# Patient Record
Sex: FEMALE | Race: WHITE | NOT HISPANIC OR LATINO | URBAN - METROPOLITAN AREA
[De-identification: names, ages, dates, MRNs, and addresses within clinical notes are randomized per-mention and may not be internally consistent; named-entity substitution may affect disease eponyms.]

---

## 2024-09-29 ENCOUNTER — HOSPITAL ENCOUNTER (INPATIENT)
Facility: HOSPITAL | Age: 74
End: 2024-09-29
Attending: STUDENT IN AN ORGANIZED HEALTH CARE EDUCATION/TRAINING PROGRAM | Admitting: INTERNAL MEDICINE
Payer: MEDICARE

## 2024-09-29 ENCOUNTER — DOCUMENTATION (OUTPATIENT)
Dept: INTERNAL MEDICINE | Facility: HOSPITAL | Age: 74
End: 2024-09-29
Payer: MEDICARE

## 2024-09-29 DIAGNOSIS — M79.89 SWELLING OF CALF: ICD-10-CM

## 2024-09-29 DIAGNOSIS — R01.1 SYSTOLIC MURMUR: Primary | ICD-10-CM

## 2024-09-29 DIAGNOSIS — K50.919 CROHN'S DISEASE WITH COMPLICATION, UNSPECIFIED GASTROINTESTINAL TRACT LOCATION: ICD-10-CM

## 2024-09-29 DIAGNOSIS — S72.002A LEFT DISPLACED FEMORAL NECK FRACTURE: ICD-10-CM

## 2024-09-29 DIAGNOSIS — Z90.710 HISTORY OF HYSTERECTOMY: ICD-10-CM

## 2024-09-29 DIAGNOSIS — C25.9 PANCREATIC CANCER (MULTI): ICD-10-CM

## 2024-09-29 PROCEDURE — 1170000001 HC PRIVATE ONCOLOGY ROOM DAILY

## 2024-09-29 PROCEDURE — 99223 1ST HOSP IP/OBS HIGH 75: CPT

## 2024-09-29 RX ORDER — POLYETHYLENE GLYCOL 3350 17 G/17G
17 POWDER, FOR SOLUTION ORAL DAILY
Status: DISPENSED | OUTPATIENT
Start: 2024-09-30

## 2024-09-29 SDOH — ECONOMIC STABILITY: FOOD INSECURITY: WITHIN THE PAST 12 MONTHS, THE FOOD YOU BOUGHT JUST DIDN'T LAST AND YOU DIDN'T HAVE MONEY TO GET MORE.: NEVER TRUE

## 2024-09-29 SDOH — HEALTH STABILITY: PHYSICAL HEALTH: ON AVERAGE, HOW MANY MINUTES DO YOU ENGAGE IN EXERCISE AT THIS LEVEL?: PATIENT DECLINED

## 2024-09-29 SDOH — SOCIAL STABILITY: SOCIAL INSECURITY: WITHIN THE LAST YEAR, HAVE YOU BEEN HUMILIATED OR EMOTIONALLY ABUSED IN OTHER WAYS BY YOUR PARTNER OR EX-PARTNER?: NO

## 2024-09-29 SDOH — SOCIAL STABILITY: SOCIAL INSECURITY: WITHIN THE LAST YEAR, HAVE YOU BEEN AFRAID OF YOUR PARTNER OR EX-PARTNER?: NO

## 2024-09-29 SDOH — HEALTH STABILITY: MENTAL HEALTH: HOW OFTEN DO YOU HAVE A DRINK CONTAINING ALCOHOL?: NEVER

## 2024-09-29 SDOH — SOCIAL STABILITY: SOCIAL INSECURITY: WERE YOU ABLE TO COMPLETE ALL THE BEHAVIORAL HEALTH SCREENINGS?: YES

## 2024-09-29 SDOH — SOCIAL STABILITY: SOCIAL INSECURITY
WITHIN THE LAST YEAR, HAVE YOU BEEN RAPED OR FORCED TO HAVE ANY KIND OF SEXUAL ACTIVITY BY YOUR PARTNER OR EX-PARTNER?: NO

## 2024-09-29 SDOH — SOCIAL STABILITY: SOCIAL INSECURITY
WITHIN THE LAST YEAR, HAVE YOU BEEN KICKED, HIT, SLAPPED, OR OTHERWISE PHYSICALLY HURT BY YOUR PARTNER OR EX-PARTNER?: NO

## 2024-09-29 SDOH — HEALTH STABILITY: PHYSICAL HEALTH
ON AVERAGE, HOW MANY DAYS PER WEEK DO YOU ENGAGE IN MODERATE TO STRENUOUS EXERCISE (LIKE A BRISK WALK)?: PATIENT DECLINED

## 2024-09-29 SDOH — HEALTH STABILITY: MENTAL HEALTH: HOW MANY STANDARD DRINKS CONTAINING ALCOHOL DO YOU HAVE ON A TYPICAL DAY?: PATIENT DOES NOT DRINK

## 2024-09-29 SDOH — SOCIAL STABILITY: SOCIAL INSECURITY
WITHIN THE LAST YEAR, HAVE TO BEEN RAPED OR FORCED TO HAVE ANY KIND OF SEXUAL ACTIVITY BY YOUR PARTNER OR EX-PARTNER?: NO

## 2024-09-29 SDOH — HEALTH STABILITY: MENTAL HEALTH: HOW OFTEN DO YOU HAVE 6 OR MORE DRINKS ON ONE OCCASION?: NEVER

## 2024-09-29 SDOH — ECONOMIC STABILITY: FOOD INSECURITY: WITHIN THE PAST 12 MONTHS, YOU WORRIED THAT YOUR FOOD WOULD RUN OUT BEFORE YOU GOT MONEY TO BUY MORE.: NEVER TRUE

## 2024-09-29 SDOH — ECONOMIC STABILITY: INCOME INSECURITY: IN THE PAST 12 MONTHS, HAS THE ELECTRIC, GAS, OIL, OR WATER COMPANY THREATENED TO SHUT OFF SERVICE IN YOUR HOME?: NO

## 2024-09-29 SDOH — HEALTH STABILITY: MENTAL HEALTH
HOW OFTEN DO YOU NEED TO HAVE SOMEONE HELP YOU WHEN YOU READ INSTRUCTIONS, PAMPHLETS, OR OTHER WRITTEN MATERIAL FROM YOUR DOCTOR OR PHARMACY?: NEVER

## 2024-09-29 SDOH — HEALTH STABILITY: MENTAL HEALTH: HOW OFTEN DO YOU HAVE SIX OR MORE DRINKS ON ONE OCCASION?: NEVER

## 2024-09-29 SDOH — ECONOMIC STABILITY: FOOD INSECURITY: WITHIN THE PAST 12 MONTHS, YOU WORRIED THAT YOUR FOOD WOULD RUN OUT BEFORE YOU GOT THE MONEY TO BUY MORE.: NEVER TRUE

## 2024-09-29 SDOH — SOCIAL STABILITY: SOCIAL INSECURITY: HAVE YOU HAD THOUGHTS OF HARMING ANYONE ELSE?: NO

## 2024-09-29 SDOH — ECONOMIC STABILITY: INCOME INSECURITY: IN THE PAST 12 MONTHS HAS THE ELECTRIC, GAS, OIL, OR WATER COMPANY THREATENED TO SHUT OFF SERVICES IN YOUR HOME?: NO

## 2024-09-29 SDOH — HEALTH STABILITY: MENTAL HEALTH: HOW MANY DRINKS CONTAINING ALCOHOL DO YOU HAVE ON A TYPICAL DAY WHEN YOU ARE DRINKING?: PATIENT DOES NOT DRINK

## 2024-09-29 ASSESSMENT — ACTIVITIES OF DAILY LIVING (ADL)
ADEQUATE_TO_COMPLETE_ADL: YES
HEARING - RIGHT EAR: FUNCTIONAL
WALKS IN HOME: NEEDS ASSISTANCE
JUDGMENT_ADEQUATE_SAFELY_COMPLETE_DAILY_ACTIVITIES: YES
FEEDING YOURSELF: NEEDS ASSISTANCE
GROOMING: NEEDS ASSISTANCE
PATIENT'S MEMORY ADEQUATE TO SAFELY COMPLETE DAILY ACTIVITIES?: YES
ASSISTIVE_DEVICE: WALKER;WHEELCHAIR
BATHING: NEEDS ASSISTANCE
LACK_OF_TRANSPORTATION: NO
DRESSING YOURSELF: NEEDS ASSISTANCE
TOILETING: NEEDS ASSISTANCE
HEARING - LEFT EAR: FUNCTIONAL

## 2024-09-29 ASSESSMENT — COGNITIVE AND FUNCTIONAL STATUS - GENERAL
TURNING FROM BACK TO SIDE WHILE IN FLAT BAD: A LOT
DRESSING REGULAR UPPER BODY CLOTHING: A LOT
DRESSING REGULAR LOWER BODY CLOTHING: A LOT
STANDING UP FROM CHAIR USING ARMS: A LOT
CLIMB 3 TO 5 STEPS WITH RAILING: TOTAL
TOILETING: A LOT
MOVING FROM LYING ON BACK TO SITTING ON SIDE OF FLAT BED WITH BEDRAILS: A LOT
PATIENT BASELINE BEDBOUND: NO
MOVING TO AND FROM BED TO CHAIR: A LOT
HELP NEEDED FOR BATHING: A LOT
DAILY ACTIVITIY SCORE: 16
WALKING IN HOSPITAL ROOM: TOTAL
MOBILITY SCORE: 10

## 2024-09-29 ASSESSMENT — COLUMBIA-SUICIDE SEVERITY RATING SCALE - C-SSRS
6. HAVE YOU EVER DONE ANYTHING, STARTED TO DO ANYTHING, OR PREPARED TO DO ANYTHING TO END YOUR LIFE?: NO
2. HAVE YOU ACTUALLY HAD ANY THOUGHTS OF KILLING YOURSELF?: NO
1. IN THE PAST MONTH, HAVE YOU WISHED YOU WERE DEAD OR WISHED YOU COULD GO TO SLEEP AND NOT WAKE UP?: NO

## 2024-09-29 ASSESSMENT — PATIENT HEALTH QUESTIONNAIRE - PHQ9
2. FEELING DOWN, DEPRESSED OR HOPELESS: NOT AT ALL
SUM OF ALL RESPONSES TO PHQ9 QUESTIONS 1 & 2: 0
1. LITTLE INTEREST OR PLEASURE IN DOING THINGS: NOT AT ALL

## 2024-09-29 ASSESSMENT — LIFESTYLE VARIABLES
HOW OFTEN DO YOU HAVE 6 OR MORE DRINKS ON ONE OCCASION: NEVER
HOW OFTEN DO YOU HAVE A DRINK CONTAINING ALCOHOL: NEVER
AUDIT-C TOTAL SCORE: 0
HOW MANY STANDARD DRINKS CONTAINING ALCOHOL DO YOU HAVE ON A TYPICAL DAY: PATIENT DOES NOT DRINK
SUBSTANCE_ABUSE_PAST_12_MONTHS: NO
AUDIT-C TOTAL SCORE: 0
SKIP TO QUESTIONS 9-10: 1
PRESCIPTION_ABUSE_PAST_12_MONTHS: NO
AUDIT-C TOTAL SCORE: 0
SKIP TO QUESTIONS 9-10: 1

## 2024-09-30 ENCOUNTER — APPOINTMENT (OUTPATIENT)
Dept: RADIOLOGY | Facility: HOSPITAL | Age: 74
DRG: 521 | End: 2024-09-30
Payer: MEDICARE

## 2024-09-30 ENCOUNTER — APPOINTMENT (OUTPATIENT)
Dept: CARDIOLOGY | Facility: HOSPITAL | Age: 74
DRG: 521 | End: 2024-09-30
Payer: MEDICARE

## 2024-09-30 ENCOUNTER — OFFICE VISIT (OUTPATIENT)
Dept: SURGICAL ONCOLOGY | Facility: HOSPITAL | Age: 74
DRG: 521 | End: 2024-09-30
Payer: MEDICARE

## 2024-09-30 ENCOUNTER — APPOINTMENT (OUTPATIENT)
Dept: CARDIOLOGY | Facility: HOSPITAL | Age: 74
End: 2024-09-30
Payer: MEDICARE

## 2024-09-30 ENCOUNTER — ANESTHESIA EVENT (OUTPATIENT)
Dept: OPERATING ROOM | Facility: HOSPITAL | Age: 74
End: 2024-09-30
Payer: MEDICARE

## 2024-09-30 PROBLEM — Z90.710 HISTORY OF HYSTERECTOMY: Status: ACTIVE | Noted: 2024-09-30

## 2024-09-30 PROBLEM — K50.90 CROHN'S DISEASE (MULTI): Status: ACTIVE | Noted: 2024-09-30

## 2024-09-30 PROBLEM — S72.002A LEFT DISPLACED FEMORAL NECK FRACTURE: Status: ACTIVE | Noted: 2024-09-29

## 2024-09-30 LAB
ABO GROUP (TYPE) IN BLOOD: NORMAL
ABO GROUP (TYPE) IN BLOOD: NORMAL
ALBUMIN SERPL BCP-MCNC: 2.8 G/DL (ref 3.4–5)
ALBUMIN SERPL BCP-MCNC: 2.8 G/DL (ref 3.4–5)
ALP SERPL-CCNC: 175 U/L (ref 33–136)
ALP SERPL-CCNC: 175 U/L (ref 33–136)
ALT SERPL W P-5'-P-CCNC: 18 U/L (ref 7–45)
ALT SERPL W P-5'-P-CCNC: 18 U/L (ref 7–45)
ANION GAP SERPL CALC-SCNC: 12 MMOL/L (ref 10–20)
ANION GAP SERPL CALC-SCNC: 12 MMOL/L (ref 10–20)
ANTIBODY SCREEN: NORMAL
ANTIBODY SCREEN: NORMAL
AORTIC VALVE MEAN GRADIENT: 9 MMHG
AORTIC VALVE MEAN GRADIENT: 9 MMHG
AORTIC VALVE PEAK VELOCITY: 2.11 M/S
AORTIC VALVE PEAK VELOCITY: 2.11 M/S
APPEARANCE UR: ABNORMAL
APPEARANCE UR: ABNORMAL
APTT PPP: 30 SECONDS (ref 27–38)
APTT PPP: 30 SECONDS (ref 27–38)
AST SERPL W P-5'-P-CCNC: 15 U/L (ref 9–39)
AST SERPL W P-5'-P-CCNC: 15 U/L (ref 9–39)
AV PEAK GRADIENT: 17.8 MMHG
AV PEAK GRADIENT: 17.8 MMHG
AVA (PEAK VEL): 2.77 CM2
AVA (PEAK VEL): 2.77 CM2
AVA (VTI): 2.36 CM2
AVA (VTI): 2.36 CM2
BASOPHILS # BLD AUTO: 0.01 X10*3/UL (ref 0–0.1)
BASOPHILS # BLD AUTO: 0.01 X10*3/UL (ref 0–0.1)
BASOPHILS NFR BLD AUTO: 0.1 %
BASOPHILS NFR BLD AUTO: 0.1 %
BILIRUB DIRECT SERPL-MCNC: 0.1 MG/DL (ref 0–0.3)
BILIRUB DIRECT SERPL-MCNC: 0.1 MG/DL (ref 0–0.3)
BILIRUB SERPL-MCNC: 0.4 MG/DL (ref 0–1.2)
BILIRUB SERPL-MCNC: 0.4 MG/DL (ref 0–1.2)
BILIRUB UR STRIP.AUTO-MCNC: NEGATIVE MG/DL
BILIRUB UR STRIP.AUTO-MCNC: NEGATIVE MG/DL
BUN SERPL-MCNC: 43 MG/DL (ref 6–23)
BUN SERPL-MCNC: 43 MG/DL (ref 6–23)
CALCIUM SERPL-MCNC: 8.9 MG/DL (ref 8.6–10.6)
CALCIUM SERPL-MCNC: 8.9 MG/DL (ref 8.6–10.6)
CHLORIDE SERPL-SCNC: 98 MMOL/L (ref 98–107)
CHLORIDE SERPL-SCNC: 98 MMOL/L (ref 98–107)
CO2 SERPL-SCNC: 28 MMOL/L (ref 21–32)
CO2 SERPL-SCNC: 28 MMOL/L (ref 21–32)
COLOR UR: YELLOW
COLOR UR: YELLOW
CREAT SERPL-MCNC: 1.21 MG/DL (ref 0.5–1.05)
CREAT SERPL-MCNC: 1.21 MG/DL (ref 0.5–1.05)
EGFRCR SERPLBLD CKD-EPI 2021: 47 ML/MIN/1.73M*2
EGFRCR SERPLBLD CKD-EPI 2021: 47 ML/MIN/1.73M*2
EJECTION FRACTION APICAL 4 CHAMBER: 71.4
EJECTION FRACTION APICAL 4 CHAMBER: 71.4
EJECTION FRACTION: 68 %
EJECTION FRACTION: 68 %
EOSINOPHIL # BLD AUTO: 0.03 X10*3/UL (ref 0–0.4)
EOSINOPHIL # BLD AUTO: 0.03 X10*3/UL (ref 0–0.4)
EOSINOPHIL NFR BLD AUTO: 0.2 %
EOSINOPHIL NFR BLD AUTO: 0.2 %
ERYTHROCYTE [DISTWIDTH] IN BLOOD BY AUTOMATED COUNT: 14.6 % (ref 11.5–14.5)
ERYTHROCYTE [DISTWIDTH] IN BLOOD BY AUTOMATED COUNT: 14.6 % (ref 11.5–14.5)
GLUCOSE SERPL-MCNC: 112 MG/DL (ref 74–99)
GLUCOSE SERPL-MCNC: 112 MG/DL (ref 74–99)
GLUCOSE UR STRIP.AUTO-MCNC: NORMAL MG/DL
GLUCOSE UR STRIP.AUTO-MCNC: NORMAL MG/DL
HCT VFR BLD AUTO: 30.5 % (ref 36–46)
HCT VFR BLD AUTO: 30.5 % (ref 36–46)
HGB BLD-MCNC: 9.5 G/DL (ref 12–16)
HGB BLD-MCNC: 9.5 G/DL (ref 12–16)
IMM GRANULOCYTES # BLD AUTO: 0.15 X10*3/UL (ref 0–0.5)
IMM GRANULOCYTES # BLD AUTO: 0.15 X10*3/UL (ref 0–0.5)
IMM GRANULOCYTES NFR BLD AUTO: 1 % (ref 0–0.9)
IMM GRANULOCYTES NFR BLD AUTO: 1 % (ref 0–0.9)
INR PPP: 1.1 (ref 0.9–1.1)
INR PPP: 1.1 (ref 0.9–1.1)
KETONES UR STRIP.AUTO-MCNC: NEGATIVE MG/DL
KETONES UR STRIP.AUTO-MCNC: NEGATIVE MG/DL
LEFT ATRIUM VOLUME AREA LENGTH INDEX BSA: 13.4 ML/M2
LEFT ATRIUM VOLUME AREA LENGTH INDEX BSA: 13.4 ML/M2
LEFT VENTRICLE INTERNAL DIMENSION DIASTOLE: 2.5 CM (ref 3.5–6)
LEFT VENTRICLE INTERNAL DIMENSION DIASTOLE: 2.5 CM (ref 3.5–6)
LEFT VENTRICULAR OUTFLOW TRACT DIAMETER: 1.9 CM
LEFT VENTRICULAR OUTFLOW TRACT DIAMETER: 1.9 CM
LEUKOCYTE ESTERASE UR QL STRIP.AUTO: ABNORMAL
LEUKOCYTE ESTERASE UR QL STRIP.AUTO: ABNORMAL
LYMPHOCYTES # BLD AUTO: 1.53 X10*3/UL (ref 0.8–3)
LYMPHOCYTES # BLD AUTO: 1.53 X10*3/UL (ref 0.8–3)
LYMPHOCYTES NFR BLD AUTO: 10.2 %
LYMPHOCYTES NFR BLD AUTO: 10.2 %
MAGNESIUM SERPL-MCNC: 2.07 MG/DL (ref 1.6–2.4)
MAGNESIUM SERPL-MCNC: 2.07 MG/DL (ref 1.6–2.4)
MCH RBC QN AUTO: 31.1 PG (ref 26–34)
MCH RBC QN AUTO: 31.1 PG (ref 26–34)
MCHC RBC AUTO-ENTMCNC: 31.1 G/DL (ref 32–36)
MCHC RBC AUTO-ENTMCNC: 31.1 G/DL (ref 32–36)
MCV RBC AUTO: 100 FL (ref 80–100)
MCV RBC AUTO: 100 FL (ref 80–100)
MITRAL VALVE E/A RATIO: 0.41
MITRAL VALVE E/A RATIO: 0.41
MONOCYTES # BLD AUTO: 1.63 X10*3/UL (ref 0.05–0.8)
MONOCYTES # BLD AUTO: 1.63 X10*3/UL (ref 0.05–0.8)
MONOCYTES NFR BLD AUTO: 10.9 %
MONOCYTES NFR BLD AUTO: 10.9 %
MUCOUS THREADS #/AREA URNS AUTO: ABNORMAL /LPF
MUCOUS THREADS #/AREA URNS AUTO: ABNORMAL /LPF
NEUTROPHILS # BLD AUTO: 11.58 X10*3/UL (ref 1.6–5.5)
NEUTROPHILS # BLD AUTO: 11.58 X10*3/UL (ref 1.6–5.5)
NEUTROPHILS NFR BLD AUTO: 77.6 %
NEUTROPHILS NFR BLD AUTO: 77.6 %
NITRITE UR QL STRIP.AUTO: NEGATIVE
NITRITE UR QL STRIP.AUTO: NEGATIVE
NRBC BLD-RTO: 0 /100 WBCS (ref 0–0)
NRBC BLD-RTO: 0 /100 WBCS (ref 0–0)
PH UR STRIP.AUTO: 5.5 [PH]
PH UR STRIP.AUTO: 5.5 [PH]
PHOSPHATE SERPL-MCNC: 4.8 MG/DL (ref 2.5–4.9)
PHOSPHATE SERPL-MCNC: 4.8 MG/DL (ref 2.5–4.9)
PLATELET # BLD AUTO: 340 X10*3/UL (ref 150–450)
PLATELET # BLD AUTO: 340 X10*3/UL (ref 150–450)
POTASSIUM SERPL-SCNC: 4.7 MMOL/L (ref 3.5–5.3)
POTASSIUM SERPL-SCNC: 4.7 MMOL/L (ref 3.5–5.3)
PROT SERPL-MCNC: 6.1 G/DL (ref 6.4–8.2)
PROT SERPL-MCNC: 6.1 G/DL (ref 6.4–8.2)
PROT UR STRIP.AUTO-MCNC: ABNORMAL MG/DL
PROT UR STRIP.AUTO-MCNC: ABNORMAL MG/DL
PROTHROMBIN TIME: 12.1 SECONDS (ref 9.8–12.8)
PROTHROMBIN TIME: 12.1 SECONDS (ref 9.8–12.8)
RBC # BLD AUTO: 3.05 X10*6/UL (ref 4–5.2)
RBC # BLD AUTO: 3.05 X10*6/UL (ref 4–5.2)
RBC # UR STRIP.AUTO: NEGATIVE /UL
RBC # UR STRIP.AUTO: NEGATIVE /UL
RBC #/AREA URNS AUTO: ABNORMAL /HPF
RBC #/AREA URNS AUTO: ABNORMAL /HPF
RH FACTOR (ANTIGEN D): NORMAL
RH FACTOR (ANTIGEN D): NORMAL
SODIUM SERPL-SCNC: 133 MMOL/L (ref 136–145)
SODIUM SERPL-SCNC: 133 MMOL/L (ref 136–145)
SP GR UR STRIP.AUTO: 1.02
SP GR UR STRIP.AUTO: 1.02
SQUAMOUS #/AREA URNS AUTO: ABNORMAL /HPF
SQUAMOUS #/AREA URNS AUTO: ABNORMAL /HPF
TRICUSPID ANNULAR PLANE SYSTOLIC EXCURSION: 2 CM
TRICUSPID ANNULAR PLANE SYSTOLIC EXCURSION: 2 CM
UROBILINOGEN UR STRIP.AUTO-MCNC: NORMAL MG/DL
UROBILINOGEN UR STRIP.AUTO-MCNC: NORMAL MG/DL
WBC # BLD AUTO: 14.9 X10*3/UL (ref 4.4–11.3)
WBC # BLD AUTO: 14.9 X10*3/UL (ref 4.4–11.3)
WBC #/AREA URNS AUTO: ABNORMAL /HPF
WBC #/AREA URNS AUTO: ABNORMAL /HPF

## 2024-09-30 PROCEDURE — 85610 PROTHROMBIN TIME: CPT

## 2024-09-30 PROCEDURE — 85025 COMPLETE CBC W/AUTO DIFF WBC: CPT

## 2024-09-30 PROCEDURE — 2500000001 HC RX 250 WO HCPCS SELF ADMINISTERED DRUGS (ALT 637 FOR MEDICARE OP)

## 2024-09-30 PROCEDURE — 99221 1ST HOSP IP/OBS SF/LOW 40: CPT

## 2024-09-30 PROCEDURE — 83735 ASSAY OF MAGNESIUM: CPT

## 2024-09-30 PROCEDURE — 99233 SBSQ HOSP IP/OBS HIGH 50: CPT

## 2024-09-30 PROCEDURE — 93010 ELECTROCARDIOGRAM REPORT: CPT | Performed by: INTERNAL MEDICINE

## 2024-09-30 PROCEDURE — 1170000001 HC PRIVATE ONCOLOGY ROOM DAILY

## 2024-09-30 PROCEDURE — 2500000004 HC RX 250 GENERAL PHARMACY W/ HCPCS (ALT 636 FOR OP/ED)

## 2024-09-30 PROCEDURE — 71045 X-RAY EXAM CHEST 1 VIEW: CPT

## 2024-09-30 PROCEDURE — 93306 TTE W/DOPPLER COMPLETE: CPT | Performed by: STUDENT IN AN ORGANIZED HEALTH CARE EDUCATION/TRAINING PROGRAM

## 2024-09-30 PROCEDURE — 93005 ELECTROCARDIOGRAM TRACING: CPT

## 2024-09-30 PROCEDURE — 93306 TTE W/DOPPLER COMPLETE: CPT

## 2024-09-30 PROCEDURE — 73502 X-RAY EXAM HIP UNI 2-3 VIEWS: CPT | Mod: LT

## 2024-09-30 PROCEDURE — 82248 BILIRUBIN DIRECT: CPT

## 2024-09-30 PROCEDURE — 80053 COMPREHEN METABOLIC PANEL: CPT

## 2024-09-30 PROCEDURE — 73552 X-RAY EXAM OF FEMUR 2/>: CPT | Mod: LT

## 2024-09-30 PROCEDURE — 81001 URINALYSIS AUTO W/SCOPE: CPT

## 2024-09-30 PROCEDURE — 80048 BASIC METABOLIC PNL TOTAL CA: CPT

## 2024-09-30 PROCEDURE — 86901 BLOOD TYPING SEROLOGIC RH(D): CPT

## 2024-09-30 PROCEDURE — 86923 COMPATIBILITY TEST ELECTRIC: CPT

## 2024-09-30 PROCEDURE — 87086 URINE CULTURE/COLONY COUNT: CPT

## 2024-09-30 PROCEDURE — 86850 RBC ANTIBODY SCREEN: CPT

## 2024-09-30 PROCEDURE — 84100 ASSAY OF PHOSPHORUS: CPT

## 2024-09-30 RX ORDER — PANCRELIPASE 60000; 12000; 38000 [USP'U]/1; [USP'U]/1; [USP'U]/1
2 CAPSULE, DELAYED RELEASE PELLETS ORAL
Status: ON HOLD | COMMUNITY

## 2024-09-30 RX ORDER — LANOLIN ALCOHOL/MO/W.PET/CERES
1000 CREAM (GRAM) TOPICAL DAILY
Status: ON HOLD | COMMUNITY

## 2024-09-30 RX ORDER — LANOLIN ALCOHOL/MO/W.PET/CERES
1000 CREAM (GRAM) TOPICAL DAILY
Status: DISPENSED | OUTPATIENT
Start: 2024-09-30

## 2024-09-30 RX ORDER — OXYCODONE HYDROCHLORIDE 5 MG/1
5 TABLET ORAL EVERY 6 HOURS PRN
Status: DISCONTINUED | OUTPATIENT
Start: 2024-09-30 | End: 2024-09-30

## 2024-09-30 RX ORDER — ENOXAPARIN SODIUM 100 MG/ML
40 INJECTION SUBCUTANEOUS DAILY
Status: DISCONTINUED | OUTPATIENT
Start: 2024-09-30 | End: 2024-10-03

## 2024-09-30 RX ORDER — DEXAMETHASONE 4 MG/1
4 TABLET ORAL DAILY
Status: ON HOLD | COMMUNITY
Start: 2024-09-20 | End: 2024-10-04

## 2024-09-30 RX ORDER — PANTOPRAZOLE SODIUM 40 MG/1
40 TABLET, DELAYED RELEASE ORAL
Status: DISPENSED | OUTPATIENT
Start: 2024-09-30

## 2024-09-30 RX ORDER — ERGOCALCIFEROL 1.25 MG/1
1.25 CAPSULE ORAL WEEKLY
Status: ON HOLD | COMMUNITY

## 2024-09-30 RX ORDER — OMEPRAZOLE 40 MG/1
40 CAPSULE, DELAYED RELEASE ORAL
Status: ON HOLD | COMMUNITY

## 2024-09-30 RX ORDER — OXYCODONE HYDROCHLORIDE 5 MG/1
10 TABLET ORAL EVERY 4 HOURS PRN
Status: DISPENSED | OUTPATIENT
Start: 2024-09-30

## 2024-09-30 RX ORDER — DULOXETIN HYDROCHLORIDE 30 MG/1
30 CAPSULE, DELAYED RELEASE ORAL DAILY
Status: DISPENSED | OUTPATIENT
Start: 2024-09-30

## 2024-09-30 RX ORDER — GABAPENTIN 300 MG/1
300 CAPSULE ORAL 3 TIMES DAILY
Status: ON HOLD | COMMUNITY

## 2024-09-30 RX ORDER — SODIUM CHLORIDE, SODIUM LACTATE, POTASSIUM CHLORIDE, CALCIUM CHLORIDE 600; 310; 30; 20 MG/100ML; MG/100ML; MG/100ML; MG/100ML
75 INJECTION, SOLUTION INTRAVENOUS CONTINUOUS
Status: DISCONTINUED | OUTPATIENT
Start: 2024-09-30 | End: 2024-10-01

## 2024-09-30 RX ORDER — OXYCODONE HYDROCHLORIDE 10 MG/1
10 TABLET ORAL EVERY 4 HOURS PRN
Status: ON HOLD | COMMUNITY

## 2024-09-30 RX ORDER — ERGOCALCIFEROL 1.25 MG/1
1250 CAPSULE ORAL WEEKLY
Status: DISPENSED | OUTPATIENT
Start: 2024-09-30

## 2024-09-30 RX ORDER — GABAPENTIN 300 MG/1
300 CAPSULE ORAL DAILY
Status: DISCONTINUED | OUTPATIENT
Start: 2024-09-30 | End: 2024-09-30

## 2024-09-30 RX ORDER — HYDROMORPHONE HYDROCHLORIDE 0.2 MG/ML
0.2 INJECTION INTRAMUSCULAR; INTRAVENOUS; SUBCUTANEOUS
Status: DISCONTINUED | OUTPATIENT
Start: 2024-09-30 | End: 2024-09-30

## 2024-09-30 RX ORDER — DULOXETIN HYDROCHLORIDE 30 MG/1
30 CAPSULE, DELAYED RELEASE ORAL DAILY
Status: ON HOLD | COMMUNITY

## 2024-09-30 RX ORDER — GABAPENTIN 300 MG/1
300 CAPSULE ORAL 3 TIMES DAILY
Status: DISPENSED | OUTPATIENT
Start: 2024-09-30

## 2024-09-30 RX ORDER — ACETAMINOPHEN 325 MG/1
975 TABLET ORAL EVERY 6 HOURS PRN
Status: DISPENSED | OUTPATIENT
Start: 2024-09-30

## 2024-09-30 RX ADMIN — PANCRELIPASE 2 CAPSULE: 120000; 24000; 76000 CAPSULE, DELAYED RELEASE PELLETS ORAL at 10:39

## 2024-09-30 RX ADMIN — GABAPENTIN 300 MG: 300 CAPSULE ORAL at 15:31

## 2024-09-30 RX ADMIN — GABAPENTIN 300 MG: 300 CAPSULE ORAL at 21:24

## 2024-09-30 RX ADMIN — OXYCODONE HYDROCHLORIDE 10 MG: 5 TABLET ORAL at 15:31

## 2024-09-30 RX ADMIN — PANTOPRAZOLE SODIUM 40 MG: 40 TABLET, DELAYED RELEASE ORAL at 10:40

## 2024-09-30 RX ADMIN — PANCRELIPASE 2 CAPSULE: 120000; 24000; 76000 CAPSULE, DELAYED RELEASE PELLETS ORAL at 16:39

## 2024-09-30 RX ADMIN — CYANOCOBALAMIN TAB 1000 MCG 1000 MCG: 1000 TAB at 10:39

## 2024-09-30 RX ADMIN — OXYCODONE HYDROCHLORIDE 10 MG: 5 TABLET ORAL at 10:40

## 2024-09-30 RX ADMIN — PERFLUTREN 1 ML OF DILUTION: 6.52 INJECTION, SUSPENSION INTRAVENOUS at 14:37

## 2024-09-30 RX ADMIN — SODIUM CHLORIDE, POTASSIUM CHLORIDE, SODIUM LACTATE AND CALCIUM CHLORIDE 1000 ML: 600; 310; 30; 20 INJECTION, SOLUTION INTRAVENOUS at 18:23

## 2024-09-30 RX ADMIN — POLYETHYLENE GLYCOL 3350 17 G: 17 POWDER, FOR SOLUTION ORAL at 10:38

## 2024-09-30 RX ADMIN — ERGOCALCIFEROL 1250 MCG: 1.25 CAPSULE ORAL at 10:40

## 2024-09-30 RX ADMIN — DULOXETINE HYDROCHLORIDE 30 MG: 30 CAPSULE, DELAYED RELEASE ORAL at 10:39

## 2024-09-30 RX ADMIN — GABAPENTIN 300 MG: 300 CAPSULE ORAL at 10:39

## 2024-09-30 RX ADMIN — SODIUM CHLORIDE, POTASSIUM CHLORIDE, SODIUM LACTATE AND CALCIUM CHLORIDE 75 ML/HR: 600; 310; 30; 20 INJECTION, SOLUTION INTRAVENOUS at 18:23

## 2024-09-30 RX ADMIN — ENOXAPARIN SODIUM 40 MG: 100 INJECTION SUBCUTANEOUS at 16:39

## 2024-09-30 SDOH — SOCIAL STABILITY: SOCIAL INSECURITY: DO YOU FEEL ANYONE HAS EXPLOITED OR TAKEN ADVANTAGE OF YOU FINANCIALLY OR OF YOUR PERSONAL PROPERTY?: NO

## 2024-09-30 SDOH — SOCIAL STABILITY: SOCIAL INSECURITY: DO YOU FEEL UNSAFE GOING BACK TO THE PLACE WHERE YOU ARE LIVING?: NO

## 2024-09-30 SDOH — SOCIAL STABILITY: SOCIAL INSECURITY: HAS ANYONE EVER THREATENED TO HURT YOUR FAMILY OR YOUR PETS?: NO

## 2024-09-30 SDOH — SOCIAL STABILITY: SOCIAL INSECURITY: DOES ANYONE TRY TO KEEP YOU FROM HAVING/CONTACTING OTHER FRIENDS OR DOING THINGS OUTSIDE YOUR HOME?: NO

## 2024-09-30 SDOH — SOCIAL STABILITY: SOCIAL INSECURITY: WERE YOU ABLE TO COMPLETE ALL THE BEHAVIORAL HEALTH SCREENINGS?: YES

## 2024-09-30 SDOH — SOCIAL STABILITY: SOCIAL INSECURITY: HAVE YOU HAD THOUGHTS OF HARMING ANYONE ELSE?: NO

## 2024-09-30 SDOH — SOCIAL STABILITY: SOCIAL INSECURITY: ARE YOU OR HAVE YOU BEEN THREATENED OR ABUSED PHYSICALLY, EMOTIONALLY, OR SEXUALLY BY ANYONE?: NO

## 2024-09-30 SDOH — SOCIAL STABILITY: SOCIAL INSECURITY: ABUSE: ADULT

## 2024-09-30 SDOH — SOCIAL STABILITY: SOCIAL INSECURITY: ARE THERE ANY APPARENT SIGNS OF INJURIES/BEHAVIORS THAT COULD BE RELATED TO ABUSE/NEGLECT?: NO

## 2024-09-30 SDOH — SOCIAL STABILITY: SOCIAL INSECURITY: HAVE YOU HAD ANY THOUGHTS OF HARMING ANYONE ELSE?: NO

## 2024-09-30 ASSESSMENT — LIFESTYLE VARIABLES
SKIP TO QUESTIONS 9-10: 1
AUDIT-C TOTAL SCORE: 0
HOW MANY STANDARD DRINKS CONTAINING ALCOHOL DO YOU HAVE ON A TYPICAL DAY: PATIENT DOES NOT DRINK
HOW OFTEN DO YOU HAVE A DRINK CONTAINING ALCOHOL: NEVER
HOW OFTEN DO YOU HAVE 6 OR MORE DRINKS ON ONE OCCASION: NEVER
AUDIT-C TOTAL SCORE: 0

## 2024-09-30 ASSESSMENT — COGNITIVE AND FUNCTIONAL STATUS - GENERAL
DAILY ACTIVITIY SCORE: 16
WALKING IN HOSPITAL ROOM: A LOT
DRESSING REGULAR LOWER BODY CLOTHING: A LOT
DRESSING REGULAR LOWER BODY CLOTHING: A LOT
TURNING FROM BACK TO SIDE WHILE IN FLAT BAD: A LITTLE
MOBILITY SCORE: 13
CLIMB 3 TO 5 STEPS WITH RAILING: TOTAL
MOVING FROM LYING ON BACK TO SITTING ON SIDE OF FLAT BED WITH BEDRAILS: A LITTLE
HELP NEEDED FOR BATHING: A LOT
STANDING UP FROM CHAIR USING ARMS: A LOT
MOVING TO AND FROM BED TO CHAIR: A LOT
MOBILITY SCORE: 13
STANDING UP FROM CHAIR USING ARMS: A LOT
DAILY ACTIVITIY SCORE: 16
DRESSING REGULAR UPPER BODY CLOTHING: A LOT
DRESSING REGULAR UPPER BODY CLOTHING: A LOT
CLIMB 3 TO 5 STEPS WITH RAILING: TOTAL
TOILETING: A LOT
TURNING FROM BACK TO SIDE WHILE IN FLAT BAD: A LITTLE
TOILETING: A LOT
WALKING IN HOSPITAL ROOM: A LOT
MOVING FROM LYING ON BACK TO SITTING ON SIDE OF FLAT BED WITH BEDRAILS: A LITTLE
HELP NEEDED FOR BATHING: A LOT
MOVING TO AND FROM BED TO CHAIR: A LOT

## 2024-09-30 ASSESSMENT — PAIN SCALES - GENERAL
PAINLEVEL_OUTOF10: 2
PAINLEVEL_OUTOF10: 0 - NO PAIN
PAINLEVEL_OUTOF10: 4
PAINLEVEL_OUTOF10: 0 - NO PAIN
PAINLEVEL_OUTOF10: 7
PAINLEVEL_OUTOF10: 7

## 2024-09-30 ASSESSMENT — ACTIVITIES OF DAILY LIVING (ADL): LACK_OF_TRANSPORTATION: NO

## 2024-09-30 ASSESSMENT — PAIN DESCRIPTION - ORIENTATION: ORIENTATION: LEFT

## 2024-09-30 ASSESSMENT — PAIN - FUNCTIONAL ASSESSMENT: PAIN_FUNCTIONAL_ASSESSMENT: 0-10

## 2024-09-30 ASSESSMENT — PAIN DESCRIPTION - LOCATION
LOCATION: LEG
LOCATION: LEG

## 2024-09-30 NOTE — CARE PLAN
Problem: Fall/Injury  Goal: Not fall by end of shift  Outcome: Progressing  Goal: Be free from injury by end of the shift  Outcome: Progressing  Goal: Verbalize understanding of personal risk factors for fall in the hospital  Outcome: Progressing  Goal: Verbalize understanding of risk factor reduction measures to prevent injury from fall in the home  Outcome: Progressing  Goal: Use assistive devices by end of the shift  Outcome: Progressing  Goal: Pace activities to prevent fatigue by end of the shift  Outcome: Progressing     Problem: Pain - Adult  Goal: Verbalizes/displays adequate comfort level or baseline comfort level  Outcome: Progressing     Problem: Safety - Adult  Goal: Free from fall injury  Outcome: Progressing     Problem: Discharge Planning  Goal: Discharge to home or other facility with appropriate resources  Outcome: Progressing     Problem: Chronic Conditions and Co-morbidities  Goal: Patient's chronic conditions and co-morbidity symptoms are monitored and maintained or improved  Outcome: Progressing      The clinical goals for the shift include Patient will remain HDS and free from falls.

## 2024-09-30 NOTE — ANESTHESIA PREPROCEDURE EVALUATION
Patient: Allyson Armendariz    Procedure Information       Date/Time: 10/01/24 1110    Procedures:       Hip Hemiarthroplasty (Left: Hip)      Arthroplasty Total Hip (Left: Hip)    Location: [facility] OR 09 / Virtual [facility] OR    Surgeons: Beau Salazar MD             Relevant Problems   GI   (+) Crohn's disease (Multi)      Liver   (+) Pancreatic cancer (Multi)      GYN   (+) History of hysterectomy     ALLERGIES:  No Known Allergies       SURGICAL HISTORY:  History reviewed. No pertinent surgical history.     MEDICATIONS:  Current Outpatient Medications   Medication Instructions    cyanocobalamin (VITAMIN B-12) 1,000 mcg, oral, Daily    dexAMETHasone (DECADRON) 4 mg, oral, Daily, For 14 days    DULoxetine (CYMBALTA) 30 mg, oral, Daily    ergocalciferol (VITAMIN D-2) 1.25 mg, oral, Weekly    gabapentin (NEURONTIN) 300 mg, oral, 3 times daily    omeprazole (PRILOSEC) 40 mg, oral, Daily before breakfast, Do not crush or chew.    oxyCODONE (ROXICODONE) 10 mg, oral, Every 4 hours PRN    pancrelipase, Lip-Prot-Amyl, (Creon) 12,000-38,000 -60,000 unit capsule 2 capsules, oral, 3 times daily before meals        VITALS:      9/30/2024    12:16 PM 9/30/2024     9:21 AM 9/30/2024     3:00 AM   Vitals   Systolic 145 116 122   Diastolic 84 77 72   Heart Rate 100 95 88   Temp 35.5 °C (95.9 °F) 36.1 °C (97 °F) 36.4 °C (97.5 °F)   Resp 18 18 18   Visit Report Report Report Report       LABS:   BMP   Lab Results   Component Value Date    GLUCOSE 112 (H) 09/30/2024    CALCIUM 8.9 09/30/2024     (L) 09/30/2024    K 4.7 09/30/2024    CO2 28 09/30/2024    CL 98 09/30/2024    BUN 43 (H) 09/30/2024    CREATININE 1.21 (H) 09/30/2024   , LFT   Lab Results   Component Value Date    ALT 18 09/30/2024    AST 15 09/30/2024    ALKPHOS 175 (H) 09/30/2024    BILITOT 0.4 09/30/2024     SOCIAL:  Social History     Tobacco Use   Smoking Status Former    Types: Cigarettes   Smokeless Tobacco Former      Social History     Substance and  Sexual Activity   Alcohol Use Never      Social History     Substance and Sexual Activity   Drug Use Never        NPO STATUS:  NPO/Void Status  Carbohydrate Drink Given Prior to Surgery? : N  Date of Last Liquid: 09/29/24  Time of Last Liquid: 2200        Clinical Areas Reviewed:   Tobacco  Allergies  Meds   Med Hx  Surg Hx   Fam Hx          Anesthesia Assessment:    PHYSICAL EXAM    Anesthesia Plan

## 2024-09-30 NOTE — PROGRESS NOTES
Occupational Therapy                 Therapy Communication Note    Patient Name: Allyson Armendariz  MRN: [id number]  Department: [facility]  Room: 6011/6011-A  Today's Date: 9/30/2024     Discipline: Occupational Therapy    Missed Visit Reason: Missed Visit Reason:  (Consented and posted to OR schedule for L hip bekah vs total arthroplasty with biopsy w/ Dr. Salazar on 9/30)    Missed Time: Attempt    Comment:

## 2024-09-30 NOTE — PROGRESS NOTES
Pharmacy Medication History Review    Allyson Armendariz is a 73 y.o. female admitted for Pancreatic cancer (Multi). Pharmacy reviewed the patient's [id number] medications and allergies for accuracy.    Medications ADDED:  All   Medications CHANGED:  None   Medications REMOVED:   None      The list below reflects the updated PTA list.   Prior to Admission Medications   Prescriptions Last Dose Informant   DULoxetine (Cymbalta) 30 mg DR capsule 9/29/2024 at 0831 Self   Sig: Take 1 capsule (30 mg) by mouth once daily.   cyanocobalamin (Vitamin B-12) 1,000 mcg tablet Past Week Self   Sig: Take 1 tablet (1,000 mcg) by mouth once daily.   dexAMETHasone (Decadron) 4 mg tablet 9/29/2024 at 0831 Self   Sig: Take 1 tablet (4 mg) by mouth once daily. For 14 days  9/20/2024 - 10/04/2024   ergocalciferol (Vitamin D-2) 1.25 MG (73669 UT) capsule Past Month Self   Sig: Take 1 capsule (1,250 mcg) by mouth 1 (one) time per week.  Patient taking differently: every 1 month   gabapentin (Neurontin) 300 mg capsule 9/29/2024 at 1324 Self   Sig: Take 1 capsule (300 mg) by mouth 3 times a day.   omeprazole (PriLOSEC) 40 mg DR capsule 9/29/2024 at 1831 Self   Sig: Take 1 capsule (40 mg) by mouth once daily in the morning.    oxyCODONE (Roxicodone) 10 mg immediate release tablet 9/29/2024 at 0552 Self   Sig: Take 1 tablet (10 mg) by mouth every 4 hours if needed for severe pain (7 - 10).   pancrelipase, Lip-Prot-Amyl, (Creon) 12,000-38,000 -60,000 unit capsule 9/29/2024 at 1706 Self   Sig: Take 2 capsules by mouth 3 times a day before meals.      Facility-Administered Medications: None        The list below reflects the updated allergy list. Please review each documented allergy for additional clarification and justification.  Allergies  Reviewed by Franklin Kuhn RN on 9/29/2024   No Known Allergies         Patient accepts M2B at discharge.   Local pharmacy: [facility]     Sources:   Pt interview - good historian. Pt  "reviewed [facility] medication list on her phone to assist with interview. Able to answer specific questions regarding medications.   Paper chart from [facility] transfer (discharge medication list, inpatient MAR)   OARRS (Allyson Armendariz [birth year] [address])  Dispense history - unavailable. Pt reports using only [facility] pharmacy.     Additional Comments:  Potassium chloride 20 mEq ER daily --> has not taken for a few months. Per patient - not needed, potassium levels okay. (No Care everywhere connected at time of documentation to review [facility] outpatient labs)   Omeprazole 40 mg daily -->PPI for reported GERD.   Allergy: NKDA, avoids NSAID due to renal effects.   Albuterol HFA - prescribed for SOB in February 2024, has not needed in more than 4 months.   Pain --> oxycodone 10 mg IR Q4H PRN and gabapentin 300 mg TID at home. At [facility] methadone 2.5 mg BID was started in addition to oxycodone 10 mg Q4H PRN. Last administered 09/29/24 0831 prior to transfer to [facility]. Trial of lidocaine 5% patch to knees for >1 month, reports no significant relief with lidocaine patch.         Spenser Arenas, PharmD  09/30/24 12:38 AM     Secure Chat preferred   If no response call [phone] or [employer] \"Med Rec\"   "

## 2024-09-30 NOTE — PROGRESS NOTES
Physical Therapy                 Therapy Communication Note    Patient Name: Allyson Armendariz  MRN: [id number]  Department: [facility]  Room: 6011/6011-A  Today's Date: 9/30/2024     Discipline: Physical Therapy    Missed Visit Reason: Missed Visit Reason:  (Consented and posted to OR schedule for L hip bekah vs total arthroplasty with biopsy w/ Dr. Salazar on 9/30.  PT will hold until pt is medically appropriate post-op and schedule allows)    Missed Time: Attempt 0946

## 2024-09-30 NOTE — PROGRESS NOTES
09/30/24 1400   Discharge Planning   Living Arrangements Spouse/significant other   Support Systems Spouse/significant other   Type of Residence Private residence   Number of Stairs to Enter Residence 1   Number of Stairs Within Residence 13   Do you have animals or pets at home? No   Home or Post Acute Services None   Expected Discharge Disposition Home   Financial Resource Strain   How hard is it for you to pay for the very basics like food, housing, medical care, and heating? Not hard   Housing Stability   In the last 12 months, was there a time when you were not able to pay the mortgage or rent on time? N   In the past 12 months, how many times have you moved where you were living? 0   At any time in the past 12 months, were you homeless or living in a shelter (including now)? N   Transportation Needs   In the past 12 months, has lack of transportation kept you from medical appointments or from getting medications? no   In the past 12 months, has lack of transportation kept you from meetings, work, or from getting things needed for daily living? No     The patient was off the floor for an echo and SW completed the discharge assessment with the patient's  and daughter-in-law. Patient lives with her  in a single family home and they have three children that live close by and are very supportive. Patient has been very active and independent with her care until recently when she started having pain and it was discovered the patient had a hip fracture. Patient has pancreatic cancer and receives her medial care at [facility]. However, her medical team at [facility] recommended the patient come to  for her surgery.  Patient does have a rollator and wheelchair that her family bought since she was having difficulty ambulating but this has been new. We discussed that patient will be evaluated by PT/OT and they will make recommendations for her care at discharge Pt.'s family shared they know she prefers going home  ----- Message from Angélica Coburn sent at 9/13/2022  1:54 PM EDT -----  Subject: Refill Request    QUESTIONS  Name of Medication? albuterol sulfate HFA (VENTOLIN HFA) 108 (90 Base)   MCG/ACT inhaler  Patient-reported dosage and instructions? Every day  How many days do you have left? 0  Preferred Pharmacy? [address] phone number (if available)? [phone]  ---------------------------------------------------------------------------  --------------  CALL BACK INFO  What is the best way for the office to contact you? OK to leave message on   voicemail  Preferred Call Back Phone Number? [phone]  ---------------------------------------------------------------------------  --------------  SCRIPT ANSWERS  Relationship to Patient?  Self with home care but if she needs SNF she will be in agreement. Will continue to follow during patient's hospitalization and make appropriate referrals.  MICHAELA Willoughby   9/30/24@15:00

## 2024-09-30 NOTE — CONSULTS
[facility] Department of Orthopaedic Surgery   Initial Consult Note    HPI:     73F (Metastatic pancreatic cancer, chron's disease) with atraumatic left hip pain since May. Has been unable to walk for the past 2 weeks. Does not require any assistive devices at baseline. Was seen at [facility] where she was found to have a pathological left femoral neck fracture. Transferred to  for ortho onc services.     Had been on chemo since may and stopped in August due to progress of cancer. No radiation.     Orthopaedic Problems/Injuries: L pathologic FNF  Other Injuries: None    PMH: per above/EMR  PSH: per above/EMR  SocHx:      -  Denies tobacco use      -  Denies EtOH use      -  Denies other drug use  FamHx:  Non-contributory to this patient's acute orthopaedic problem.   Allergies: Reviewed in EMR  Meds: Reviewed in EMR    ROS  12-point review of systems is negative other than what is mentioned above.    Physical Exam:  Gen: AOx3, NAD  HEENT: normocephalic, atraumatic  Psych: appropriate mood and affect  Resp: nonlabored breathing  Cardiac: Extremities WWP, regular rate on peripheral palpation  Neuro: moves all extremities spontaneously   Skin: no rashes  MSK:   Left lower extremity:  - closed injury  - Compartments soft and compressible  - Fires TA/GS/EHL  - SILT in [place]/Sa/SP/DP/T distribution  - Palpable DP pulse      A full secondary exam was performed and all relevant findings discussed and noted above.    Imaging:  XR demonstrates minimally displaced left transcervical femoral neck fracture    CT scans from [facility] show above with lytic lesions throughout femoral head and neck.     Assessment:  Orthopaedic Problems/Injuries: L pathological FNF      Plan:  - Admitted to heme onc  - Consented and posted to OR schedule for L hip bekah vs total arthroplasty with biopsy w/ Dr. Salazar on 9/30  - NPO for upcoming surgery   - Clearance pending for OR; appreciate documentation of clearance by primary team  - Please  obtain pre-operative labs/studies: T&S, PT/INR, CBC, BMP, CXR, EKG, bHCG  (for <55yoF)  - WB: NWB LLE  - Abx: Will require post op ancef  - DVT: SCDs,    This patient was seen and evaluated within 30 minutes of initial consult.     Staffed and discussed with attending. Please don't hesitate to reach out with any questions.    Gallito Negron MD  Orthopaedic Surgery PGY-2   Epic Chat preferred    Please page [phone] (on-call pager) on weekends and between 6p-7a on weekdays.  _________________________________________________________    This patient will be followed by the ortho tumor service service while in-house. Please contact the following team members for any additional questions/concerns.

## 2024-09-30 NOTE — PROGRESS NOTES
Allyson Armendariz is a 73 y.o. female on day 1 of admission presenting with Pancreatic cancer (Multi).    Subjective   No acute events overnight. This AM, patient reports that pain feels well-controlled. Denies shortness of breath, chest pain, lower extremity swelling. She is waiting for her procedure with orthopedic surgery.    Objective   Last Recorded Vitals  /77   Pulse 95   Temp 36.1 °C (97 °F) (Temporal)   Resp 18   Wt 57.3 kg (126 lb 6.4 oz)   SpO2 99%   Intake/Output last 3 Shifts:    Intake/Output Summary (Last 24 hours) at 9/30/2024 0947  Last data filed at 9/30/2024 0600  Gross per 24 hour   Intake 0 ml   Output 500 ml   Net -500 ml   Admission Weight  Weight: 57.3 kg (126 lb 6.4 oz) (09/29/24 2156)  Daily Weight  09/29/24 : 57.3 kg (126 lb 6.4 oz)    Image Results  XR chest 1 view  Narrative: Interpreted By:  Sesar Simons,  and Nola Hawkins   STUDY:  XR CHEST 1 VIEW;  9/30/2024 1:41 am      INDICATION:  Signs/Symptoms:pre op. Per EMR: History metastatic pancreatic cancer,  Crohn's disease with pathological left femoral neck fracture.          COMPARISON:  None.      ACCESSION NUMBER(S):  [id number]      ORDERING CLINICIAN:  JESSICA DICK      FINDINGS:  AP radiograph of the chest was provided.      Right chest wall MediPort with distal tip overlying the superior  cavoatrial junction.      CARDIOMEDIASTINAL SILHOUETTE:  Cardiomediastinal silhouette is normal in size and configuration.      LUNGS:  No evidence of focal consolidation, pleural effusion, or pneumothorax.      ABDOMEN:  No remarkable upper abdominal findings.      BONES:  No acute osseous changes.      Impression: 1.  No evidence of acute cardiopulmonary process.      I personally reviewed the images/study and I agree with the findings  as stated by Shruthi Vaca DO, PGY-3. This study was interpreted  at [facility], [place], [place].      MACRO:  None      Signed by: Sesar Simons  9/30/2024 8:55 AM  Dictation workstation:   [id number]  XR hip left with pelvis when performed 2 or 3 views, XR femur left 2+ views  Narrative: STUDY:  XR HIP LEFT WITH PELVIS WHEN PERFORMED 2 OR 3 VIEWS; XR FEMUR LEFT 2+  VIEWS; ;  9/30/2024 1:41 am      INDICATION:  Signs/Symptoms:fem neck fracture; Signs/Symptoms:AP + lateral. Per  EMR: History of metastatic pancreatic carcinoma with pathologic left  femoral neck fracture.      COMPARISON:  None.      ACCESSION NUMBER(S):  [id number]; [id number]      ORDERING CLINICIAN:  JESSICA DICK      TECHNIQUE:  Single AP view of the pelvis  Three views of the left hip.      FINDINGS:  Surgical clips project over pelvic inlet.      Left femoral neck fracture with foreshortening. There is an area of  lucency between the greater and lesser tuberosities likely  representing metastatic disease. Mild osteoarthrosis of both hip  joints.      Impression: Left femoral neck fracture as described above.      I personally reviewed the images/study and I agree with the findings  as stated by Shruthi Vaca DO, PGY-2. This study was interpreted  at [place], [place].      MACRO:  None              Dictation workstation:   [id number]    Physical Exam:  General: well-appearing, no acute distress, resting comfortably in bed  HEENT: normocephalic, atraumatic  Cardio: regular rate and rhythm, normal S1 and S2, holosystolic murmur  Pulm: clear to auscultation bilaterally, no wheezes, crackles, or rhonchi, breathing comfortably on room air  Abd: normal, active bowel sounds; soft, non-tender, non-distended  Extremities: no peripheral edema, scars, or lesions  Neuro: alert and oriented to person, place, and time, CN II-XII grossly intact  Psych: mood and affect are appropriate    Labs:  Results for orders placed or performed during the hospital encounter of 09/29/24 (from the past 24 hour(s))   Type and screen   Result Value Ref Range     ABO TYPE B     Rh TYPE NEG     ANTIBODY SCREEN NEG    Basic Metabolic Panel   Result Value Ref Range    Glucose 112 (H) 74 - 99 mg/dL    Sodium 133 (L) 136 - 145 mmol/L    Potassium 4.7 3.5 - 5.3 mmol/L    Chloride 98 98 - 107 mmol/L    Bicarbonate 28 21 - 32 mmol/L    Anion Gap 12 10 - 20 mmol/L    Urea Nitrogen 43 (H) 6 - 23 mg/dL    Creatinine 1.21 (H) 0.50 - 1.05 mg/dL    eGFR 47 (L) >60 mL/min/1.73m*2    Calcium 8.9 8.6 - 10.6 mg/dL   CBC and Auto Differential   Result Value Ref Range    WBC 14.9 (H) 4.4 - 11.3 x10*3/uL    nRBC 0.0 0.0 - 0.0 /100 WBCs    RBC 3.05 (L) 4.00 - 5.20 x10*6/uL    Hemoglobin 9.5 (L) 12.0 - 16.0 g/dL    Hematocrit 30.5 (L) 36.0 - 46.0 %     80 - 100 fL    MCH 31.1 26.0 - 34.0 pg    MCHC 31.1 (L) 32.0 - 36.0 g/dL    RDW 14.6 (H) 11.5 - 14.5 %    Platelets 340 150 - 450 x10*3/uL    Neutrophils % 77.6 40.0 - 80.0 %    Immature Granulocytes %, Automated 1.0 (H) 0.0 - 0.9 %    Lymphocytes % 10.2 13.0 - 44.0 %    Monocytes % 10.9 2.0 - 10.0 %    Eosinophils % 0.2 0.0 - 6.0 %    Basophils % 0.1 0.0 - 2.0 %    Neutrophils Absolute 11.58 (H) 1.60 - 5.50 x10*3/uL    Immature Granulocytes Absolute, Automated 0.15 0.00 - 0.50 x10*3/uL    Lymphocytes Absolute 1.53 0.80 - 3.00 x10*3/uL    Monocytes Absolute 1.63 (H) 0.05 - 0.80 x10*3/uL    Eosinophils Absolute 0.03 0.00 - 0.40 x10*3/uL    Basophils Absolute 0.01 0.00 - 0.10 x10*3/uL   Coagulation Screen   Result Value Ref Range    Protime 12.1 9.8 - 12.8 seconds    INR 1.1 0.9 - 1.1    aPTT 30 27 - 38 seconds   Hepatic function panel   Result Value Ref Range    Albumin 2.8 (L) 3.4 - 5.0 g/dL    Bilirubin, Total 0.4 0.0 - 1.2 mg/dL    Bilirubin, Direct 0.1 0.0 - 0.3 mg/dL    Alkaline Phosphatase 175 (H) 33 - 136 U/L    ALT 18 7 - 45 U/L    AST 15 9 - 39 U/L    Total Protein 6.1 (L) 6.4 - 8.2 g/dL   Magnesium   Result Value Ref Range    Magnesium 2.07 1.60 - 2.40 mg/dL   Phosphorus   Result Value Ref Range    Phosphorus 4.8 2.5 - 4.9 mg/dL      Assessment/Plan:  Allyson Armendariz is a 74 yo F w PMH of metastatic pancreatic adenocarcinoma (with mets to liver) and Crohn's disease presenting as a transfer from [facility] for further evaluation with orthopedic surgery after being found to have L complete pathologic femur fracture. Patient is planned for surgery with ortho tomorrow. Patient found to have systolic murmur on cardiac exam which is being further evaluated prior to surgery.    Updates 9/30  - stat complete echo ordered for further evaluation of holosystolic murmur  - depending on echo findings, will consult cardiology for cardiac risk assessment and clearance for surgery  - pending orthopedic surgery for pathologic femur fracture    #Complete pathologic fracture of L femur  Patient had significant pain in L leg for past several months with negative imaging. Found to have pathologic fracture of L femur on CT scans at [facility] (scans can now be found in our system).   PLAN:  - ortho planning surgery  - clearance still pending because of murmur found on exam, pending echo findings  - depending on echo findings, will consult cardiology for cardiac risk assessment and clearance for surgery  - patient has active T&S, CXR and EKG from 9/30, will repeat PT/INR and AM labs tomorrow AM    #Holosystolic Murmur  Patient found to have holosystolic murmur on exam. Patient does not have known history of valvular dysfunction.  PLAN:  - stat complete echo pending    #Metastatic Pancreatic Adenocarcinoma  Patient has metastatic pancreatic adenocarcinoma w mets to the liver. Follows with Dr. Hightower at [facility]. S/p gem/abraxane, which was stopped August 2024. Plan was to biopsy liver lesions with EUS to guide further treatment, but patient's labs are not concerning for obstruction.  PLAN:  - will reach out to Dr. Hightower to inform about admission  - continue vitamin B and D and creon 07666 TID  - pain regimen: duloxetine 30 mg daily, gabapentin 300 mg TID, acetaminophen 975 mg q6h PRN  mild pain, oxycodone 10 mg q4h PRN severe pain  - will consider supportive oncology consult    This patient has a central line   Reason for the central line remaining today? Parenteral medication    F: PRN  E: K > 4, Mg > 2  N: regular diet, NPO at MN  A: mediport  GI: pantoprazole, miralax  DVT: lovenox (holding at MN)  Abx: not indicated  Dispo: pending PT/OT  Pain: duloxetine 30 mg daily, gabapentin 300 mg TID, acetaminophen 975 mg q6h PRN mild pain, oxycodone 10 mg q4h PRN severe pain    Code: DNR/DNI (confirmed on admission)  NOK: Oriana (, [phone]), Shameka (daughter, [phone])    Patient seen and discussed with attending, Dr. Rahel Maldonado MD  Internal Medicine PGY1  [facility] Team [id number]

## 2024-09-30 NOTE — H&P
Internal Medicine History and Physical   [facility]  September 29, 2024    Patient: Allyson De Souza    Medical Record: [id number]    Ms. Armendariz is a 72yo F with h/o Chron's disease, pancreatic adenocarcinoma with c/f mets to the liver presenting as a transfer from [facility] for onc ortho eval of L complete pathologic femur fracture.     Subjective     HPI:  Ms. Armendariz is a 72yo F with h/o Chron's disease, pancreatic adenocarcinoma with c/f mets to the liver presenting as a transfer from [facility] for onc ortho eval of L complete pathologic femur fracture. Pt reports she has been having L leg pain since May. States she first noticed it after getting up from crouching while gardening and it has persisted since. The pain goes from her femur down to her shin. Reports she has baseline paresthesias b/l that have no worsened. She started to use a rollator and then wheelchair as she wasn't able to bear weight on it anymore. Denies falls or trauma. Has used oxy and gabapentin which did not help Not currently on chemo, states she stopped in August as there was progression of her disease while on the treatment. Reports 10lb weight loss in a few weeks, endorses night sweats. Has had a few episodes a few months ago where she couldn't hold her urine or and make it to the bathroom and Denies f/c,n/v, chest pain, dyspnea, abd pain, back pain, saddle anesthesia, constipation, diarrhea, melena, hematochezia, dysuria. At OSH pt with CT hip showing complete pathologic transcervical femoral neck fracture. Ortho was consutled and recommended transfer for onc ortho evaluation. During admission at OSH pt with leukocytosis, thought to be reactive I/s/o fracture/pain. She was also supposed to have an EUS to evaluate for progression of her pancreatic cancer.        Imaging    EXAMINATION: CT HIP W/O LEFTPRO/LT   09/27/2024 02:30 PM   CLINICAL HISTORY: Lower extremity pain, left   ASSOCIATED DIAGNOSIS: Lower extremity  pain, left   ORDERING PROVIDER: VINH DUEÑAS   TECHNOLOGISTS NOTE:   COMPARISON: MR FEMUR LEFT W/+W/O 8/27/2024, 2:30 PM XR HIP LEFT AP+LAT 2 VIEWS 8/5/2024, 2:21 PM     FINDINGS:   Soft tissue:   Mild left hip soft tissue swelling. No focal fluid collection identified. Mild to moderate global muscle atrophy. Peripheral arterial calcification. Surgical clips in the pelvis.     Bone:   Aggressive lytic changes of the left femoral head and neck, compatible with metastatic disease given the marrow signal changes seen on the comparison MRI, with a complete, mildly displaced pathologic transcervical femoral neck fracture, with apex lateral angulation and impaction along the inferior aspect of the fracture. No CT evidence of other acute osseous abnormality or suspicious osseous lesion.     IMPRESSION:   Complete, mildly displaced pathologic transcervical left femoral neck fracture.       EXAMINATION: CT L-SPINE W/O CONTRAST 09/27/2024 02:30 PM   CLINICAL HISTORY: Lower extremity pain, left   ASSOCIATED DIAGNOSIS: Lower extremity pain, left   ORDERING PROVIDER: VINH DUEÑAS   TECHNOLOGISTS NOTE:   COMPARISON: MRI of the pancreas from 8/27/2024, CT of the left hip from 9/27/2024, MRI of the left femur from 8/27/2024   TECHNIQUE: Thin axial images were obtained through the lumbar region without intravenous contrast. 2D sagittal and coronal reconstructions were obtained from the axial data.     FINDINGS:   Counting reference: Lumbosacral junction. L4-5 is at the level of the iliac crests, and there are 5 nonrib-bearing lumbar-type vertebral bodies.     Alignment: Grade 1 degenerative anterolisthesis at L4-5, similar to prior.     Vertebrae: No evidence of prior fracture or destructive osseous lesion. Mild multilevel degenerative disc space narrowing and endplate spurring, greatest at L4-5.     Canal and foramina:   L1-2: No significant spinal canal or neural foraminal stenosis.     L2-3: No significant spinal canal or  neural foraminal stenosis.     L3-4: No significant spinal canal or neural foraminal stenosis.     L4-5: No significant spinal canal stenosis. Bilateral subarticular recess narrowing and mild neural foraminal stenoses with contributions from grade 1 degenerative anterolisthesis, endplate spurring, and facet hypertrophy.     L5-S1: No significant spinal canal or neural foraminal stenosis. Left subarticular recess narrowing due to asymmetric bulge eccentric to the left with slight posterior displacement of the descending left S1 nerve.       Visible sacrum and pelvis: Partially imaged lytic lesions and fragmentation at the femoral head compatible with metastases and pathologic fracture in view of the patient's history.     No dorsal paraspinous, paravertebral, or prevertebral fluid collection. Moderate calcific aortoiliac atherosclerotic irregularity without aneurysm. Partial visualization of main pancreatic ductal dilation distal atrophy related to pancreatic head malignancy, suboptimally assessed.     IMPRESSION:   1. Pathologic fracture of the proximal left femur. Please see dedicated CT of the left hip for further detail.   2.  Multilevel lumbar degenerative changes and grade 1 anterolisthesis at L4-5 without significant spinal canal stenosis. A left S1 radiculopathy would correspond with the left subarticular recess narrowing at L5-S1.   3.  Known pancreatic neoplasm is poorly assessed. No evidence of vertebral osseous metastatic disease.     PMH: As per HPI    PSH:  -hysterectomy  -bowel resection x2    Social Hx:  Tobacco: ~30 year of <1 ppd history. Quit ~30 years ago  Alcohol: denies  Drugs: denies      Family Hx:  Mother: esophageal cancer  Grandmother: lung cancer    Allergies:  NKDA    Medications:  Gabapentin 300 tid  Creon 04787 tid with meals  Duloxetine 30mg daily  Omeprazole 40mg qd  PEG  B12 1000mcg every day  Vitamind D 98985M qweek  Oxycodone 10mg q4h PRN    Objective   Vitals  Vitals:    09/30/24  0000   BP: 127/70   Pulse: 96   Resp: 18   Temp: 36.2 °C (97.2 °F)   SpO2: 97%        Intake/Output  No intake/output data recorded.    Physical Exam  General: Awake, alert, conversant, appears stated age, NAD  HEENT: Normocephalic, atraumatic. PEERL. EOMI. No scleral icterus.   Cardiac: Normal RR. S1&S2. No r/m/g  Respiratory: Lungs CTAB. No wheezes, rhonchi, rales, normal work of breathing on RA  Abdomen: +BS. Soft, non tender, non distended  Extremities: No peripheral edema b/l, no asymmetry noted. L hip and femur nontender to palpation. DP and PT pulses palpable b/l. Extremities warm.  Skin: No suspect lesions or rashes noted on visible skin  Neuro: Aox4. CN II-XII intact. Strength in RLE 5/5, LLE strength not tested due to fracture but sensation intact b/l. No focal defecits. Moving all limbs spontaneously, follows commands  Psych: Appropriate mood and behavior. Coherent thought process       Medications:   Scheduled medications    Continuous medications    PRN medications         Labs  Results for orders placed or performed during the hospital encounter of 09/29/24 (from the past 24 hour(s))   CBC and Auto Differential   Result Value Ref Range    WBC 14.9 (H) 4.4 - 11.3 x10*3/uL    nRBC 0.0 0.0 - 0.0 /100 WBCs    RBC 3.05 (L) 4.00 - 5.20 x10*6/uL    Hemoglobin 9.5 (L) 12.0 - 16.0 g/dL    Hematocrit 30.5 (L) 36.0 - 46.0 %     80 - 100 fL    MCH 31.1 26.0 - 34.0 pg    MCHC 31.1 (L) 32.0 - 36.0 g/dL    RDW 14.6 (H) 11.5 - 14.5 %    Platelets 340 150 - 450 x10*3/uL    Neutrophils % 77.6 40.0 - 80.0 %    Immature Granulocytes %, Automated 1.0 (H) 0.0 - 0.9 %    Lymphocytes % 10.2 13.0 - 44.0 %    Monocytes % 10.9 2.0 - 10.0 %    Eosinophils % 0.2 0.0 - 6.0 %    Basophils % 0.1 0.0 - 2.0 %    Neutrophils Absolute 11.58 (H) 1.60 - 5.50 x10*3/uL    Immature Granulocytes Absolute, Automated 0.15 0.00 - 0.50 x10*3/uL    Lymphocytes Absolute 1.53 0.80 - 3.00 x10*3/uL    Monocytes Absolute 1.63 (H) 0.05 - 0.80  x10*3/uL    Eosinophils Absolute 0.03 0.00 - 0.40 x10*3/uL    Basophils Absolute 0.01 0.00 - 0.10 x10*3/uL   Coagulation Screen   Result Value Ref Range    Protime 12.1 9.8 - 12.8 seconds    INR 1.1 0.9 - 1.1    aPTT 30 27 - 38 seconds                   Assessment/Plan   Ms. Armendariz is a 72yo F wit hh/o breast ca s/p lumpectomy, Chron's disease, pancreatic adenocarcinoma with mets to the liver presenting as a transfer from [facility] for onc ortho eval of L complete pathologic femur fracture.       #L pathologic femur fracture  ::CT hip showed complete, mildly displaced pathologic transcervical left femoral neck fracture.   - Ortho sx consulted, will take patient to OR today vs tomorrow  - Per palliative note at OSH: oxycodone 10mg q4h PRN, gabapentin 300mg tid, duloxetine 30mg daily for pain. Patient also started on methadone, will need to discuss in AM  - Consider supportive onc c/s   - PT/OT      #Leukocytosis  ::No other signs of infection currently, may be reactive I/s/o fracture  - fu cxr, UA      # Pancreatic cancer  #C/f mets to liver  ::Previously on gem/Abraxane, stopped in Aug 2024  ::Liver lesions were too small to biopsy and were just being monitored with imaging  ::Per records there were plans for EUS to evaluate progression of cancer  - Per palliative note at OSH: oxycodone 10mg q4h PRN, gabapentin 300mg tid, duloxetine 30mg daily for pain. Patient also started on methadone, will need to discuss in AM  - c/w creon 61412 tid  - nutrition c/s  - vitamin B12 1000mcg every day, vitamin d 33121 qweek  - GI consult for EUS for eval of disease progression in AM      #Crohn's disease  ::S/p bowel resections  - low concern for flare at this time, states bms are at baseline, not endorsing abd pain  - reports she's not on any medications      #Anemia  ::Baseline ~8-10  - Per chart 2/2 chemotherapy  - No active signs of bleeding  - Trend CBC      #CKD  - Cr at baseline  - Trend RFP    F: Replete PRN  E: Replete PRN  N:  NPO pending sx  A: PIV  DVT ppx: holding for sx    Code Status: DNR/DNI (confirmed on admission)  Contact: Oriana () [phone], Shameka (daughter) [phone]    Patient to be staffed with attending in AM.    Tatiana Stover MD  Internal Medicine, PGY-2

## 2024-09-30 NOTE — H&P
[facility] Department of Orthopaedic Surgery   Surgical History & Physical <30 Days  09/30/24    Reason for Surgery: L pathological FNF  Planned Procedure: bekah vs total arthroplasty left hip    History & Physical Reviewed:  I have reviewed the History and Physical for obtained within the last 30 days. Relevant findings and updates are noted below:  No significant changes.    Home medications were reviewed with significant updates noted below:  No significant changes.    ERAS patient?: No    COVID-19 Risk Consent:   Surgeon has reviewed the key risks related to delvin COVID-19 and subsequent sequelae.     09/30/24 at 3:00 AM - Gallito Negron MD

## 2024-09-30 NOTE — PROGRESS NOTES
Pharmacy Admission Order Reconciliation Review    Allyson Armendariz is a 73 y.o. female admitted for Pancreatic cancer (Multi). Pharmacy reviewed the patient's unreconciled admission medications.    Prior to admission medications that were reviewed and acted on by the pharmacist include:  Duloxetine   Vit D-2  Vit B-12  Oxycodone IR  Creon  These medications have been reconciled.     Any other unreconcilied medications have been addressed and will be ordered or held by the patient's medical team. Medications addressed by the pharmacist may be added or changed by the patient's medical team at any time.    Misti Flannery, Gale  Transitions of Care Pharmacist  [facility]  Please reach out via Secure Chat for questions

## 2024-10-01 ENCOUNTER — APPOINTMENT (OUTPATIENT)
Dept: RADIOLOGY | Facility: HOSPITAL | Age: 74
DRG: 521 | End: 2024-10-01
Payer: MEDICARE

## 2024-10-01 ENCOUNTER — ANESTHESIA EVENT (OUTPATIENT)
Dept: OPERATING ROOM | Facility: HOSPITAL | Age: 74
End: 2024-10-01
Payer: MEDICARE

## 2024-10-01 ENCOUNTER — APPOINTMENT (OUTPATIENT)
Dept: CARDIOLOGY | Facility: HOSPITAL | Age: 74
DRG: 521 | End: 2024-10-01
Payer: MEDICARE

## 2024-10-01 ENCOUNTER — ANESTHESIA (OUTPATIENT)
Dept: OPERATING ROOM | Facility: HOSPITAL | Age: 74
End: 2024-10-01
Payer: MEDICARE

## 2024-10-01 LAB
ABO GROUP (TYPE) IN BLOOD: NORMAL
ABO GROUP (TYPE) IN BLOOD: NORMAL
ALBUMIN SERPL BCP-MCNC: 2.7 G/DL (ref 3.4–5)
ALBUMIN SERPL BCP-MCNC: 2.7 G/DL (ref 3.4–5)
ANION GAP SERPL CALC-SCNC: 15 MMOL/L (ref 10–20)
ANION GAP SERPL CALC-SCNC: 15 MMOL/L (ref 10–20)
BACTERIA UR CULT: NORMAL
BACTERIA UR CULT: NORMAL
BASOPHILS # BLD AUTO: 0.02 X10*3/UL (ref 0–0.1)
BASOPHILS # BLD AUTO: 0.02 X10*3/UL (ref 0–0.1)
BASOPHILS NFR BLD AUTO: 0.2 %
BASOPHILS NFR BLD AUTO: 0.2 %
BUN SERPL-MCNC: 41 MG/DL (ref 6–23)
BUN SERPL-MCNC: 41 MG/DL (ref 6–23)
CALCIUM SERPL-MCNC: 8.7 MG/DL (ref 8.6–10.6)
CALCIUM SERPL-MCNC: 8.7 MG/DL (ref 8.6–10.6)
CHLORIDE SERPL-SCNC: 99 MMOL/L (ref 98–107)
CHLORIDE SERPL-SCNC: 99 MMOL/L (ref 98–107)
CO2 SERPL-SCNC: 26 MMOL/L (ref 21–32)
CO2 SERPL-SCNC: 26 MMOL/L (ref 21–32)
CREAT SERPL-MCNC: 1.41 MG/DL (ref 0.5–1.05)
CREAT SERPL-MCNC: 1.41 MG/DL (ref 0.5–1.05)
EGFRCR SERPLBLD CKD-EPI 2021: 39 ML/MIN/1.73M*2
EGFRCR SERPLBLD CKD-EPI 2021: 39 ML/MIN/1.73M*2
EOSINOPHIL # BLD AUTO: 0.08 X10*3/UL (ref 0–0.4)
EOSINOPHIL # BLD AUTO: 0.08 X10*3/UL (ref 0–0.4)
EOSINOPHIL NFR BLD AUTO: 0.6 %
EOSINOPHIL NFR BLD AUTO: 0.6 %
ERYTHROCYTE [DISTWIDTH] IN BLOOD BY AUTOMATED COUNT: 14.6 % (ref 11.5–14.5)
ERYTHROCYTE [DISTWIDTH] IN BLOOD BY AUTOMATED COUNT: 14.6 % (ref 11.5–14.5)
GLUCOSE SERPL-MCNC: 112 MG/DL (ref 74–99)
GLUCOSE SERPL-MCNC: 112 MG/DL (ref 74–99)
HCT VFR BLD AUTO: 28.1 % (ref 36–46)
HCT VFR BLD AUTO: 28.1 % (ref 36–46)
HGB BLD-MCNC: 8.9 G/DL (ref 12–16)
HGB BLD-MCNC: 8.9 G/DL (ref 12–16)
HOLD SPECIMEN: NORMAL
HOLD SPECIMEN: NORMAL
IMM GRANULOCYTES # BLD AUTO: 0.13 X10*3/UL (ref 0–0.5)
IMM GRANULOCYTES # BLD AUTO: 0.13 X10*3/UL (ref 0–0.5)
IMM GRANULOCYTES NFR BLD AUTO: 1 % (ref 0–0.9)
IMM GRANULOCYTES NFR BLD AUTO: 1 % (ref 0–0.9)
LYMPHOCYTES # BLD AUTO: 1.29 X10*3/UL (ref 0.8–3)
LYMPHOCYTES # BLD AUTO: 1.29 X10*3/UL (ref 0.8–3)
LYMPHOCYTES NFR BLD AUTO: 9.8 %
LYMPHOCYTES NFR BLD AUTO: 9.8 %
MAGNESIUM SERPL-MCNC: 1.9 MG/DL (ref 1.6–2.4)
MAGNESIUM SERPL-MCNC: 1.9 MG/DL (ref 1.6–2.4)
MCH RBC QN AUTO: 31.3 PG (ref 26–34)
MCH RBC QN AUTO: 31.3 PG (ref 26–34)
MCHC RBC AUTO-ENTMCNC: 31.7 G/DL (ref 32–36)
MCHC RBC AUTO-ENTMCNC: 31.7 G/DL (ref 32–36)
MCV RBC AUTO: 99 FL (ref 80–100)
MCV RBC AUTO: 99 FL (ref 80–100)
MONOCYTES # BLD AUTO: 1.46 X10*3/UL (ref 0.05–0.8)
MONOCYTES # BLD AUTO: 1.46 X10*3/UL (ref 0.05–0.8)
MONOCYTES NFR BLD AUTO: 11.1 %
MONOCYTES NFR BLD AUTO: 11.1 %
NEUTROPHILS # BLD AUTO: 10.18 X10*3/UL (ref 1.6–5.5)
NEUTROPHILS # BLD AUTO: 10.18 X10*3/UL (ref 1.6–5.5)
NEUTROPHILS NFR BLD AUTO: 77.3 %
NEUTROPHILS NFR BLD AUTO: 77.3 %
NRBC BLD-RTO: 0 /100 WBCS (ref 0–0)
NRBC BLD-RTO: 0 /100 WBCS (ref 0–0)
PHOSPHATE SERPL-MCNC: 4.5 MG/DL (ref 2.5–4.9)
PHOSPHATE SERPL-MCNC: 4.5 MG/DL (ref 2.5–4.9)
PLATELET # BLD AUTO: 294 X10*3/UL (ref 150–450)
PLATELET # BLD AUTO: 294 X10*3/UL (ref 150–450)
POTASSIUM SERPL-SCNC: 4.5 MMOL/L (ref 3.5–5.3)
POTASSIUM SERPL-SCNC: 4.5 MMOL/L (ref 3.5–5.3)
RBC # BLD AUTO: 2.84 X10*6/UL (ref 4–5.2)
RBC # BLD AUTO: 2.84 X10*6/UL (ref 4–5.2)
RH FACTOR (ANTIGEN D): NORMAL
RH FACTOR (ANTIGEN D): NORMAL
SODIUM SERPL-SCNC: 135 MMOL/L (ref 136–145)
SODIUM SERPL-SCNC: 135 MMOL/L (ref 136–145)
WBC # BLD AUTO: 13.2 X10*3/UL (ref 4.4–11.3)
WBC # BLD AUTO: 13.2 X10*3/UL (ref 4.4–11.3)

## 2024-10-01 PROCEDURE — 2500000001 HC RX 250 WO HCPCS SELF ADMINISTERED DRUGS (ALT 637 FOR MEDICARE OP)

## 2024-10-01 PROCEDURE — 80069 RENAL FUNCTION PANEL: CPT

## 2024-10-01 PROCEDURE — 1170000001 HC PRIVATE ONCOLOGY ROOM DAILY

## 2024-10-01 PROCEDURE — P9040 RBC LEUKOREDUCED IRRADIATED: HCPCS

## 2024-10-01 PROCEDURE — 85025 COMPLETE CBC W/AUTO DIFF WBC: CPT

## 2024-10-01 PROCEDURE — 99223 1ST HOSP IP/OBS HIGH 75: CPT | Performed by: STUDENT IN AN ORGANIZED HEALTH CARE EDUCATION/TRAINING PROGRAM

## 2024-10-01 PROCEDURE — 2500000004 HC RX 250 GENERAL PHARMACY W/ HCPCS (ALT 636 FOR OP/ED)

## 2024-10-01 PROCEDURE — 99233 SBSQ HOSP IP/OBS HIGH 50: CPT

## 2024-10-01 PROCEDURE — 36430 TRANSFUSION BLD/BLD COMPNT: CPT

## 2024-10-01 PROCEDURE — 83735 ASSAY OF MAGNESIUM: CPT

## 2024-10-01 PROCEDURE — 93970 EXTREMITY STUDY: CPT

## 2024-10-01 PROCEDURE — 93970 EXTREMITY STUDY: CPT | Performed by: RADIOLOGY

## 2024-10-01 RX ORDER — METOPROLOL TARTRATE 50 MG/1
50 TABLET ORAL 3 TIMES DAILY
Status: DISPENSED | OUTPATIENT
Start: 2024-10-01

## 2024-10-01 RX ADMIN — OXYCODONE HYDROCHLORIDE 10 MG: 5 TABLET ORAL at 10:49

## 2024-10-01 RX ADMIN — PANTOPRAZOLE SODIUM 40 MG: 40 TABLET, DELAYED RELEASE ORAL at 08:19

## 2024-10-01 RX ADMIN — CYANOCOBALAMIN TAB 1000 MCG 1000 MCG: 1000 TAB at 08:20

## 2024-10-01 RX ADMIN — GABAPENTIN 300 MG: 300 CAPSULE ORAL at 15:05

## 2024-10-01 RX ADMIN — OXYCODONE HYDROCHLORIDE 10 MG: 5 TABLET ORAL at 21:10

## 2024-10-01 RX ADMIN — METOPROLOL TARTRATE 50 MG: 50 TABLET, FILM COATED ORAL at 13:24

## 2024-10-01 RX ADMIN — METOPROLOL TARTRATE 50 MG: 50 TABLET, FILM COATED ORAL at 21:07

## 2024-10-01 RX ADMIN — GABAPENTIN 300 MG: 300 CAPSULE ORAL at 08:19

## 2024-10-01 RX ADMIN — PANCRELIPASE 2 CAPSULE: 120000; 24000; 76000 CAPSULE, DELAYED RELEASE PELLETS ORAL at 17:44

## 2024-10-01 RX ADMIN — DULOXETINE HYDROCHLORIDE 30 MG: 30 CAPSULE, DELAYED RELEASE ORAL at 08:20

## 2024-10-01 RX ADMIN — GABAPENTIN 300 MG: 300 CAPSULE ORAL at 21:07

## 2024-10-01 ASSESSMENT — COGNITIVE AND FUNCTIONAL STATUS - GENERAL
MOBILITY SCORE: 13
DRESSING REGULAR LOWER BODY CLOTHING: A LOT
CLIMB 3 TO 5 STEPS WITH RAILING: TOTAL
DAILY ACTIVITIY SCORE: 16
CLIMB 3 TO 5 STEPS WITH RAILING: TOTAL
DAILY ACTIVITIY SCORE: 16
DRESSING REGULAR LOWER BODY CLOTHING: A LOT
STANDING UP FROM CHAIR USING ARMS: A LOT
TURNING FROM BACK TO SIDE WHILE IN FLAT BAD: A LITTLE
TOILETING: A LOT
MOVING FROM LYING ON BACK TO SITTING ON SIDE OF FLAT BED WITH BEDRAILS: A LITTLE
HELP NEEDED FOR BATHING: A LOT
HELP NEEDED FOR BATHING: A LOT
DRESSING REGULAR UPPER BODY CLOTHING: A LOT
MOBILITY SCORE: 13
STANDING UP FROM CHAIR USING ARMS: A LOT
TURNING FROM BACK TO SIDE WHILE IN FLAT BAD: A LITTLE
WALKING IN HOSPITAL ROOM: A LOT
MOVING TO AND FROM BED TO CHAIR: A LOT
DRESSING REGULAR UPPER BODY CLOTHING: A LOT
WALKING IN HOSPITAL ROOM: A LOT
MOVING TO AND FROM BED TO CHAIR: A LOT
MOVING FROM LYING ON BACK TO SITTING ON SIDE OF FLAT BED WITH BEDRAILS: A LITTLE
TOILETING: A LOT

## 2024-10-01 ASSESSMENT — PAIN - FUNCTIONAL ASSESSMENT
PAIN_FUNCTIONAL_ASSESSMENT: 0-10

## 2024-10-01 ASSESSMENT — PAIN SCALES - PAIN ASSESSMENT IN ADVANCED DEMENTIA (PAINAD): TOTALSCORE: MEDICATION (SEE MAR)

## 2024-10-01 ASSESSMENT — PAIN SCALES - GENERAL
PAINLEVEL_OUTOF10: 4
PAINLEVEL_OUTOF10: 0 - NO PAIN
PAINLEVEL_OUTOF10: 0 - NO PAIN
PAINLEVEL_OUTOF10: 4
PAINLEVEL_OUTOF10: 0 - NO PAIN
PAINLEVEL_OUTOF10: 7
PAINLEVEL_OUTOF10: 7

## 2024-10-01 ASSESSMENT — PAIN DESCRIPTION - LOCATION: LOCATION: ABDOMEN

## 2024-10-01 NOTE — NURSING NOTE
Four eyes skin check completed by bedside RN and Tashia Hylton.     New findings, refer to LDA. Team MD notified and wound care order placed.     Birdie Mijares RN

## 2024-10-01 NOTE — CONSULTS
Wound Care Consult     Visit Date: 10/1/2024      Patient Name: Allyson Armendariz         MRN: [id number]           YOB: 1950     Reason for Consult: assess sacral wound              Pertinent Labs:   Albumin   Date Value Ref Range Status   10/01/2024 2.7 (L) 3.4 - 5.0 g/dL Final       Wound Assessment:  Wound 10/01/24 Moisture Associated Skin Damage Sacrum (Active)   Wound Image   10/01/24 0841   Site Assessment Blanchable erythema;Denuded 10/01/24 0841   Non-staged Wound Description Partial thickness 10/01/24 0841   Wound Length (cm) 0.5 cm 10/01/24 0841   Wound Width (cm) 0.5 cm 10/01/24 0841   Wound Surface Area (cm^2) 0.25 cm^2 10/01/24 0841   Wound Depth (cm) 0 cm 10/01/24 0841   Wound Volume (cm^3) 0 cm^3 10/01/24 0841   Drainage Description Colorless 10/01/24 0841   Drainage Amount Small 10/01/24 0841   Dressing Silicone border dressing 10/01/24 1110   Dressing Changed Reinforced 10/01/24 1110   Dressing Status Clean;Dry 10/01/24 1110       Wound Team Summary Assessment:   Recommendation for sacral wound:    Cleanse wound with normal saline   Cover with Mepilex Sacral dressing   Turn every 2 hours   Apply waffle mattress to bed surface     Wound Team Plan: please review recommendations      Maude Patricia RN  10/1/2024  3:53 PM

## 2024-10-01 NOTE — PROGRESS NOTES
Occupational Therapy                 Therapy Communication Note    Patient Name: Allyson Armendariz  MRN: [id number]  Department: [facility]  Room: 60/6011-A  Today's Date: 10/1/2024     Discipline: Occupational Therapy    Missed Visit Reason: Missed Visit Reason: Patient in a medical procedure (pending OR today, 10/1)    Missed Time: Attempt

## 2024-10-01 NOTE — PROGRESS NOTES
Physical Therapy                 Therapy Communication Note    Patient Name: Allyson Armendariz  MRN: [id number]  Department: [facility]  Room: 6011/6011-A  Today's Date: 10/1/2024     Discipline: Physical Therapy    Missed Visit Reason: Missed Visit Reason:  (Pt pending OR for L hip bekah vs total arthroplasty.  PT will hold until pt is medically appropriate post-op and schedule allows)    Missed Time: Attempt 0150

## 2024-10-01 NOTE — PROGRESS NOTES
Allyson Lujan is a 73 y.o. female on day 2 of admission presenting with Pancreatic cancer (Multi).    Subjective   No acute events overnight. This AM, patient reports feeling well. Endorses some mild pain. She denies shortness of breath, chest pain, constipation, diarrhea, and leg swelling.    Objective   Last Recorded Vitals  /64 (BP Location: Left arm, Patient Position: Lying)   Pulse 94   Temp 36.3 °C (97.3 °F) (Temporal)   Resp 18   Wt 57.3 kg (126 lb 6.4 oz)   SpO2 98%   Intake/Output last 3 Shifts:    Intake/Output Summary (Last 24 hours) at 10/1/2024 0842  Last data filed at 10/1/2024 0337  Gross per 24 hour   Intake 692.5 ml   Output 600 ml   Net 92.5 ml     Admission Weight  Weight: 57.3 kg (126 lb 6.4 oz) (09/29/24 2156)  Daily Weight  09/29/24 : 57.3 kg (126 lb 6.4 oz)    Image Results  Transthoracic Echo (TTE) [facility], [address] [place] [address]                 Tel [phone] and Fax [phone]    TRANSTHORACIC ECHOCARDIOGRAM REPORT       Patient Name:      ALLYSON LUJAN       Reading Physician:    [id number] Ander Samuel MD  Study Date:        9/30/2024            Ordering Provider:    [id number] JESSICA DICK  MRN/PID:           [id number]             Fellow:  Accession#:        [id number]         Nurse:                Aviva Zeng RN  Date of Birth/Age: 1950 / 73      Sonographer:          Gabriel ghosh                                      RDCS, RVT  Gender:            F                    Additional Staff:  Height:            160.02 cm            Admit Date:           9/29/2024  Weight:            57.15 kg             Admission Status:     Inpatient - STAT  BSA / BMI:         1.59 m2 / 22.32      Encounter#:           [id number]                     kg/m2  Blood Pressure:    145/84 mmHg           Department Location:  [facility]                                                                Invasive    Study Type:    TRANSTHORACIC ECHO (TTE) COMPLETE  Diagnosis/ICD: Cardiac murmur, unspecified-R01.1  Indication:    systolic murmur heard on exam, pre-op clearence  CPT Code:      Echo Complete w Full Doppler-[id number]    Patient History:  Pertinent History: Prostate cancer, Crohn's disease.    Study Detail: The following Echo studies were performed: 2D, M-Mode, Doppler and                color flow. Definity used as a contrast agent for endocardial                border definition. Total contrast used for this procedure was 2 mL                via IV push.       PHYSICIAN INTERPRETATION:  Left Ventricle: The left ventricular systolic function is normal, with a visually estimated ejection fraction of 65-70%. There are no regional left ventricular wall motion abnormalities. The left ventricular cavity size is normal. Spectral Doppler shows an impaired relaxation pattern of left ventricular diastolic filling.  Left Atrium: The left atrium is normal in size.  Right Ventricle: The right ventricle is normal in size. There is normal right ventricular global systolic function.  Right Atrium: The right atrium is normal in size.  Aortic Valve: The aortic valve was not well visualized. The aortic valve dimensionless index is 0.83. There is no evidence of aortic valve regurgitation. The peak instantaneous gradient of the aortic valve is 17.8 mmHg. The mean gradient of the aortic valve is 9.0 mmHg.  Mitral Valve: The mitral valve is normal in structure. There is no evidence of mitral valve regurgitation.  Tricuspid Valve: The tricuspid valve was not well visualized. No evidence of tricuspid regurgitation.  Pulmonic Valve: The pulmonic valve is not well visualized. The pulmonic valve regurgitation was not well visualized.  Pericardium: There is no pericardial effusion noted.  Aorta: The aortic root was not well  visualized.  Systemic Veins: The inferior vena cava appears normal in size, with IVC inspiratory collapse greater than 50%.  In comparison to the previous echocardiogram(s): There are no prior studies on this patient for comparison purposes.       CONCLUSIONS:   1. Poorly visualized anatomical structures due to suboptimal image quality.   2. The left ventricular systolic function is normal, with a visually estimated ejection fraction of 65-70%.   3. Spectral Doppler shows an impaired relaxation pattern of left ventricular diastolic filling.   4. There is normal right ventricular global systolic function.   5. Images are fairly suboptimal but in certain views (57, 71) there appears to be evidence of left ventricular outflow tract obstruction with a peak gradient of 84 mmHg with Valsalva.    QUANTITATIVE DATA SUMMARY:     2D MEASUREMENTS:          Normal Ranges:  Ao Root d:       2.70 cm  (2.0-3.7cm)  IVSd:            1.00 cm  (0.6-1.1cm)  LVPWd:           0.90 cm  (0.6-1.1cm)  LVIDd:           2.50 cm  (3.9-5.9cm)  LVIDs:           1.60 cm  LV Mass Index:   37 g/m2  LVEDV Index:     18 ml/m2  LV % FS          36.0 %       LA VOLUME:                    Normal Ranges:  LA Vol A4C:        22.5 ml    (22+/-6mL/m2)  LA Vol A2C:        19.8 ml  LA Vol BP:         21.3 ml  LA Vol Index A4C:  14.2ml/m2  LA Vol Index A2C:  12.4 ml/m2  LA Vol Index BP:   13.4 ml/m2  LA Area A4C:       10.8 cm2  LA Area A2C:       10.0 cm2  LA Major Axis A4C: 4.4 cm  LA Major Axis A2C: 4.3 cm       RA VOLUME BY A/L METHOD:         Normal Ranges:  RA Area A4C:             9.6 cm2       AORTA MEASUREMENTS:         Normal Ranges:  Asc Ao, d:          2.70 cm (2.1-3.4cm)       LV SYSTOLIC FUNCTION BY 2D PLANIMETRY (MOD):                       Normal Ranges:  EF-A4C View:    71 % (>=55%)  EF-A2C View:    84 %  EF-Biplane:     80 %  EF-Visual:      68 %  LV EF Reported: 68 %       LV DIASTOLIC FUNCTION:           Normal Ranges:  MV Peak E:              0.55 m/s  (0.7-1.2 m/s)  MV Peak A:             1.35 m/s  (0.42-0.7 m/s)  E/A Ratio:             0.41      (1.0-2.2)  MV e'                  0.070 m/s (>8.0)  MV lateral e'          0.08 m/s  MV medial e'           0.06 m/s  E/e' Ratio:            7.92      (<8.0)       AORTIC VALVE:                      Normal Ranges:  AoV Vmax:                2.11 m/s  (<=1.7m/s)  AoV Peak P.8 mmHg (<20mmHg)  AoV Mean P.0 mmHg  (1.7-11.5mmHg)  LVOT Max Michel:            2.06 m/s  (<=1.1m/s)  AoV VTI:                 28.70 cm  (18-25cm)  LVOT VTI:                23.90 cm  LVOT Diameter:           1.90 cm   (1.8-2.4cm)  AoV Area, VTI:           2.36 cm2  (2.5-5.5cm2)  AoV Area,Vmax:           2.77 cm2  (2.5-4.5cm2)  AoV Dimensionless Index: 0.83       RIGHT VENTRICLE:  RV Basal 2.60 cm  RV Mid   2.50 cm  RV Major 5.5 cm  TAPSE:   20.0 mm       TRICUSPID VALVE/RVSP:         Normal Ranges:  IVC Diam:             0.90 cm       [id number] Ander Samuel MD  Electronically signed on 2024 at 3:59:01 PM       ** Final **  XR hip left with pelvis when performed 2 or 3 views, XR femur left 2+ views  Narrative: Interpreted By:  Pedro Leslie and Ogievich Taessa   STUDY:  XR HIP LEFT WITH PELVIS WHEN PERFORMED 2 OR 3 VIEWS; XR FEMUR LEFT 2+  VIEWS; ;  2024 1:41 am      INDICATION:  Signs/Symptoms:fem neck fracture; Signs/Symptoms:AP + lateral. Per  EMR: History of metastatic pancreatic carcinoma with pathologic left  femoral neck fracture.      COMPARISON:  None.      ACCESSION NUMBER(S):  [id number]; [id number]      ORDERING CLINICIAN:  JESSICA DICK      TECHNIQUE:  Single AP view of the pelvis  Three views of the left hip.      FINDINGS:  Surgical clips project over pelvic inlet.      Left femoral neck fracture with foreshortening. There is an area of  lucency between the greater and lesser tuberosities concerning for  underlying metastatic disease. Mild osteoarthrosis of both hip joints.       Impression: Left subcapital femoral fracture with mild impaction and  displacement. Lucency in the intertrochanteric region of the left  femur concerning for underlying osseous lesion.      I personally reviewed the images/study and I agree with the findings  as stated by Shruthi Vaca DO, PGY-2. This study was interpreted  at [place], [place].      MACRO:  None          Signed by: Pedro Leslie 9/30/2024 9:54 AM  Dictation workstation:   [id number]  XR chest 1 view  Narrative: Interpreted By:  Sesar Simons,  and Nola Hawkins   STUDY:  XR CHEST 1 VIEW;  9/30/2024 1:41 am      INDICATION:  Signs/Symptoms:pre op. Per EMR: History metastatic pancreatic cancer,  Crohn's disease with pathological left femoral neck fracture.          COMPARISON:  None.      ACCESSION NUMBER(S):  [id number]      ORDERING CLINICIAN:  JESSICA DICK      FINDINGS:  AP radiograph of the chest was provided.      Right chest wall MediPort with distal tip overlying the superior  cavoatrial junction.      CARDIOMEDIASTINAL SILHOUETTE:  Cardiomediastinal silhouette is normal in size and configuration.      LUNGS:  No evidence of focal consolidation, pleural effusion, or pneumothorax.      ABDOMEN:  No remarkable upper abdominal findings.      BONES:  No acute osseous changes.      Impression: 1.  No evidence of acute cardiopulmonary process.      I personally reviewed the images/study and I agree with the findings  as stated by Shruthi Vaca DO, PGY-3. This study was interpreted  at [facility], [place], [place].      MACRO:  None      Signed by: Sesar Simons 9/30/2024 8:55 AM  Dictation workstation:   [id number]    Physical Exam:  General: well-appearing, no acute distress, resting comfortably in bed  HEENT: normocephalic, atraumatic  Cardio: regular rate and rhythm, normal S1 and S2, holosystolic murmur  Pulm: clear to auscultation  bilaterally, no wheezes, crackles, or rhonchi, breathing comfortably on room air  Abd: normal, active bowel sounds; soft, non-tender, non-distended  Extremities: no peripheral edema, scars, or lesions  Neuro: alert and oriented to person, place, and time, CN II-XII grossly intact  Psych: mood and affect are appropriate    Labs  Results for orders placed or performed during the hospital encounter of 09/29/24 (from the past 24 hour(s))   Transthoracic Echo (TTE) Complete   Result Value Ref Range    AV pk adri 2.11 m/s    AV mn grad 9.0 mmHg    LVOT diam 1.90 cm    MV E/A ratio 0.41     LA vol index A/L 13.4 ml/m2    Tricuspid annular plane systolic excursion 2.0 cm    LV EF 68 %    LVIDd 2.50 cm    Aortic Valve Area by Continuity of VTI 2.36 cm2    Aortic Valve Area by Continuity of Peak Velocity 2.77 cm2    AV pk grad 17.8 mmHg    LV A4C EF 71.4    Urinalysis with Reflex Culture and Microscopic   Result Value Ref Range    Color, Urine Yellow Light-Yellow, Yellow, Dark-Yellow    Appearance, Urine Turbid (N) Clear    Specific Gravity, Urine 1.020 1.005 - 1.035    pH, Urine 5.5 5.0, 5.5, 6.0, 6.5, 7.0, 7.5, 8.0    Protein, Urine 10 (TRACE) NEGATIVE, 10 (TRACE), 20 (TRACE) mg/dL    Glucose, Urine Normal Normal mg/dL    Blood, Urine NEGATIVE NEGATIVE    Ketones, Urine NEGATIVE NEGATIVE mg/dL    Bilirubin, Urine NEGATIVE NEGATIVE    Urobilinogen, Urine Normal Normal mg/dL    Nitrite, Urine NEGATIVE NEGATIVE    Leukocyte Esterase, Urine 250 Andi/µL (A) NEGATIVE   Extra Urine Gray Tube   Result Value Ref Range    Extra Tube Hold for add-ons.    Microscopic Only, Urine   Result Value Ref Range    WBC, Urine 21-50 (A) 1-5, NONE /HPF    RBC, Urine 6-10 (A) NONE, 1-2, 3-5 /HPF    Squamous Epithelial Cells, Urine 1-9 (SPARSE) Reference range not established. /HPF    Mucus, Urine FEW Reference range not established. /LPF   Magnesium   Result Value Ref Range    Magnesium 1.90 1.60 - 2.40 mg/dL   Renal Function Panel   Result Value  Ref Range    Glucose 112 (H) 74 - 99 mg/dL    Sodium 135 (L) 136 - 145 mmol/L    Potassium 4.5 3.5 - 5.3 mmol/L    Chloride 99 98 - 107 mmol/L    Bicarbonate 26 21 - 32 mmol/L    Anion Gap 15 10 - 20 mmol/L    Urea Nitrogen 41 (H) 6 - 23 mg/dL    Creatinine 1.41 (H) 0.50 - 1.05 mg/dL    eGFR 39 (L) >60 mL/min/1.73m*2    Calcium 8.7 8.6 - 10.6 mg/dL    Phosphorus 4.5 2.5 - 4.9 mg/dL    Albumin 2.7 (L) 3.4 - 5.0 g/dL   CBC and Auto Differential   Result Value Ref Range    WBC 13.2 (H) 4.4 - 11.3 x10*3/uL    nRBC 0.0 0.0 - 0.0 /100 WBCs    RBC 2.84 (L) 4.00 - 5.20 x10*6/uL    Hemoglobin 8.9 (L) 12.0 - 16.0 g/dL    Hematocrit 28.1 (L) 36.0 - 46.0 %    MCV 99 80 - 100 fL    MCH 31.3 26.0 - 34.0 pg    MCHC 31.7 (L) 32.0 - 36.0 g/dL    RDW 14.6 (H) 11.5 - 14.5 %    Platelets 294 150 - 450 x10*3/uL    Neutrophils % 77.3 40.0 - 80.0 %    Immature Granulocytes %, Automated 1.0 (H) 0.0 - 0.9 %    Lymphocytes % 9.8 13.0 - 44.0 %    Monocytes % 11.1 2.0 - 10.0 %    Eosinophils % 0.6 0.0 - 6.0 %    Basophils % 0.2 0.0 - 2.0 %    Neutrophils Absolute 10.18 (H) 1.60 - 5.50 x10*3/uL    Immature Granulocytes Absolute, Automated 0.13 0.00 - 0.50 x10*3/uL    Lymphocytes Absolute 1.29 0.80 - 3.00 x10*3/uL    Monocytes Absolute 1.46 (H) 0.05 - 0.80 x10*3/uL    Eosinophils Absolute 0.08 0.00 - 0.40 x10*3/uL    Basophils Absolute 0.02 0.00 - 0.10 x10*3/uL     Assessment/Plan:  Allyson Armendariz is a 74 yo F w PMH of metastatic pancreatic adenocarcinoma (with mets to liver) and Crohn's disease presenting as a transfer from [facility] for further evaluation with orthopedic surgery after being found to have L complete pathologic femur fracture. Patient is planned for surgery with ortho tomorrow. Patient found to have systolic murmur on cardiac exam which is being further evaluated prior to surgery. Echo showed LV outflow tract obstruction, cardiology consulted for recs.     Updates 10/01  - cardiology recommended transfusion of 2 units of pRBCs for  Hgb > 10 and also recommended starting metoprolol tartrate 50 TID, will get repeat echo following transfusion  - pending orthopedic surgery for pathologic femur fracture     #Complete pathologic fracture of L femur  Patient had significant pain in L leg for past several months with negative imaging. Found to have pathologic fracture of L femur on CT scans at [facility] (scans can now be found in our system).   PLAN:  - ortho planning surgery  - clearance still pending because of murmur found on exam, pending repeat echo findings  - depending on echo findings, will consult cardiology for cardiac risk assessment and clearance for surgery  - patient has active T&S, CXR and EKG from 9/30, will repeat PT/INR and AM labs tomorrow AM     #Holosystolic Murmur  Patient found to have holosystolic murmur on exam. Patient does not have known history of valvular dysfunction.  PLAN:  - cardiology recommended transfusion of 2 units of pRBCs for Hgb > 10 and also recommended starting metoprolol tartrate 50 TID, will get repeat echo following transfusion     #Metastatic Pancreatic Adenocarcinoma  Patient has metastatic pancreatic adenocarcinoma w mets to the liver. Follows with Dr. Hightower at [facility]. S/p gem/abraxane, which was stopped August 2024. Plan was to biopsy liver lesions with EUS to guide further treatment, but patient's labs are not concerning for obstruction.  PLAN:  - will reach out to Dr. Hightower to inform about admission  - continue vitamin B and D and creon [id number] TID  - pain regimen: duloxetine 30 mg daily, gabapentin 300 mg TID, acetaminophen 975 mg q6h PRN mild pain, oxycodone 10 mg q4h PRN severe pain  - will consider supportive oncology consult if patient has worsening pain     F: PRN  E: K > 4, Mg > 2  N: regular diet, NPO at MN  A: mediport  GI: pantoprazole, miralax  DVT: lovenox (holding at MN)  Abx: not indicated  Dispo: pending PT/OT  Pain: duloxetine 30 mg daily, gabapentin 300 mg TID, acetaminophen 975 mg q6h PRN mild  pain, oxycodone 10 mg q4h PRN severe pain     Code: DNR/DNI (confirmed on admission)  NOK: Oriana (, [phone]), Shameka (daughter, [phone])     Patient seen and discussed with attending, Dr. Rahel Maldonado MD  Internal Medicine PGY1  [facility] Team [id number]

## 2024-10-01 NOTE — CONSULTS
"Nutrition Initial Assessment:   Nutrition Assessment    Reason for Assessment: Admission nursing screening    Patient is a 73 y.o. female with PMH of metastatic pancreatic adenocarcinoma with mets to liver and Crohn's disease.     Pt presenting as a transfer from [facility] for orthopedic surgery after found to have L complete pathologic femur fracture.     Surgery pending    Pt found to have systolic murmur on cardiac exam-- further evaluation needed prior to surgery     Nutrition History:  Food and Nutrient History: Met with patient this morning. Pt reports PTA eating 2x a day small bites. Pt reports poor appetite d/t pain and intake fluctated d/t pain level. Pt reports the taste of food is satly at times and pt has limited intake of cheese and soup d/t salty taste. Pt reports PTA consuming 1 ensure a day.  Food Allergies/Intolerances:  None  GI Symptoms: None  Oral Problems: None       Anthropometrics:  Height: 160 cm (5' 3\")   Weight: 57.3 kg (126 lb 6.4 oz)      IBW/kg (Dietitian Calculated): 52.3 kg  Percent of IBW: 109 %       Weight History:   Wt Readings from Last 15 Encounters:   09/29/24 57.3 kg (126 lb 6.4 oz)   09/29/24 57.2 kg (126 lb)       Weight Change %:  Weight History / % Weight Change: Pt reports since diagnosis ~ 1 year ago pt lost ~ 50#; no wt history per chart review    Nutrition Focused Physical Exam Findings:    Subcutaneous Fat Loss:   Orbital Fat Pads: Mild-Moderate (slight dark circles and slight hollowing)  Buccal Fat Pads: Mild-Moderate (flat cheeks, minimal bounce)  Triceps: Mild-Moderate (less than ample fat tissue)  Muscle Wasting:  Temporalis: Mild-Moderate (slight depression)  Pectoralis (Clavicular Region): Mild-Moderate (some protrusion of clavicle)  Interosseous: Well nourished (muscle bulges)  Quadriceps: Mild-Moderate (mild depression on inner and outer thigh)  Edema:  Edema Location:  (none documented in flowsheet)  Physical Findings:  Skin: Negative    Nutrition Significant " "Labs:  CBC Trend:   Results from last 7 days   Lab Units 10/01/24  0514 09/30/24  0230   WBC AUTO x10*3/uL 13.2* 14.9*   RBC AUTO x10*6/uL 2.84* 3.05*   HEMOGLOBIN g/dL 8.9* 9.5*   HEMATOCRIT % 28.1* 30.5*   MCV fL 99 100   PLATELETS AUTO x10*3/uL 294 340    , BMP Trend:   Results from last 7 days   Lab Units 10/01/24  0514 09/30/24  0230   GLUCOSE mg/dL 112* 112*   CALCIUM mg/dL 8.7 8.9   SODIUM mmol/L 135* 133*   POTASSIUM mmol/L 4.5 4.7   CO2 mmol/L 26 28   CHLORIDE mmol/L 99 98   BUN mg/dL 41* 43*   CREATININE mg/dL 1.41* 1.21*    , A1C:No results found for: \"HGBA1C\", BG POCT trend:    , Vit D: No results found for: \"VITD25\"     Nutrition Specific Medications:  Scheduled medications  cyanocobalamin, 1,000 mcg, oral, Daily  DULoxetine, 30 mg, oral, Daily  ergocalciferol, 1,250 mcg, oral, Weekly  gabapentin, 300 mg, oral, TID  lipase-protease-amylase, 2 capsule, oral, TID  metoprolol tartrate, 50 mg, oral, TID  pantoprazole, 40 mg, oral, Daily before breakfast  polyethylene glycol, 17 g, oral, Daily    I/O:   Last BM Date: 10/01/24; Stool Appearance: Loose, Soft (10/01/24 1110)    Dietary Orders (From admission, onward)       Start     Ordered    10/01/24 0001  NPO Diet; Effective midnight  Diet effective midnight         09/30/24 0949                     Estimated Needs:   Total Energy Estimated Needs (kCal):  (6300-3568)  Method for Estimating Needs: ABW x 28-30  Total Protein Estimated Needs (g):  (70+)  Method for Estimating Needs: ABW x 1.2  Total Fluid Estimated Needs (mL):  (per team)           Nutrition Diagnosis   Malnutrition Diagnosis  Patient has Malnutrition Diagnosis: Yes  Diagnosis Status: New  Malnutrition Diagnosis: Moderate malnutrition related to chronic disease or condition  As Evidenced by: <75% of estimated energy needs for >= 1 month; mild muscle wasting and fat loss            Nutrition Interventions/Recommendations         Nutrition Prescription:    Pending diet advancement, recommend " Ensure Plus (350kcal,13g PRO) BID (strawberry or vanilla)  Consider checking Vitamin D and supplement as need  Consider checking A1C   Pending diet advancement consider an appetite stimulant if medically appropriate and PO intake continues to decline.       Nutrition Monitoring and Evaluation   Food/Nutrient Related History Monitoring  Monitoring and Evaluation Plan: Energy intake  Energy Intake: Estimated energy intake  Criteria: >75% of EEN    Body Composition/Growth/Weight History  Monitoring and Evaluation Plan: Weight    Biochemical Data, Medical Tests and Procedures  Monitoring and Evaluation Plan: Electrolyte/renal panel, Glucose/endocrine profile  Electrolyte and Renal Panel: Sodium, Potassium, Phosphorus, Magnesium  Criteria: WNL  Glucose/Endocrine Profile: Glucose, casual  Criteria: WNL      Time Spent (min): 60 minutes

## 2024-10-01 NOTE — CARE PLAN
Problem: Fall/Injury  Goal: Not fall by end of shift  Outcome: Progressing  Goal: Be free from injury by end of the shift  Outcome: Progressing  Goal: Verbalize understanding of personal risk factors for fall in the hospital  Outcome: Progressing  Goal: Verbalize understanding of risk factor reduction measures to prevent injury from fall in the home  Outcome: Progressing  Goal: Use assistive devices by end of the shift  Outcome: Progressing  Goal: Pace activities to prevent fatigue by end of the shift  Outcome: Progressing     Problem: Pain - Adult  Goal: Verbalizes/displays adequate comfort level or baseline comfort level  Outcome: Progressing     Problem: Safety - Adult  Goal: Free from fall injury  Outcome: Progressing   The patient's goals for the shift include      The clinical goals for the shift include Patient to remain free of falls throughout shift      Pt npo after mn for possible OR to repair left femur fx. Lovenox on hold until post procedure. Ivf LR@75ml/hr infusing. Pt received 1000 liter bolus  this evening of  LR@500ml/hr.

## 2024-10-01 NOTE — PROGRESS NOTES
10/01/24 1200   Discharge Planning   Living Arrangements Spouse/significant other   Support Systems Spouse/significant other   Assistance Needed Needs TBD   Type of Residence Private residence   Number of Stairs to Enter Residence 1   Number of Stairs Within Residence 13   Do you have animals or pets at home? No   Home or Post Acute Services In home services;Post acute facilities (Rehab/SNF/etc)     Care Transitions Note  10/1/2024  Status: Inpatient  Payor Source: [employer] [place] [employer]  Discharge Disposition: TBD, PT/OT to eval after surgery  ADOD: TBD  Previous SW assessment completed yesterday. Cardiology following for abnormal ECHO findings. Patient to receive repeat ECHO before ortho surgery. Scheduled for OR as last case today, possibly tomorrow. PT/OT to evaluate after surgery. Will follow.   MARGIE Jack

## 2024-10-01 NOTE — CONSULTS
Inpatient consult to Cardiology  Consult performed by: Bronwyn Sr MD  Consult ordered by: Jean Mcginnis MD        Cardiology Consult Note    Reason for consult: Preoperative risk stratification     History Of Present Illness:    Allyson Armendariz is a 73 y.o. female, previous smoker, with a PMH of Chron's disease s/p bowel resection x2 and pancreatic adenocarcinoma with c/f metastasis to the liver s/p most recent chemo treatment stopped in August 2024, who is currently admitted for orthopedic evaluation of complete pathologic fracture of the left femur revealed on CT scans at OSH. Patient states that she was experiencing leg hip/leg pain since May that progressively increased over the subsequent months. Imaging at initial onset of pain revealed no fractures, however as the pain persistent patient underwent repeat imaging which revealed the fracture. She denies history of falls or trauma. Orthopedic surgery is planning for OR intervention ( L hip bekah VS total arthoplasty). Pre-operative workup by primary team revealed a holosystolic murmur for which patient underwent echo.    Cardiology was consulted for preoperative risk stratification in setting of echo findings concerning for left ventricular outflow tract obstruction demonstrated on echo.    The patient denies any personal cardiac history or significant family history. She states her father was hospitalized in his late 40s for something related to the heart and did pass during that admission, but she is unsure of the exact reason. She currently uses a Rollator or wheelchair to ambulate at baseline due to the left hip pain, which has made her less mobile. However, she states that prior to the left hip pain she was able to perform her daily activities. She denies any history of chest pain or unusual shortness of breath. She states that she has recently started using a couple pillows behind her head at night, but this is due to the need for additional  pillows to prop her left leg up comfortably. She denies orthopnea, nocturnal dyspnea, snoring or history of sleep apnea.    Past Cardiology Workup:  EKG: None available to view    Echo: 09/30/2024  CONCLUSIONS:   1. Poorly visualized anatomical structures due to suboptimal image quality.   2. The left ventricular systolic function is normal, with a visually estimated ejection fraction of 65-70%.   3. Spectral Doppler shows an impaired relaxation pattern of left ventricular diastolic filling.   4. There is normal right ventricular global systolic function.   5. Images are fairly suboptimal but in certain views (57, 71) there appears to be evidence of left ventricular outflow tract obstruction with a peak gradient of 84 mmHg with Valsalva.    Stress Test: None    Cath: None    Cardiac Imaging: None      Past Medical History:  Chron's disease s/p bowel resection x2 and pancreatic adenocarcinoma with c/f metastasis to the liver s/p most recent chemo treatment in August 2024    Past Surgical History:  Bowel resection, hysterectomy       Social History:  She states that she is a former 1-1.5 ppd cigarette smoker. She quit over 30 years ago. Denies etoh or other drug use.    Family History:  No family history on file.     Allergies:  Patient has no known allergies.    ROS:  10 point review of systems including (Constitutional, Eyes, ENMT, Respiratory, Cardiac, Gastrointestinal, Neurological, Psychiatric, and Hematologic) was performed and is otherwise negative.    Objective Data:  Last Recorded Vitals:  Vitals:    09/30/24 1528 09/30/24 2014 10/01/24 0000 10/01/24 0400   BP: 147/74 112/77 152/70 151/64   BP Location:  Right arm Left arm Left arm   Patient Position:  Lying Lying Lying   Pulse: 92 105 103 94   Resp: 17 18 18 18   Temp: 36 °C (96.8 °F) 36.4 °C (97.5 °F) 36.2 °C (97.2 °F) 36.3 °C (97.3 °F)   TempSrc: Temporal Temporal Temporal Temporal   SpO2: 99% 98% 97% 98%   Weight:       Height:            Weight  Avg:  "57.2 kg (126 lb 3.2 oz)  Min: 57.2 kg (126 lb)  Max: 57.3 kg (126 lb 6.4 oz)    LABS:  CMP:  Results from last 7 days   Lab Units 10/01/24  0514 09/30/24  0230   SODIUM mmol/L 135* 133*   POTASSIUM mmol/L 4.5 4.7   CHLORIDE mmol/L 99 98   CO2 mmol/L 26 28   ANION GAP mmol/L 15 12   BUN mg/dL 41* 43*   CREATININE mg/dL 1.41* 1.21*   EGFR mL/min/1.73m*2 39* 47*   MAGNESIUM mg/dL 1.90 2.07   ALBUMIN g/dL 2.7* 2.8*   ALT U/L  --  18   AST U/L  --  15   BILIRUBIN TOTAL mg/dL  --  0.4     CBC:  Results from last 7 days   Lab Units 10/01/24  0514 09/30/24  0230   WBC AUTO x10*3/uL 13.2* 14.9*   HEMOGLOBIN g/dL 8.9* 9.5*   HEMATOCRIT % 28.1* 30.5*   PLATELETS AUTO x10*3/uL 294 340   MCV fL 99 100     COAG:   Results from last 7 days   Lab Units 09/30/24  0230   INR  1.1     ABO:   ABO TYPE   Date Value Ref Range Status   09/30/2024 B  Final     HEME/ENDO:     CARDIAC:       No lab exists for component: \"CK\", \"CKMBP\"          Last I/O:    Intake/Output Summary (Last 24 hours) at 10/1/2024 0834  Last data filed at 10/1/2024 0337  Gross per 24 hour   Intake 692.5 ml   Output 600 ml   Net 92.5 ml     Net IO Since Admission: -407.5 mL [10/01/24 0834]      Imaging Results:  Transthoracic Echo (TTE) Complete    Result Date: 9/30/2024   [facility] [address] [place] [address]                Tel [phone] and Fax [phone] TRANSTHORACIC ECHOCARDIOGRAM REPORT  Patient Name:      LISSETH RITTERYARELISARAHY       Reading Physician:    [id number] Ander Samuel MD Study Date:        9/30/2024            Ordering Provider:    [id number] JESSICA DICK MRN/PID:           [id number]             Fellow: Accession#:        [id number]         Nurse:                Aviva Zeng RN Date of Birth/Age: 1950 / 73      Sonographer:          Gabriel ghosh                          "             RDCS, RVT Gender:            F                    Additional Staff: Height:            160.02 cm            Admit Date:           9/29/2024 Weight:            57.15 kg             Admission Status:     Inpatient - STAT BSA / BMI:         1.59 m2 / 22.32      Encounter#:           [id number]                    kg/m2 Blood Pressure:    145/84 mmHg          Department Location:  [facility]                                                               Invasive Study Type:    TRANSTHORACIC ECHO (TTE) COMPLETE Diagnosis/ICD: Cardiac murmur, unspecified-R01.1 Indication:    systolic murmur heard on exam, pre-op clearence CPT Code:      Echo Complete w Full Doppler-[id number] Patient History: Pertinent History: Prostate cancer, Crohn's disease. Study Detail: The following Echo studies were performed: 2D, M-Mode, Doppler and               color flow. Definity used as a contrast agent for endocardial               border definition. Total contrast used for this procedure was 2 mL               via IV push.  PHYSICIAN INTERPRETATION: Left Ventricle: The left ventricular systolic function is normal, with a visually estimated ejection fraction of 65-70%. There are no regional left ventricular wall motion abnormalities. The left ventricular cavity size is normal. Spectral Doppler shows an impaired relaxation pattern of left ventricular diastolic filling. Left Atrium: The left atrium is normal in size. Right Ventricle: The right ventricle is normal in size. There is normal right ventricular global systolic function. Right Atrium: The right atrium is normal in size. Aortic Valve: The aortic valve was not well visualized. The aortic valve dimensionless index is 0.83. There is no evidence of aortic valve regurgitation. The peak instantaneous gradient of the aortic valve is 17.8 mmHg. The mean gradient of the aortic valve is 9.0 mmHg. Mitral Valve: The mitral valve is normal in structure. There is no evidence of mitral  valve regurgitation. Tricuspid Valve: The tricuspid valve was not well visualized. No evidence of tricuspid regurgitation. Pulmonic Valve: The pulmonic valve is not well visualized. The pulmonic valve regurgitation was not well visualized. Pericardium: There is no pericardial effusion noted. Aorta: The aortic root was not well visualized. Systemic Veins: The inferior vena cava appears normal in size, with IVC inspiratory collapse greater than 50%. In comparison to the previous echocardiogram(s): There are no prior studies on this patient for comparison purposes.  CONCLUSIONS:  1. Poorly visualized anatomical structures due to suboptimal image quality.  2. The left ventricular systolic function is normal, with a visually estimated ejection fraction of 65-70%.  3. Spectral Doppler shows an impaired relaxation pattern of left ventricular diastolic filling.  4. There is normal right ventricular global systolic function.  5. Images are fairly suboptimal but in certain views (57, 71) there appears to be evidence of left ventricular outflow tract obstruction with a peak gradient of 84 mmHg with Valsalva. QUANTITATIVE DATA SUMMARY:  2D MEASUREMENTS:          Normal Ranges: Ao Root d:       2.70 cm  (2.0-3.7cm) IVSd:            1.00 cm  (0.6-1.1cm) LVPWd:           0.90 cm  (0.6-1.1cm) LVIDd:           2.50 cm  (3.9-5.9cm) LVIDs:           1.60 cm LV Mass Index:   37 g/m2 LVEDV Index:     18 ml/m2 LV % FS          36.0 %  LA VOLUME:                    Normal Ranges: LA Vol A4C:        22.5 ml    (22+/-6mL/m2) LA Vol A2C:        19.8 ml LA Vol BP:         21.3 ml LA Vol Index A4C:  14.2ml/m2 LA Vol Index A2C:  12.4 ml/m2 LA Vol Index BP:   13.4 ml/m2 LA Area A4C:       10.8 cm2 LA Area A2C:       10.0 cm2 LA Major Axis A4C: 4.4 cm LA Major Axis A2C: 4.3 cm  RA VOLUME BY A/L METHOD:         Normal Ranges: RA Area A4C:             9.6 cm2  AORTA MEASUREMENTS:         Normal Ranges: Asc Ao, d:          2.70 cm (2.1-3.4cm)  LV  SYSTOLIC FUNCTION BY 2D PLANIMETRY (MOD):                      Normal Ranges: EF-A4C View:    71 % (>=55%) EF-A2C View:    84 % EF-Biplane:     80 % EF-Visual:      68 % LV EF Reported: 68 %  LV DIASTOLIC FUNCTION:           Normal Ranges: MV Peak E:             0.55 m/s  (0.7-1.2 m/s) MV Peak A:             1.35 m/s  (0.42-0.7 m/s) E/A Ratio:             0.41      (1.0-2.2) MV e'                  0.070 m/s (>8.0) MV lateral e'          0.08 m/s MV medial e'           0.06 m/s E/e' Ratio:            7.92      (<8.0)  AORTIC VALVE:                      Normal Ranges: AoV Vmax:                2.11 m/s  (<=1.7m/s) AoV Peak P.8 mmHg (<20mmHg) AoV Mean P.0 mmHg  (1.7-11.5mmHg) LVOT Max Michel:            2.06 m/s  (<=1.1m/s) AoV VTI:                 28.70 cm  (18-25cm) LVOT VTI:                23.90 cm LVOT Diameter:           1.90 cm   (1.8-2.4cm) AoV Area, VTI:           2.36 cm2  (2.5-5.5cm2) AoV Area,Vmax:           2.77 cm2  (2.5-4.5cm2) AoV Dimensionless Index: 0.83  RIGHT VENTRICLE: RV Basal 2.60 cm RV Mid   2.50 cm RV Major 5.5 cm TAPSE:   20.0 mm  TRICUSPID VALVE/RVSP:         Normal Ranges: IVC Diam:             0.90 cm  [id number] Ander Samuel MD Electronically signed on 2024 at 3:59:01 PM  ** Final **     XR hip left with pelvis when performed 2 or 3 views    Result Date: 2024  Interpreted By:  Pedro Leslie and Ogievich Taessa STUDY: XR HIP LEFT WITH PELVIS WHEN PERFORMED 2 OR 3 VIEWS; XR FEMUR LEFT 2+ VIEWS; ;  2024 1:41 am   INDICATION: Signs/Symptoms:fem neck fracture; Signs/Symptoms:AP + lateral. Per EMR: History of metastatic pancreatic carcinoma with pathologic left femoral neck fracture.   COMPARISON: None.   ACCESSION NUMBER(S): [id number]; [id number]   ORDERING CLINICIAN: JESSICA DICK   TECHNIQUE: Single AP view of the pelvis Three views of the left hip.   FINDINGS: Surgical clips project over pelvic inlet.   Left femoral neck fracture with  foreshortening. There is an area of lucency between the greater and lesser tuberosities concerning for underlying metastatic disease. Mild osteoarthrosis of both hip joints.       Left subcapital femoral fracture with mild impaction and displacement. Lucency in the intertrochanteric region of the left femur concerning for underlying osseous lesion.   I personally reviewed the images/study and I agree with the findings as stated by Shruthi Vaca DO, PGY-2. This study was interpreted at [place], [place].   MACRO: None     Signed by: Pedro Leslie 9/30/2024 9:54 AM Dictation workstation:   [id number]    XR femur left 2+ views    Result Date: 9/30/2024  Interpreted By:  Pedro Leslie and Ogievich Taessa STUDY: XR HIP LEFT WITH PELVIS WHEN PERFORMED 2 OR 3 VIEWS; XR FEMUR LEFT 2+ VIEWS; ;  9/30/2024 1:41 am   INDICATION: Signs/Symptoms:fem neck fracture; Signs/Symptoms:AP + lateral. Per EMR: History of metastatic pancreatic carcinoma with pathologic left femoral neck fracture.   COMPARISON: None.   ACCESSION NUMBER(S): [id number]; [id number]   ORDERING CLINICIAN: JESSICA DICK   TECHNIQUE: Single AP view of the pelvis Three views of the left hip.   FINDINGS: Surgical clips project over pelvic inlet.   Left femoral neck fracture with foreshortening. There is an area of lucency between the greater and lesser tuberosities concerning for underlying metastatic disease. Mild osteoarthrosis of both hip joints.       Left subcapital femoral fracture with mild impaction and displacement. Lucency in the intertrochanteric region of the left femur concerning for underlying osseous lesion.   I personally reviewed the images/study and I agree with the findings as stated by Shruthi Vaca DO, PGY-2. This study was interpreted at [place], [place].   MACRO: None     Signed by: Pedro Leslie 9/30/2024 9:54 AM Dictation  workstation:   [id number]    XR chest 1 view    Result Date: 9/30/2024  Interpreted By:  Sesar Simons  and Nola Hawkins STUDY: XR CHEST 1 VIEW;  9/30/2024 1:41 am   INDICATION: Signs/Symptoms:pre op. Per EMR: History metastatic pancreatic cancer, Crohn's disease with pathological left femoral neck fracture.     COMPARISON: None.   ACCESSION NUMBER(S): [id number]   ORDERING CLINICIAN: JESSICA DICK   FINDINGS: AP radiograph of the chest was provided.   Right chest wall MediPort with distal tip overlying the superior cavoatrial junction.   CARDIOMEDIASTINAL SILHOUETTE: Cardiomediastinal silhouette is normal in size and configuration.   LUNGS: No evidence of focal consolidation, pleural effusion, or pneumothorax.   ABDOMEN: No remarkable upper abdominal findings.   BONES: No acute osseous changes.       1.  No evidence of acute cardiopulmonary process.   I personally reviewed the images/study and I agree with the findings as stated by Shruthi Vaca DO, PGY-3. This study was interpreted at [facility], [place], [place].   MACRO: None   Signed by: Sesar Simons 9/30/2024 8:55 AM Dictation workstation:   [id number]      Inpatient Medications:  Scheduled medications   Medication Dose Route Frequency    cyanocobalamin  1,000 mcg oral Daily    DULoxetine  30 mg oral Daily    [Held by provider] enoxaparin  40 mg subcutaneous Daily    ergocalciferol  1,250 mcg oral Weekly    gabapentin  300 mg oral TID    lipase-protease-amylase  2 capsule oral TID    pantoprazole  40 mg oral Daily before breakfast    polyethylene glycol  17 g oral Daily     PRN medications   Medication    acetaminophen    oxyCODONE     Continuous Medications   Medication Dose Last Rate    lactated Ringer's  75 mL/hr 75 mL/hr (10/01/24 3909)       Outpatient Medications:  Current Outpatient Medications   Medication Instructions    cyanocobalamin (VITAMIN B-12) 1,000 mcg, oral, Daily    dexAMETHasone  (DECADRON) 4 mg, oral, Daily, For 14 days    DULoxetine (CYMBALTA) 30 mg, oral, Daily    ergocalciferol (VITAMIN D-2) 1.25 mg, oral, Weekly    gabapentin (NEURONTIN) 300 mg, oral, 3 times daily    omeprazole (PRILOSEC) 40 mg, oral, Daily before breakfast, Do not crush or chew.    oxyCODONE (ROXICODONE) 10 mg, oral, Every 4 hours PRN    pancrelipase, Lip-Prot-Amyl, (Creon) 12,000-38,000 -60,000 unit capsule 2 capsules, oral, 3 times daily before meals       Physical Exam:   General:  Patient is awake, alert, and oriented.  Patient is in no acute distress.  HEENT:  Pupils equal and conjunctiva are clear.  Normocephalic.  Dry mucosa.    Neck:  No thyromegaly.  No JVD.  Cardiovascular:  Regular rate and rhythm.  Normal S1 and S2. Holosystolic murmur present.  Pulmonary:  Clear to auscultation bilaterally.  Abdomen:  Soft. Non-distended.   Lower Extremities:  2+ pedal pulses. No LE edema.  Neurologic:  Cranial nerves intact.  No focal deficit.   Skin: Skin warm and dry, normal skin turgor.   Psychiatric: Normal affect.       Assessment/Plan   Allyson Armendariz is a 73 y.o. female with a PMH of Chron's disease s/p bowel resection and pancreatic adenocarcinoma with c/f metastasis to the liver s/p most recent chemo treatment in August 2024, who is currently admitted for orthopedic evaluation of complete pathologic fracture of the left femur revealed on CT scans at OSH. Orthopedic surgery is planning for OR intervention ( L hip bekah VS total arthoplasty). Pre-operative workup revealed LVOT obstruction on echo. Cardiology was consulted for preoperative risk stratification.       #LVOT obstruction   -Patient is currently pending OR with ortho surgery for her complete left femur fracture  -Holosystolic murmur auscultated on examination prompting pre-operative echo  -TTE on 09/30/24 revealed:  -Images are fairly suboptimal but there appears to be evidence of left ventricular outflow tract obstruction with a peak gradient of 84 mmHg  with Valsalva.  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  Recommendations:  -Intravascular volume repletion:    -Favor 2 units RBCs for goal of Hbg >10, due to chronic anemia and current Hbg of 8.9   -Could consider additional fluid bolus if not inclined to give blood at this time   -Recommend further fluid resuscitation as needed  -Addition of beta-blockage with metoprolol tartrate up to 50 mg TID depending on HR (look for 60-70 bpm if tolerated) and blood pressure.   -Repeat TTE after at least one dose of metoprolol and intravascular repletion to reassess degree of LVOT obstruction   -Ideally would like to see only a mild obstruction on repeat echo prior to OR  -Additionally would also recommend avoidance of inotropes, in favor of phenylephrine, if treatment for intra-operative hypotension is needed       Code Status:  DNR and No Intubation    Seen and discussed with attending, Dr. Hayes.     Bronwyn Sr MD  Internal Medicine, PGY-1      STAFF ATTESTATION  I saw and evaluated the patient. I personally obtained the key and critical portions of the history and physical exam or was physically present for key and critical portions performed by the resident/fellow. I reviewed the resident/fellow's documentation and discussed the patient with the resident/fellow. I agree with the resident/fellow's medical decision making as documented in the note.    If intracardiac/dynamic gradient is below 50 mmHg on follow-up transthoracic echocardiogram, she will be authorized to get her orthopedic surgery done from the cardiac standpoint. She should continue the max tolerated dose of metoprolol post surgery.      Amrik Hayes MD

## 2024-10-02 ENCOUNTER — APPOINTMENT (OUTPATIENT)
Dept: RADIOLOGY | Facility: HOSPITAL | Age: 74
DRG: 521 | End: 2024-10-02
Payer: MEDICARE

## 2024-10-02 ENCOUNTER — ANESTHESIA (OUTPATIENT)
Dept: OPERATING ROOM | Facility: HOSPITAL | Age: 74
End: 2024-10-02
Payer: MEDICARE

## 2024-10-02 ENCOUNTER — APPOINTMENT (OUTPATIENT)
Dept: CARDIOLOGY | Facility: HOSPITAL | Age: 74
End: 2024-10-02
Payer: MEDICARE

## 2024-10-02 PROBLEM — K21.9 GASTROESOPHAGEAL REFLUX DISEASE: Status: ACTIVE | Noted: 2024-10-02

## 2024-10-02 LAB
ALBUMIN SERPL BCP-MCNC: 2.7 G/DL (ref 3.4–5)
ALBUMIN SERPL BCP-MCNC: 2.7 G/DL (ref 3.4–5)
ANION GAP SERPL CALC-SCNC: 15 MMOL/L (ref 10–20)
ANION GAP SERPL CALC-SCNC: 15 MMOL/L (ref 10–20)
BASOPHILS # BLD AUTO: 0.02 X10*3/UL (ref 0–0.1)
BASOPHILS # BLD AUTO: 0.02 X10*3/UL (ref 0–0.1)
BASOPHILS NFR BLD AUTO: 0.2 %
BASOPHILS NFR BLD AUTO: 0.2 %
BLOOD EXPIRATION DATE: NORMAL
BUN SERPL-MCNC: 39 MG/DL (ref 6–23)
BUN SERPL-MCNC: 39 MG/DL (ref 6–23)
CALCIUM SERPL-MCNC: 8.3 MG/DL (ref 8.6–10.6)
CALCIUM SERPL-MCNC: 8.3 MG/DL (ref 8.6–10.6)
CHLORIDE SERPL-SCNC: 100 MMOL/L (ref 98–107)
CHLORIDE SERPL-SCNC: 100 MMOL/L (ref 98–107)
CO2 SERPL-SCNC: 24 MMOL/L (ref 21–32)
CO2 SERPL-SCNC: 24 MMOL/L (ref 21–32)
CREAT SERPL-MCNC: 1.27 MG/DL (ref 0.5–1.05)
CREAT SERPL-MCNC: 1.27 MG/DL (ref 0.5–1.05)
DISPENSE STATUS: NORMAL
EGFRCR SERPLBLD CKD-EPI 2021: 44 ML/MIN/1.73M*2
EGFRCR SERPLBLD CKD-EPI 2021: 44 ML/MIN/1.73M*2
EJECTION FRACTION APICAL 4 CHAMBER: 84.9
EJECTION FRACTION APICAL 4 CHAMBER: 84.9
EJECTION FRACTION: 75 %
EJECTION FRACTION: 75 %
EOSINOPHIL # BLD AUTO: 0.09 X10*3/UL (ref 0–0.4)
EOSINOPHIL # BLD AUTO: 0.09 X10*3/UL (ref 0–0.4)
EOSINOPHIL NFR BLD AUTO: 0.7 %
EOSINOPHIL NFR BLD AUTO: 0.7 %
ERYTHROCYTE [DISTWIDTH] IN BLOOD BY AUTOMATED COUNT: 16.1 % (ref 11.5–14.5)
ERYTHROCYTE [DISTWIDTH] IN BLOOD BY AUTOMATED COUNT: 16.1 % (ref 11.5–14.5)
GLUCOSE SERPL-MCNC: 99 MG/DL (ref 74–99)
GLUCOSE SERPL-MCNC: 99 MG/DL (ref 74–99)
HCT VFR BLD AUTO: 34.6 % (ref 36–46)
HCT VFR BLD AUTO: 34.6 % (ref 36–46)
HGB BLD-MCNC: 11.3 G/DL (ref 12–16)
HGB BLD-MCNC: 11.3 G/DL (ref 12–16)
IMM GRANULOCYTES # BLD AUTO: 0.11 X10*3/UL (ref 0–0.5)
IMM GRANULOCYTES # BLD AUTO: 0.11 X10*3/UL (ref 0–0.5)
IMM GRANULOCYTES NFR BLD AUTO: 0.8 % (ref 0–0.9)
IMM GRANULOCYTES NFR BLD AUTO: 0.8 % (ref 0–0.9)
LEFT VENTRICLE INTERNAL DIMENSION DIASTOLE: 3.48 CM (ref 3.5–6)
LEFT VENTRICLE INTERNAL DIMENSION DIASTOLE: 3.48 CM (ref 3.5–6)
LYMPHOCYTES # BLD AUTO: 1.03 X10*3/UL (ref 0.8–3)
LYMPHOCYTES # BLD AUTO: 1.03 X10*3/UL (ref 0.8–3)
LYMPHOCYTES NFR BLD AUTO: 7.7 %
LYMPHOCYTES NFR BLD AUTO: 7.7 %
MAGNESIUM SERPL-MCNC: 1.89 MG/DL (ref 1.6–2.4)
MAGNESIUM SERPL-MCNC: 1.89 MG/DL (ref 1.6–2.4)
MCH RBC QN AUTO: 30.6 PG (ref 26–34)
MCH RBC QN AUTO: 30.6 PG (ref 26–34)
MCHC RBC AUTO-ENTMCNC: 32.7 G/DL (ref 32–36)
MCHC RBC AUTO-ENTMCNC: 32.7 G/DL (ref 32–36)
MCV RBC AUTO: 94 FL (ref 80–100)
MCV RBC AUTO: 94 FL (ref 80–100)
MITRAL VALVE E/A RATIO: 0.62
MITRAL VALVE E/A RATIO: 0.62
MONOCYTES # BLD AUTO: 1.36 X10*3/UL (ref 0.05–0.8)
MONOCYTES # BLD AUTO: 1.36 X10*3/UL (ref 0.05–0.8)
MONOCYTES NFR BLD AUTO: 10.2 %
MONOCYTES NFR BLD AUTO: 10.2 %
NEUTROPHILS # BLD AUTO: 10.7 X10*3/UL (ref 1.6–5.5)
NEUTROPHILS # BLD AUTO: 10.7 X10*3/UL (ref 1.6–5.5)
NEUTROPHILS NFR BLD AUTO: 80.4 %
NEUTROPHILS NFR BLD AUTO: 80.4 %
NRBC BLD-RTO: 0 /100 WBCS (ref 0–0)
NRBC BLD-RTO: 0 /100 WBCS (ref 0–0)
PHOSPHATE SERPL-MCNC: 4 MG/DL (ref 2.5–4.9)
PHOSPHATE SERPL-MCNC: 4 MG/DL (ref 2.5–4.9)
PLATELET # BLD AUTO: 259 X10*3/UL (ref 150–450)
PLATELET # BLD AUTO: 259 X10*3/UL (ref 150–450)
POTASSIUM SERPL-SCNC: 4.3 MMOL/L (ref 3.5–5.3)
POTASSIUM SERPL-SCNC: 4.3 MMOL/L (ref 3.5–5.3)
PRODUCT BLOOD TYPE: 1700
PRODUCT BLOOD TYPE: 1700
PRODUCT BLOOD TYPE: 9500
PRODUCT BLOOD TYPE: 9500
PRODUCT CODE: NORMAL
RBC # BLD AUTO: 3.69 X10*6/UL (ref 4–5.2)
RBC # BLD AUTO: 3.69 X10*6/UL (ref 4–5.2)
SODIUM SERPL-SCNC: 135 MMOL/L (ref 136–145)
SODIUM SERPL-SCNC: 135 MMOL/L (ref 136–145)
UNIT ABO: NORMAL
UNIT NUMBER: NORMAL
UNIT RH: NORMAL
UNIT VOLUME: 284
UNIT VOLUME: 284
UNIT VOLUME: 350
UNIT VOLUME: 350
WBC # BLD AUTO: 13.3 X10*3/UL (ref 4.4–11.3)
WBC # BLD AUTO: 13.3 X10*3/UL (ref 4.4–11.3)
XM INTEP: NORMAL

## 2024-10-02 PROCEDURE — 2500000001 HC RX 250 WO HCPCS SELF ADMINISTERED DRUGS (ALT 637 FOR MEDICARE OP)

## 2024-10-02 PROCEDURE — 99233 SBSQ HOSP IP/OBS HIGH 50: CPT

## 2024-10-02 PROCEDURE — 2780000003 HC OR 278 NO HCPCS: Performed by: ORTHOPAEDIC SURGERY

## 2024-10-02 PROCEDURE — A6213 FOAM DRG >16<=48 SQ IN W/BDR: HCPCS | Performed by: ORTHOPAEDIC SURGERY

## 2024-10-02 PROCEDURE — 2500000004 HC RX 250 GENERAL PHARMACY W/ HCPCS (ALT 636 FOR OP/ED)

## 2024-10-02 PROCEDURE — C1776 JOINT DEVICE (IMPLANTABLE): HCPCS | Performed by: ORTHOPAEDIC SURGERY

## 2024-10-02 PROCEDURE — 93325 DOPPLER ECHO COLOR FLOW MAPG: CPT

## 2024-10-02 PROCEDURE — 72170 X-RAY EXAM OF PELVIS: CPT | Performed by: RADIOLOGY

## 2024-10-02 PROCEDURE — 88305 TISSUE EXAM BY PATHOLOGIST: CPT | Mod: TC,SUR,WESLAB | Performed by: ORTHOPAEDIC SURGERY

## 2024-10-02 PROCEDURE — 7100000002 HC RECOVERY ROOM TIME - EACH INCREMENTAL 1 MINUTE: Performed by: ORTHOPAEDIC SURGERY

## 2024-10-02 PROCEDURE — 2500000005 HC RX 250 GENERAL PHARMACY W/O HCPCS

## 2024-10-02 PROCEDURE — 88311 DECALCIFY TISSUE: CPT | Performed by: PATHOLOGY

## 2024-10-02 PROCEDURE — 93321 DOPPLER ECHO F-UP/LMTD STD: CPT | Performed by: INTERNAL MEDICINE

## 2024-10-02 PROCEDURE — 7100000001 HC RECOVERY ROOM TIME - INITIAL BASE CHARGE: Performed by: ORTHOPAEDIC SURGERY

## 2024-10-02 PROCEDURE — C1713 ANCHOR/SCREW BN/BN,TIS/BN: HCPCS | Performed by: ORTHOPAEDIC SURGERY

## 2024-10-02 PROCEDURE — 2500000004 HC RX 250 GENERAL PHARMACY W/ HCPCS (ALT 636 FOR OP/ED): Performed by: ORTHOPAEDIC SURGERY

## 2024-10-02 PROCEDURE — 2500000004 HC RX 250 GENERAL PHARMACY W/ HCPCS (ALT 636 FOR OP/ED): Performed by: STUDENT IN AN ORGANIZED HEALTH CARE EDUCATION/TRAINING PROGRAM

## 2024-10-02 PROCEDURE — 27125 PARTIAL HIP REPLACEMENT: CPT

## 2024-10-02 PROCEDURE — 2720000007 HC OR 272 NO HCPCS: Performed by: ORTHOPAEDIC SURGERY

## 2024-10-02 PROCEDURE — 2500000005 HC RX 250 GENERAL PHARMACY W/O HCPCS: Performed by: STUDENT IN AN ORGANIZED HEALTH CARE EDUCATION/TRAINING PROGRAM

## 2024-10-02 PROCEDURE — 72170 X-RAY EXAM OF PELVIS: CPT

## 2024-10-02 PROCEDURE — 83735 ASSAY OF MAGNESIUM: CPT

## 2024-10-02 PROCEDURE — 1170000001 HC PRIVATE ONCOLOGY ROOM DAILY

## 2024-10-02 PROCEDURE — 80069 RENAL FUNCTION PANEL: CPT

## 2024-10-02 PROCEDURE — 36430 TRANSFUSION BLD/BLD COMPNT: CPT

## 2024-10-02 PROCEDURE — 93308 TTE F-UP OR LMTD: CPT | Performed by: INTERNAL MEDICINE

## 2024-10-02 PROCEDURE — 3700000001 HC GENERAL ANESTHESIA TIME - INITIAL BASE CHARGE: Performed by: ORTHOPAEDIC SURGERY

## 2024-10-02 PROCEDURE — 3600000010 HC OR TIME - EACH INCREMENTAL 1 MINUTE - PROCEDURE LEVEL FIVE: Performed by: ORTHOPAEDIC SURGERY

## 2024-10-02 PROCEDURE — 85025 COMPLETE CBC W/AUTO DIFF WBC: CPT

## 2024-10-02 PROCEDURE — 99223 1ST HOSP IP/OBS HIGH 75: CPT | Performed by: STUDENT IN AN ORGANIZED HEALTH CARE EDUCATION/TRAINING PROGRAM

## 2024-10-02 PROCEDURE — 3700000002 HC GENERAL ANESTHESIA TIME - EACH INCREMENTAL 1 MINUTE: Performed by: ORTHOPAEDIC SURGERY

## 2024-10-02 PROCEDURE — 2500000005 HC RX 250 GENERAL PHARMACY W/O HCPCS: Performed by: ORTHOPAEDIC SURGERY

## 2024-10-02 PROCEDURE — 3600000005 HC OR TIME - INITIAL BASE CHARGE - PROCEDURE LEVEL FIVE: Performed by: ORTHOPAEDIC SURGERY

## 2024-10-02 PROCEDURE — 93325 DOPPLER ECHO COLOR FLOW MAPG: CPT | Performed by: INTERNAL MEDICINE

## 2024-10-02 PROCEDURE — 88305 TISSUE EXAM BY PATHOLOGIST: CPT | Performed by: PATHOLOGY

## 2024-10-02 PROCEDURE — 27365 RESECT FEMUR/KNEE TUMOR: CPT | Performed by: ORTHOPAEDIC SURGERY

## 2024-10-02 PROCEDURE — 0QB70ZZ EXCISION OF LEFT UPPER FEMUR, OPEN APPROACH: ICD-10-PCS | Performed by: ORTHOPAEDIC SURGERY

## 2024-10-02 PROCEDURE — 27125 PARTIAL HIP REPLACEMENT: CPT | Performed by: ORTHOPAEDIC SURGERY

## 2024-10-02 PROCEDURE — 0SRS0J9 REPLACEMENT OF LEFT HIP JOINT, FEMORAL SURFACE WITH SYNTHETIC SUBSTITUTE, CEMENTED, OPEN APPROACH: ICD-10-PCS | Performed by: ORTHOPAEDIC SURGERY

## 2024-10-02 DEVICE — MODULAR CATHCART TAPERED SPACER 12/14 TAPER +5MM: Type: IMPLANTABLE DEVICE | Site: HIP | Status: FUNCTIONAL

## 2024-10-02 DEVICE — MODULAR CATHCART FRACTURE HEAD HIP BALL 44MM OD +5MM: Type: IMPLANTABLE DEVICE | Site: HIP | Status: FUNCTIONAL

## 2024-10-02 DEVICE — CEMENTRALIZER STEM CENTRALIZER 8.5MM CEMENTED
Type: IMPLANTABLE DEVICE | Site: HIP | Status: FUNCTIONAL
Brand: CEMENTRALIZER

## 2024-10-02 DEVICE — TOBRA FULL DOSE ANTIBIOTIC BONE CEMENT, 10 PACK CATALOG NUMBER IS 6197-9-010
Type: IMPLANTABLE DEVICE | Site: HIP | Status: FUNCTIONAL
Brand: SIMPLEX

## 2024-10-02 DEVICE — SUMMIT FEMORAL STEM 12/14 TAPER CEMENTED SIZE 2 STD 97MM
Type: IMPLANTABLE DEVICE | Site: HIP | Status: FUNCTIONAL
Brand: SUMMIT

## 2024-10-02 RX ORDER — TOBRAMYCIN 1.2 G/30ML
INJECTION, POWDER, LYOPHILIZED, FOR SOLUTION INTRAVENOUS AS NEEDED
Status: DISCONTINUED | OUTPATIENT
Start: 2024-10-02 | End: 2024-10-02 | Stop reason: HOSPADM

## 2024-10-02 RX ORDER — VANCOMYCIN HYDROCHLORIDE 1 G/20ML
INJECTION, POWDER, LYOPHILIZED, FOR SOLUTION INTRAVENOUS AS NEEDED
Status: DISCONTINUED | OUTPATIENT
Start: 2024-10-02 | End: 2024-10-02 | Stop reason: HOSPADM

## 2024-10-02 RX ORDER — WATER 1 ML/ML
IRRIGANT IRRIGATION AS NEEDED
Status: DISCONTINUED | OUTPATIENT
Start: 2024-10-02 | End: 2024-10-02 | Stop reason: HOSPADM

## 2024-10-02 RX ORDER — ACETAMINOPHEN 325 MG/1
650 TABLET ORAL EVERY 4 HOURS PRN
Status: DISCONTINUED | OUTPATIENT
Start: 2024-10-02 | End: 2024-10-02 | Stop reason: HOSPADM

## 2024-10-02 RX ORDER — TRANEXAMIC ACID 100 MG/ML
INJECTION, SOLUTION INTRAVENOUS AS NEEDED
Status: DISCONTINUED | OUTPATIENT
Start: 2024-10-02 | End: 2024-10-02

## 2024-10-02 RX ORDER — HYDROMORPHONE HYDROCHLORIDE 1 MG/ML
INJECTION, SOLUTION INTRAMUSCULAR; INTRAVENOUS; SUBCUTANEOUS AS NEEDED
Status: DISCONTINUED | OUTPATIENT
Start: 2024-10-02 | End: 2024-10-02

## 2024-10-02 RX ORDER — ALBUTEROL SULFATE 0.83 MG/ML
2.5 SOLUTION RESPIRATORY (INHALATION) ONCE AS NEEDED
Status: DISCONTINUED | OUTPATIENT
Start: 2024-10-02 | End: 2024-10-02 | Stop reason: HOSPADM

## 2024-10-02 RX ORDER — DROPERIDOL 2.5 MG/ML
0.62 INJECTION, SOLUTION INTRAMUSCULAR; INTRAVENOUS ONCE AS NEEDED
Status: DISCONTINUED | OUTPATIENT
Start: 2024-10-02 | End: 2024-10-02 | Stop reason: HOSPADM

## 2024-10-02 RX ORDER — CEFAZOLIN SODIUM 2 G/100ML
2 INJECTION, SOLUTION INTRAVENOUS EVERY 12 HOURS
Status: COMPLETED | OUTPATIENT
Start: 2024-10-02 | End: 2024-10-04

## 2024-10-02 RX ORDER — ONDANSETRON HYDROCHLORIDE 2 MG/ML
INJECTION, SOLUTION INTRAVENOUS AS NEEDED
Status: DISCONTINUED | OUTPATIENT
Start: 2024-10-02 | End: 2024-10-02

## 2024-10-02 RX ORDER — CEFAZOLIN 1 G/1
INJECTION, POWDER, FOR SOLUTION INTRAVENOUS AS NEEDED
Status: DISCONTINUED | OUTPATIENT
Start: 2024-10-02 | End: 2024-10-02

## 2024-10-02 RX ORDER — LIDOCAINE HYDROCHLORIDE 10 MG/ML
0.1 INJECTION, SOLUTION INFILTRATION; PERINEURAL ONCE
Status: DISCONTINUED | OUTPATIENT
Start: 2024-10-02 | End: 2024-10-02 | Stop reason: HOSPADM

## 2024-10-02 RX ORDER — DEXMEDETOMIDINE IN 0.9 % NACL 20 MCG/5ML
SYRINGE (ML) INTRAVENOUS AS NEEDED
Status: DISCONTINUED | OUTPATIENT
Start: 2024-10-02 | End: 2024-10-02

## 2024-10-02 RX ORDER — FENTANYL CITRATE 50 UG/ML
INJECTION, SOLUTION INTRAMUSCULAR; INTRAVENOUS AS NEEDED
Status: DISCONTINUED | OUTPATIENT
Start: 2024-10-02 | End: 2024-10-02

## 2024-10-02 RX ORDER — LIDOCAINE HYDROCHLORIDE 20 MG/ML
INJECTION, SOLUTION INFILTRATION; PERINEURAL AS NEEDED
Status: DISCONTINUED | OUTPATIENT
Start: 2024-10-02 | End: 2024-10-02

## 2024-10-02 RX ORDER — LABETALOL HYDROCHLORIDE 5 MG/ML
5 INJECTION, SOLUTION INTRAVENOUS ONCE AS NEEDED
Status: DISCONTINUED | OUTPATIENT
Start: 2024-10-02 | End: 2024-10-02 | Stop reason: HOSPADM

## 2024-10-02 RX ORDER — PHENYLEPHRINE HCL IN 0.9% NACL 0.4MG/10ML
SYRINGE (ML) INTRAVENOUS AS NEEDED
Status: DISCONTINUED | OUTPATIENT
Start: 2024-10-02 | End: 2024-10-02

## 2024-10-02 RX ORDER — HYDROMORPHONE HYDROCHLORIDE 1 MG/ML
0.2 INJECTION, SOLUTION INTRAMUSCULAR; INTRAVENOUS; SUBCUTANEOUS EVERY 5 MIN PRN
Status: DISCONTINUED | OUTPATIENT
Start: 2024-10-02 | End: 2024-10-02 | Stop reason: HOSPADM

## 2024-10-02 RX ORDER — ROPIVACAINE HYDROCHLORIDE 5 MG/ML
INJECTION, SOLUTION EPIDURAL; INFILTRATION; PERINEURAL AS NEEDED
Status: DISCONTINUED | OUTPATIENT
Start: 2024-10-02 | End: 2024-10-02

## 2024-10-02 RX ORDER — SODIUM CHLORIDE, SODIUM LACTATE, POTASSIUM CHLORIDE, CALCIUM CHLORIDE 600; 310; 30; 20 MG/100ML; MG/100ML; MG/100ML; MG/100ML
100 INJECTION, SOLUTION INTRAVENOUS CONTINUOUS
Status: DISCONTINUED | OUTPATIENT
Start: 2024-10-02 | End: 2024-10-02 | Stop reason: HOSPADM

## 2024-10-02 RX ORDER — ROCURONIUM BROMIDE 10 MG/ML
INJECTION, SOLUTION INTRAVENOUS AS NEEDED
Status: DISCONTINUED | OUTPATIENT
Start: 2024-10-02 | End: 2024-10-02

## 2024-10-02 RX ORDER — OXYCODONE HYDROCHLORIDE 5 MG/1
5 TABLET ORAL EVERY 4 HOURS PRN
Status: DISCONTINUED | OUTPATIENT
Start: 2024-10-02 | End: 2024-10-02 | Stop reason: HOSPADM

## 2024-10-02 RX ORDER — CEFAZOLIN SODIUM 2 G/100ML
2 INJECTION, SOLUTION INTRAVENOUS EVERY 8 HOURS
Status: DISCONTINUED | OUTPATIENT
Start: 2024-10-02 | End: 2024-10-02

## 2024-10-02 RX ORDER — SODIUM CHLORIDE 0.9 G/100ML
IRRIGANT IRRIGATION AS NEEDED
Status: DISCONTINUED | OUTPATIENT
Start: 2024-10-02 | End: 2024-10-02 | Stop reason: HOSPADM

## 2024-10-02 RX ORDER — ONDANSETRON HYDROCHLORIDE 2 MG/ML
4 INJECTION, SOLUTION INTRAVENOUS ONCE AS NEEDED
Status: DISCONTINUED | OUTPATIENT
Start: 2024-10-02 | End: 2024-10-02 | Stop reason: HOSPADM

## 2024-10-02 RX ORDER — OXYCODONE HYDROCHLORIDE 5 MG/1
10 TABLET ORAL EVERY 4 HOURS PRN
Status: DISCONTINUED | OUTPATIENT
Start: 2024-10-02 | End: 2024-10-02 | Stop reason: HOSPADM

## 2024-10-02 RX ORDER — PROPOFOL 10 MG/ML
INJECTION, EMULSION INTRAVENOUS AS NEEDED
Status: DISCONTINUED | OUTPATIENT
Start: 2024-10-02 | End: 2024-10-02

## 2024-10-02 RX ORDER — HYDROMORPHONE HYDROCHLORIDE 1 MG/ML
0.5 INJECTION, SOLUTION INTRAMUSCULAR; INTRAVENOUS; SUBCUTANEOUS EVERY 5 MIN PRN
Status: DISCONTINUED | OUTPATIENT
Start: 2024-10-02 | End: 2024-10-02 | Stop reason: HOSPADM

## 2024-10-02 RX ADMIN — DULOXETINE HYDROCHLORIDE 30 MG: 30 CAPSULE, DELAYED RELEASE ORAL at 10:06

## 2024-10-02 RX ADMIN — GABAPENTIN 300 MG: 300 CAPSULE ORAL at 20:48

## 2024-10-02 RX ADMIN — PERFLUTREN 2 ML OF DILUTION: 6.52 INJECTION, SUSPENSION INTRAVENOUS at 09:31

## 2024-10-02 RX ADMIN — METOPROLOL TARTRATE 50 MG: 50 TABLET, FILM COATED ORAL at 10:06

## 2024-10-02 RX ADMIN — HYDROMORPHONE HYDROCHLORIDE 0.2 MG: 1 INJECTION, SOLUTION INTRAMUSCULAR; INTRAVENOUS; SUBCUTANEOUS at 15:30

## 2024-10-02 RX ADMIN — Medication 5 L/MIN: at 15:10

## 2024-10-02 RX ADMIN — PANTOPRAZOLE SODIUM 40 MG: 40 TABLET, DELAYED RELEASE ORAL at 07:47

## 2024-10-02 RX ADMIN — GABAPENTIN 300 MG: 300 CAPSULE ORAL at 10:07

## 2024-10-02 RX ADMIN — ACETAMINOPHEN 975 MG: 325 TABLET ORAL at 20:56

## 2024-10-02 RX ADMIN — HYDROMORPHONE HYDROCHLORIDE 0.2 MG: 1 INJECTION, SOLUTION INTRAMUSCULAR; INTRAVENOUS; SUBCUTANEOUS at 15:25

## 2024-10-02 RX ADMIN — CYANOCOBALAMIN TAB 1000 MCG 1000 MCG: 1000 TAB at 10:07

## 2024-10-02 RX ADMIN — OXYCODONE HYDROCHLORIDE 10 MG: 5 TABLET ORAL at 07:47

## 2024-10-02 ASSESSMENT — COGNITIVE AND FUNCTIONAL STATUS - GENERAL
WALKING IN HOSPITAL ROOM: A LOT
MOVING TO AND FROM BED TO CHAIR: A LOT
DAILY ACTIVITIY SCORE: 22
CLIMB 3 TO 5 STEPS WITH RAILING: TOTAL
TURNING FROM BACK TO SIDE WHILE IN FLAT BAD: A LITTLE
DRESSING REGULAR LOWER BODY CLOTHING: A LOT
STANDING UP FROM CHAIR USING ARMS: A LOT
TOILETING: A LOT
DRESSING REGULAR UPPER BODY CLOTHING: A LOT
TURNING FROM BACK TO SIDE WHILE IN FLAT BAD: A LITTLE
DAILY ACTIVITIY SCORE: 16
DRESSING REGULAR LOWER BODY CLOTHING: A LOT
MOBILITY SCORE: 14
MOBILITY SCORE: 13
CLIMB 3 TO 5 STEPS WITH RAILING: TOTAL
MOVING FROM LYING ON BACK TO SITTING ON SIDE OF FLAT BED WITH BEDRAILS: A LITTLE
WALKING IN HOSPITAL ROOM: A LOT
HELP NEEDED FOR BATHING: A LOT
MOVING TO AND FROM BED TO CHAIR: A LOT
STANDING UP FROM CHAIR USING ARMS: A LOT

## 2024-10-02 ASSESSMENT — PAIN DESCRIPTION - DESCRIPTORS
DESCRIPTORS: PATIENT UNABLE TO DESCRIBE

## 2024-10-02 ASSESSMENT — PAIN SCALES - GENERAL
PAINLEVEL_OUTOF10: 5 - MODERATE PAIN
PAINLEVEL_OUTOF10: 3
PAINLEVEL_OUTOF10: 0 - NO PAIN
PAINLEVEL_OUTOF10: 5 - MODERATE PAIN
PAINLEVEL_OUTOF10: 4
PAINLEVEL_OUTOF10: 5 - MODERATE PAIN
PAINLEVEL_OUTOF10: 1
PAINLEVEL_OUTOF10: 0 - NO PAIN
PAINLEVEL_OUTOF10: 0 - NO PAIN
PAINLEVEL_OUTOF10: 7

## 2024-10-02 ASSESSMENT — PAIN - FUNCTIONAL ASSESSMENT
PAIN_FUNCTIONAL_ASSESSMENT: 0-10
PAIN_FUNCTIONAL_ASSESSMENT: UNABLE TO SELF-REPORT
PAIN_FUNCTIONAL_ASSESSMENT: 0-10

## 2024-10-02 ASSESSMENT — PAIN DESCRIPTION - LOCATION: LOCATION: BACK

## 2024-10-02 ASSESSMENT — COLUMBIA-SUICIDE SEVERITY RATING SCALE - C-SSRS
2. HAVE YOU ACTUALLY HAD ANY THOUGHTS OF KILLING YOURSELF?: NO
6. HAVE YOU EVER DONE ANYTHING, STARTED TO DO ANYTHING, OR PREPARED TO DO ANYTHING TO END YOUR LIFE?: NO
1. IN THE PAST MONTH, HAVE YOU WISHED YOU WERE DEAD OR WISHED YOU COULD GO TO SLEEP AND NOT WAKE UP?: NO

## 2024-10-02 ASSESSMENT — PAIN SCALES - PAIN ASSESSMENT IN ADVANCED DEMENTIA (PAINAD): TOTALSCORE: MEDICATION (SEE MAR)

## 2024-10-02 NOTE — PROGRESS NOTES
Physical Therapy                 Therapy Communication Note    Patient Name: Allyson Armendariz  MRN: [id number]  Department: [facility]  Room: [facility]  Today's Date: 10/2/2024     Discipline: Physical Therapy    Missed Visit Reason: Missed Visit Reason: Patient in a medical procedure (Pt in OR.  PT will re-attempt as schedule allows and pt is medically appropriate)    Missed Time: Attempt 1212

## 2024-10-02 NOTE — PROGRESS NOTES
"Orthopaedic Surgery Progress Note    Subjective:    Patient tolerated procedure well without complications. Evaluated in PACU in the immediate postoperative period.     Objective:  /66   Pulse 60   Temp 36.2 °C (97.2 °F)   Resp 12   Ht 1.6 m (5' 3\")   Wt 57.3 kg (126 lb 5.2 oz)   SpO2 100%   BMI 22.38 kg/m²     Gen: arousable, NAD, appropriately conversational  Cardiac: RRR to peripheral palpation  Resp: nonlabored on RA  GI: soft, non-distended  MSK:  LLE:   - postoperative mepilex x1 in place without strike through   - appropriately warm and tender postoperative extremity  - wiggles toes  - SILT distally  - unable to participate in full neurovascular exam d/t sensation   - Foot wwp, 2+ DP/PT pulse, brisk cap refill  - Compartments soft and compressible, no pain with passive dorsiflexion      Results for orders placed or performed during the hospital encounter of 09/29/24 (from the past 24 hour(s))   Magnesium   Result Value Ref Range    Magnesium 1.89 1.60 - 2.40 mg/dL   Renal Function Panel   Result Value Ref Range    Glucose 99 74 - 99 mg/dL    Sodium 135 (L) 136 - 145 mmol/L    Potassium 4.3 3.5 - 5.3 mmol/L    Chloride 100 98 - 107 mmol/L    Bicarbonate 24 21 - 32 mmol/L    Anion Gap 15 10 - 20 mmol/L    Urea Nitrogen 39 (H) 6 - 23 mg/dL    Creatinine 1.27 (H) 0.50 - 1.05 mg/dL    eGFR 44 (L) >60 mL/min/1.73m*2    Calcium 8.3 (L) 8.6 - 10.6 mg/dL    Phosphorus 4.0 2.5 - 4.9 mg/dL    Albumin 2.7 (L) 3.4 - 5.0 g/dL   CBC and Auto Differential   Result Value Ref Range    WBC 13.3 (H) 4.4 - 11.3 x10*3/uL    nRBC 0.0 0.0 - 0.0 /100 WBCs    RBC 3.69 (L) 4.00 - 5.20 x10*6/uL    Hemoglobin 11.3 (L) 12.0 - 16.0 g/dL    Hematocrit 34.6 (L) 36.0 - 46.0 %    MCV 94 80 - 100 fL    MCH 30.6 26.0 - 34.0 pg    MCHC 32.7 32.0 - 36.0 g/dL    RDW 16.1 (H) 11.5 - 14.5 %    Platelets 259 150 - 450 x10*3/uL    Neutrophils % 80.4 40.0 - 80.0 %    Immature Granulocytes %, Automated 0.8 0.0 - 0.9 %    Lymphocytes % 7.7 " 13.0 - 44.0 %    Monocytes % 10.2 2.0 - 10.0 %    Eosinophils % 0.7 0.0 - 6.0 %    Basophils % 0.2 0.0 - 2.0 %    Neutrophils Absolute 10.70 (H) 1.60 - 5.50 x10*3/uL    Immature Granulocytes Absolute, Automated 0.11 0.00 - 0.50 x10*3/uL    Lymphocytes Absolute 1.03 0.80 - 3.00 x10*3/uL    Monocytes Absolute 1.36 (H) 0.05 - 0.80 x10*3/uL    Eosinophils Absolute 0.09 0.00 - 0.40 x10*3/uL    Basophils Absolute 0.02 0.00 - 0.10 x10*3/uL   Transthoracic Echo (TTE) Limited   Result Value Ref Range    MV E/A ratio 0.62     LV EF 75 %    LVIDd 3.48 cm    LV A4C EF 84.9        FL fluoro images no charge   Final Result      Transthoracic Echo (TTE) Limited   Final Result      Vascular US Lower Extremity Venous Duplex Bilateral   Final Result   No sonographic evidence for deep vein thrombosis within the evaluated   veins of the bilateral lower extremities.        I personally reviewed the images/study and I agree with the findings   as stated by Ely Rowe MD. This study was interpreted at   [place], [place].        MACRO:   None        Signed by: Arturo Arana 10/1/2024 5:46 PM   Dictation workstation:   [id number]      Transthoracic Echo (TTE) Complete   Final Result      XR hip left with pelvis when performed 2 or 3 views   Final Result   Left subcapital femoral fracture with mild impaction and   displacement. Lucency in the intertrochanteric region of the left   femur concerning for underlying osseous lesion.        I personally reviewed the images/study and I agree with the findings   as stated by Shruthi Vaca DO, PGY-2. This study was interpreted   at [place], [place].        MACRO:   None             Signed by: Pedro Leslie 9/30/2024 9:54 AM   Dictation workstation:   [id number]      XR chest 1 view   Final Result   1.  No evidence of acute cardiopulmonary process.        I personally reviewed the  images/study and I agree with the findings   as stated by Shruthi Vaca DO, PGY-3. This study was interpreted   at [place], [place].        MACRO:   None        Signed by: Sesar Simons 9/30/2024 8:55 AM   Dictation workstation:   [id number]      XR femur left 2+ views   Final Result   Left subcapital femoral fracture with mild impaction and   displacement. Lucency in the intertrochanteric region of the left   femur concerning for underlying osseous lesion.        I personally reviewed the images/study and I agree with the findings   as stated by Shruthi Vaca DO, PGY-2. This study was interpreted   at [place], [place].        MACRO:   None             Signed by: Pedro Leslie 9/30/2024 9:54 AM   Dictation workstation:   [id number]      FL less than 1 hour    (Results Pending)   XR pelvis 1-2 views    (Results Pending)       Assessment/Plan: 73 y.o. female w/ pathologic L FNF s/p L Hemiarthroplasty on 10/2 with Dr. Alcala.      Plan:  - Postoperative AP pelvis in PACU w/ well reduced implant   - Weight bearing: WBAT LLE no restrictions   - DVT ppx: SCDs, okay for DVT ppx POD1, will defer to primary  - Diet: OK for diet from an Orthopaedic perspective   - Pain: multimodal pain control per primary   - Antibiotics: perioperative ancef 2g q8hr x3 doses  - Lines: maintain PIVx2 while inpatient  - Bowel Regimen: miralax  - PT/OT: consulted  - Pulm: Encourage IS, maintain O2 sat >92%  - Mata: none   - Drains: none     Dispo: Pending Postop PT/OT    Ariel Dsouza MD, MD  Orthopedic Surgery PGY-2  [facility]  Available by Epic Chat    While inpatient, this patient will be followed by the Orthopaedic Trauma team. Please see contact information below:    Ortho Trauma  Ariel Dsouza MD PGY2  Cassandra Trujillo MD PGY3    Please page [phone] (ortho on-call) after 6pm and on weekends.

## 2024-10-02 NOTE — PROGRESS NOTES
Occupational Therapy                 Therapy Communication Note    Patient Name: Allyson Armendariz  MRN: [id number]  Department:   Room: 60/6011-A  Today's Date: 10/2/2024     Discipline: Occupational Therapy    Missed Visit Reason: Missed Visit Reason: Patient in a medical procedure    Missed Time: Attempt At ECHO, will re-attempt as schedule permits.    Comment:

## 2024-10-02 NOTE — ANESTHESIA PROCEDURE NOTES
Airway  Date/Time: 10/2/2024 12:40 PM  Urgency: elective    Airway not difficult    Staffing  Performed: PATRICIA   Authorized by: Joyce Saldivar MD    Performed by: PATRICIA Goel  Patient location during procedure: OR    Indications and Patient Condition  Indications for airway management: anesthesia  Spontaneous ventilation: present  Sedation level: deep  Preoxygenated: no  Patient position: sniffing  MILS maintained throughout  Mask difficulty assessment: 1 - vent by mask    Final Airway Details  Final airway type: endotracheal airway      Successful airway: ETT  Cuffed: yes   Successful intubation technique: direct laryngoscopy  Facilitating devices/methods: intubating stylet  Endotracheal tube insertion site: oral  Blade: Dilip  Blade size: #3  ETT size (mm): 7.0  Cormack-Lehane Classification: grade I - full view of glottis  Placement verified by: chest auscultation and capnometry   Measured from: gums  ETT to gums (cm): 22  Number of attempts at approach: 1    Additional Comments  Atraumatic intubation, dentition in tact

## 2024-10-02 NOTE — H&P
[facility] Department of Orthopaedic Surgery   Surgical History & Physical <30 Days  10/02/24    Reason for Surgery: Left femoral neck fracture  Planned Procedure: L hemiarthroplasty vs JARETT    History & Physical Reviewed:  I have reviewed the History and Physical for obtained within the last 30 days. Relevant findings and updates are noted below:  No significant changes.    Home medications were reviewed with significant updates noted below:  No significant changes.    ERAS patient?: No    COVID-19 Risk Consent:   Surgeon has reviewed the key risks related to delvin COVID-19 and subsequent sequelae.     10/02/24 at 12:01 AM - Gallito Negron MD

## 2024-10-02 NOTE — ANESTHESIA PREPROCEDURE EVALUATION
Patient: Allyson Armendariz    Procedure Information       Date/Time: 10/02/24 1030    Procedure: Hip Hemiarthroplasty (Left: Hip)    Location: [facility] OR 07 / [facility]    Surgeons: Jimmy Alcala MD          73F with PMH Crohn's, pancreatic cancer with mets to liver s/p chemo, GERD presenting for left JARETT vs. Hemiarthroplasty with Dr. Alcala. Cardiology consulted while inpatient for LVOT obstruction (peak gradient 84mmHg with Valsalva) noted on preop echo with recommendation for transfusion and repeat echo prior to surgery today. Repeat echo shows mild LVOT obstruction with gradient <50mmHg - ok to proceed per cardiology.     Relevant Problems   GI   (+) Crohn's disease (Multi)   (+) Gastroesophageal reflux disease      Liver   (+) Pancreatic cancer (Multi)      Musculoskeletal   (+) Left displaced femoral neck fracture      GYN   (+) History of hysterectomy       Clinical information reviewed:   Tobacco  Allergies  Meds   Med Hx  Surg Hx   Fam Hx  Soc Hx        NPO Detail:  NPO/Void Status  Carbohydrate Drink Given Prior to Surgery? : N  Date of Last Liquid: 10/02/24  Time of Last Liquid: 0000  Date of Last Solid: 10/02/24  Time of Last Solid: 0000  Last Intake Type: Clear fluids  Time of Last Void: 0900         Physical Exam    Airway  Mallampati: III  TM distance: >3 FB  Neck ROM: full     Cardiovascular   Rhythm: regular  Rate: normal     Dental - normal exam     Pulmonary   Breath sounds clear to auscultation     Abdominal            Anesthesia Plan    History of general anesthesia?: yes  History of complications of general anesthesia?: no    ASA 3     general   (I discussed patient's DNR/DNI status with the patient and ortho team. Patient agrees to temporarily reversing DNR/DNI status and being full code for the procedure. Patient understands that this includes but is not limited to intubation, resuscitation and all indicated procedures. All questions answered.     Plan for preop nerve  block.)  intravenous induction   Postoperative administration of opioids is intended.  Anesthetic plan and risks discussed with patient.  Use of blood products discussed with patient who consented to blood products.    Plan discussed with CAA and attending.

## 2024-10-02 NOTE — ANESTHESIA PROCEDURE NOTES
Peripheral Block    Patient location during procedure: pre-op  Start time: 10/2/2024 11:12 AM  End time: 10/2/2024 11:20 AM  Reason for block: at surgeon's request and post-op pain management  Staffing  Performed: resident   Authorized by: Nicholas Garrett MD    Performed by: Nicholas Garrett MD  Preanesthetic Checklist  Completed: patient identified, IV checked, site marked, risks and benefits discussed, surgical consent, monitors and equipment checked, pre-op evaluation and timeout performed   Timeout performed at: 10/2/2024 11:11 AM  Peripheral Block  Patient position: laying flat  Prep: ChloraPrep  Patient monitoring: heart rate and continuous pulse ox  Block type: QL  Laterality: left  Injection technique: single-shot  Guidance: ultrasound guided  Local infiltration: ropivacaine  Needle  Needle type: Tuohy   Needle gauge: 26 G  Needle length: 8 cm  Needle localization: ultrasound guidance     image stored in chart  Assessment  Injection assessment: negative aspiration for heme, no paresthesia on injection, incremental injection and local visualized surrounding nerve on ultrasound  Additional Notes  QL single shot. informed consent obtained. risks and benefits discussed. ASA monitors placed, timeout performed. Pt positioned, prepped with chlorhexidine, draped with sterile towels.     Ultrasound guidance used with visualization of the needle throughout duration of the procedure. Aspiration was negative. A total of Type of Local: 12 mcg precedex, 20 cc of 0.5% ropivacaine, dexamethasone 4mg, and 1:200,000 epinephrine, was divided and injected unilaterally. Patient tolerated procedure well.     Timeout by Grady SÁNCHEZ

## 2024-10-02 NOTE — PROGRESS NOTES
Allyson Armendariz is a 73 y.o. female on day 3 of admission presenting with Pancreatic cancer (Multi).    Subjective   No acute events overnight. Patient at echo this AM and then getting surgery with ortho this afternoon. Saw her this evening post surgery. She is drowsy but had no acute complaints at that time.    Objective   Last Recorded Vitals  /77 (BP Location: Left arm, Patient Position: Sitting)   Pulse 63   Temp 36.6 °C (97.9 °F) (Temporal)   Resp 18   Wt 57.3 kg (126 lb 6.4 oz)   SpO2 97%   Intake/Output last 3 Shifts:    Intake/Output Summary (Last 24 hours) at 10/2/2024 0825  Last data filed at 10/2/2024 0255  Gross per 24 hour   Intake 634 ml   Output --   Net 634 ml   Admission Weight  Weight: 57.3 kg (126 lb 6.4 oz) (09/29/24 2156)  Daily Weight  09/29/24 : 57.3 kg (126 lb 6.4 oz)    Image Results  Vascular US Lower Extremity Venous Duplex Bilateral  Narrative: Interpreted By:  Arturo Arana,  and Soledad Graves   STUDY:  [facility] US LOWER EXTREMITY VENOUS DUPLEX BILATERAL;  10/1/2024 5:15 pm      INDICATION:  Signs/Symptoms:r/o DVT.      COMPARISON:  None.      ACCESSION NUMBER(S):  [id number]      ORDERING CLINICIAN:  JESSICA DICK      TECHNIQUE:  Vascular ultrasound of the bilateral lower extremities was performed.  Real-time compression views as well as Gray scale, color Doppler and  spectral Doppler waveform analysis was performed.      FINDINGS:  LIMITATIONS: None.      RIGHT LOWER EXTREMITY:  Evaluation of the visualized portions of the right common femoral  vein, proximal, mid, and distal femoral vein, and popliteal vein were  performed.  Evaluation of the visualized portions of the posterior  tibial and peroneal veins were also performed.      The evaluated veins demonstrate normal compressibility. There is  intact venous flow demonstrating normal respiratory variability and  normal augmentation of flow with calf compression. Therefore, there  is no ultrasonographic  evidence for deep vein thrombosis within the  evaluated veins. Respiratory variation and augmentation to calf  pressure was noted.      Cystic structure in the right popliteal fossa most consistent with a  Baker's cyst measuring 2.8 x 1.8 x 3.1 cm.          LEFT LOWER EXTREMITY:  Evaluation of the visualized portions of the left common femoral  vein, proximal, mid, and distal femoral vein, and popliteal vein were  performed.  Evaluation of the visualized portions of the posterior  tibial and peroneal veins were also performed.      The evaluated veins demonstrate normal compressibility. There is  intact venous flow demonstrating normal respiratory variability and  normal augmentation of flow with calf compression. Therefore, there  is no ultrasonographic evidence for deep vein thrombosis within the  evaluated veins. Respiratory variation and augmentation to calf  pressure was noted.      Impression: No sonographic evidence for deep vein thrombosis within the evaluated  veins of the bilateral lower extremities.      I personally reviewed the images/study and I agree with the findings  as stated by Ely Rowe MD. This study was interpreted at  [place], [place].      MACRO:  None      Signed by: Arturo Arana 10/1/2024 5:46 PM  Dictation workstation:   [id number]    Physical Exam:  General: drowsy, no acute distress, resting comfortably in bed  HEENT: normocephalic, atraumatic  Cardio: regular rate and rhythm, normal S1 and S2, no murmurs  Pulm: clear to auscultation bilaterally, no wheezes, crackles, or rhonchi, breathing comfortably on room air  Abd: normal, active bowel sounds; soft, non-tender, non-distended  Extremities: no peripheral edema, scars, or lesions  Neuro: unable to assess  Psych: unable to assess    Labs:  Results for orders placed or performed during the hospital encounter of 09/29/24 (from the past 24 hour(s))   Magnesium   Result Value  Ref Range    Magnesium 1.89 1.60 - 2.40 mg/dL   Renal Function Panel   Result Value Ref Range    Glucose 99 74 - 99 mg/dL    Sodium 135 (L) 136 - 145 mmol/L    Potassium 4.3 3.5 - 5.3 mmol/L    Chloride 100 98 - 107 mmol/L    Bicarbonate 24 21 - 32 mmol/L    Anion Gap 15 10 - 20 mmol/L    Urea Nitrogen 39 (H) 6 - 23 mg/dL    Creatinine 1.27 (H) 0.50 - 1.05 mg/dL    eGFR 44 (L) >60 mL/min/1.73m*2    Calcium 8.3 (L) 8.6 - 10.6 mg/dL    Phosphorus 4.0 2.5 - 4.9 mg/dL    Albumin 2.7 (L) 3.4 - 5.0 g/dL   CBC and Auto Differential   Result Value Ref Range    WBC 13.3 (H) 4.4 - 11.3 x10*3/uL    nRBC 0.0 0.0 - 0.0 /100 WBCs    RBC 3.69 (L) 4.00 - 5.20 x10*6/uL    Hemoglobin 11.3 (L) 12.0 - 16.0 g/dL    Hematocrit 34.6 (L) 36.0 - 46.0 %    MCV 94 80 - 100 fL    MCH 30.6 26.0 - 34.0 pg    MCHC 32.7 32.0 - 36.0 g/dL    RDW 16.1 (H) 11.5 - 14.5 %    Platelets 259 150 - 450 x10*3/uL    Neutrophils % 80.4 40.0 - 80.0 %    Immature Granulocytes %, Automated 0.8 0.0 - 0.9 %    Lymphocytes % 7.7 13.0 - 44.0 %    Monocytes % 10.2 2.0 - 10.0 %    Eosinophils % 0.7 0.0 - 6.0 %    Basophils % 0.2 0.0 - 2.0 %    Neutrophils Absolute 10.70 (H) 1.60 - 5.50 x10*3/uL    Immature Granulocytes Absolute, Automated 0.11 0.00 - 0.50 x10*3/uL    Lymphocytes Absolute 1.03 0.80 - 3.00 x10*3/uL    Monocytes Absolute 1.36 (H) 0.05 - 0.80 x10*3/uL    Eosinophils Absolute 0.09 0.00 - 0.40 x10*3/uL    Basophils Absolute 0.02 0.00 - 0.10 x10*3/uL   Transthoracic Echo (TTE) Limited   Result Value Ref Range    MV E/A ratio 0.62     LV EF 75 %    LVIDd 3.48 cm    LV A4C EF 84.9        Assessment/Plan:  Allyson Armendariz is a 74 yo F w PMH of metastatic pancreatic adenocarcinoma (with mets to liver) and Crohn's disease presenting as a transfer from [facility] for further evaluation with orthopedic surgery after being found to have L complete pathologic femur fracture. Patient is planned for surgery with ortho tomorrow. Patient found to have systolic murmur on cardiac  exam which is being further evaluated prior to surgery. Echo showed LV outflow tract obstruction, cardiology consulted for recs. Cardiology cleared after repeat limited echo showed improvement in LV outflow tract obstruction following blood transfusion.     Updates 10/02  - s/p surgery with ortho today  - started ancef 2 g q8h x 3 doses per ortho recs for prophylaxis  - will encourage incentive spirometry  - wound care: mepilex for 7 days, after 7 days, will leave to open air  - will initiate lovenox on POD1     #Complete pathologic fracture of L femur  Patient had significant pain in L leg for past several months with negative imaging. Found to have pathologic fracture of L femur on CT scans at [facility] (scans can now be found in our system).   PLAN:  - s/p surgery with ortho today  - started ancef 2 g q8h x 3 doses per ortho recs for prophylaxis  - will encourage incentive spirometry  - wound care: mepilex for 7 days, after 7 days, will leave to open air  - will initiate lovenox on POD1     #Holosystolic Murmur  Patient found to have holosystolic murmur on exam. Patient does not have known history of valvular dysfunction.  PLAN:  - cardiology recommended transfusion of 2 units of pRBCs for Hgb > 10 and also recommended starting metoprolol tartrate 50 TID, will get repeat echo following transfusion     #Metastatic Pancreatic Adenocarcinoma  Patient has metastatic pancreatic adenocarcinoma w mets to the liver. Follows with Dr. Hightower at [facility]. S/p gem/abraxane, which was stopped August 2024. Plan was to biopsy liver lesions with EUS to guide further treatment, but patient's labs are not concerning for obstruction.  PLAN:  - will reach out to Dr. Hightower to inform about admission  - continue vitamin B and D and creon 91756 TID  - pain regimen: duloxetine 30 mg daily, gabapentin 300 mg TID, acetaminophen 975 mg q6h PRN mild pain, oxycodone 10 mg q4h PRN severe pain  - will consider supportive oncology consult if patient has  worsening pain      This patient has a central line   Reason for the central line remaining today? Parenteral medication    F: PRN  E: K > 4, Mg > 2  N: regular diet, NPO at MN  A: mediport  GI: pantoprazole, miralax  DVT: lovenox (holding at MN)  Abx: ancef (post surgery prophylaxis)  Dispo: pending PT/OT  Pain: duloxetine 30 mg daily, gabapentin 300 mg TID, acetaminophen 975 mg q6h PRN mild pain, oxycodone 10 mg q4h PRN severe pain     Code: DNR/DNI (confirmed on admission)  NOK: Oriana (, [phone]), Shameka (daughter, [phone])     Patient seen and discussed with attending, Dr. Rahel Maldonado MD  Internal Medicine PGY1  [facility] [phone]

## 2024-10-02 NOTE — CARE PLAN
Problem: Fall/Injury  Goal: Not fall by end of shift  Outcome: Progressing  Goal: Be free from injury by end of the shift  Outcome: Progressing  Goal: Verbalize understanding of personal risk factors for fall in the hospital  Outcome: Progressing  Goal: Verbalize understanding of risk factor reduction measures to prevent injury from fall in the home  Outcome: Progressing  Goal: Use assistive devices by end of the shift  Outcome: Progressing  Goal: Pace activities to prevent fatigue by end of the shift  Outcome: Progressing     Problem: Pain - Adult  Goal: Verbalizes/displays adequate comfort level or baseline comfort level  Outcome: Progressing     Problem: Safety - Adult  Goal: Free from fall injury  Outcome: Progressing     Problem: Discharge Planning  Goal: Discharge to home or other facility with appropriate resources  Outcome: Progressing     Problem: Chronic Conditions and Co-morbidities  Goal: Patient's chronic conditions and co-morbidity symptoms are monitored and maintained or improved  Outcome: Progressing       The patient's goals for the shift include  Pain Management    The clinical goals for the shift include pt will remain free from injury throughout shift    Over the shift, the patient did make progress toward the following goals.

## 2024-10-02 NOTE — ANESTHESIA POSTPROCEDURE EVALUATION
Patient: Allyson Armendariz    Procedure Summary       Date: 10/02/24 Room / Location: [facility] OR 07 / [facility]    Anesthesia Start: 1225 Anesthesia Stop: 1513    Procedure: Hip Hemiarthroplasty (Left: Hip) Diagnosis:       Left displaced femoral neck fracture      (Left displaced femoral neck fracture [S72.002A])    Surgeons: Jimmy Alcala MD Responsible Provider: Allison Jackson MD    Anesthesia Type: general ASA Status: 3            Anesthesia Type: general    Vitals Value Taken Time   /75 10/02/24 1509   Temp 36.7 10/02/24 1513   Pulse 61 10/02/24 1511   Resp 9 10/02/24 1511   SpO2 100 % 10/02/24 1511   Vitals shown include unfiled device data.    Anesthesia Post Evaluation    Patient location during evaluation: PACU  Patient participation: complete - patient participated  Level of consciousness: awake  Pain management: adequate  Airway patency: patent  Cardiovascular status: acceptable  Respiratory status: acceptable  Hydration status: acceptable  Postoperative Nausea and Vomiting: none        No notable events documented.

## 2024-10-02 NOTE — CONSULTS
Consults    Allyson Armendariz is a 73 y.o. year old female patient who presents for Hip Hemiarthroplasty (Left: Hip)  with Dr. Alcala on 10/2/24. Acute Pain consulted for block for postoperative pain control.     Anticipated Postop Pain Issues -   Palliative: typically relieved with IV analgesics and regional local anesthetics  Provocative: typically with movement  Quality: typically burning and aching  Radiation: typically none  Severity: typically severe 8-10/10  Timing: typically constant    History reviewed. No pertinent past medical history.     History reviewed. No pertinent surgical history.     No family history on file.     Social History     Socioeconomic History    Marital status: Unknown     Spouse name: Not on file    Number of children: Not on file    Years of education: Not on file    Highest education level: Not on file   Occupational History    Not on file   Tobacco Use    Smoking status: Former     Types: Cigarettes    Smokeless tobacco: Former   Substance and Sexual Activity    Alcohol use: Never    Drug use: Never    Sexual activity: Not on file   Other Topics Concern    Not on file   Social History Narrative    Not on file     Social Determinants of Health     Financial Resource Strain: Low Risk  (9/30/2024)    Overall Financial Resource Strain (CARDIA)     Difficulty of Paying Living Expenses: Not hard at all   Food Insecurity: No Food Insecurity (9/29/2024)    Hunger Vital Sign     Worried About Running Out of Food in the Last Year: Never true     Ran Out of Food in the Last Year: Never true   Transportation Needs: No Transportation Needs (9/30/2024)    PRAPARE - Transportation     Lack of Transportation (Medical): No     Lack of Transportation (Non-Medical): No   Physical Activity: Patient Declined (9/29/2024)    Exercise Vital Sign     Days of Exercise per Week: Patient declined     Minutes of Exercise per Session: Patient declined   Stress: Not on file   Social Connections: Not on file    Intimate Partner Violence: Not At Risk (9/29/2024)    Humiliation, Afraid, Rape, and Kick questionnaire     Fear of Current or Ex-Partner: No     Emotionally Abused: No     Physically Abused: No     Sexually Abused: No   Housing Stability: Low Risk  (9/30/2024)    Housing Stability Vital Sign     Unable to Pay for Housing in the Last Year: No     Number of Times Moved in the Last Year: 0     Homeless in the Last Year: No        No Known Allergies      Review of Systems  Gen: No fatigue, anorexia, insomnia, fever.   Eyes: No vision loss, double vision, drainage, eye pain.   ENT: No pharyngitis, dry mouth, no hearing changes or ear discharge  Cardiac: No chest pain, palpitations, syncope, near syncope.   Pulmonary: No shortness of breath, cough, hemoptysis.   Heme/lymph: No swollen glands, fever, bleeding.   GI: No abdominal pain, change in bowel habits, melena, hematemesis, hematochezia, nausea, vomiting, diarrhea.   : No discharge, dysuria, frequency, urgency, hematuria.  Endo: No polyuria or weight loss.   Musculoskeletal: Negative for any pain or loss of ROM/weakness  Skin: No rashes or lesions  Neuro: Normal speech, no numbness or weakness. No gait difficulties  Review of systems is otherwise negative unless stated above or in history of present illness.    Physical Exam:  Constitutional:  no distress, alert and cooperative  Eyes: clear sclera  Head/Neck: No apparent injury, trachea midline  Respiratory/Thorax: Patent airways, thorax symmetric, breathing comfortably  Cardiovascular: no pitting edema  Gastrointestinal: Nondistended  Musculoskeletal: ROM intact  Extremities: no clubbing  Neurological: alert, kelly x4  Psychological: Appropriate affect    Results for orders placed or performed during the hospital encounter of 09/29/24 (from the past 24 hour(s))   Prepare RBC: 2 Units, Irradiated, Leukocytes Reduced (CMV reduced risk)   Result Value Ref Range    PRODUCT CODE M1139B22     Unit Number [id number]      Unit ABO B     Unit RH NEG     XM INTEP COMP     Dispense Status TR     Blood Expiration Date 10/23/2024 11:59:00 PM EDT     PRODUCT BLOOD TYPE 1700     UNIT VOLUME 350     PRODUCT CODE V7003E21     Unit Number [id number]     Unit ABO O     Unit RH NEG     XM INTEP COMP     Dispense Status TR     Blood Expiration Date 10/5/2024 11:59:00 PM EDT     PRODUCT BLOOD TYPE 9500     UNIT VOLUME 284    VERIFY ABO/Rh Group Test   Result Value Ref Range    ABO TYPE B     Rh TYPE NEG    Magnesium   Result Value Ref Range    Magnesium 1.89 1.60 - 2.40 mg/dL   Renal Function Panel   Result Value Ref Range    Glucose 99 74 - 99 mg/dL    Sodium 135 (L) 136 - 145 mmol/L    Potassium 4.3 3.5 - 5.3 mmol/L    Chloride 100 98 - 107 mmol/L    Bicarbonate 24 21 - 32 mmol/L    Anion Gap 15 10 - 20 mmol/L    Urea Nitrogen 39 (H) 6 - 23 mg/dL    Creatinine 1.27 (H) 0.50 - 1.05 mg/dL    eGFR 44 (L) >60 mL/min/1.73m*2    Calcium 8.3 (L) 8.6 - 10.6 mg/dL    Phosphorus 4.0 2.5 - 4.9 mg/dL    Albumin 2.7 (L) 3.4 - 5.0 g/dL   CBC and Auto Differential   Result Value Ref Range    WBC 13.3 (H) 4.4 - 11.3 x10*3/uL    nRBC 0.0 0.0 - 0.0 /100 WBCs    RBC 3.69 (L) 4.00 - 5.20 x10*6/uL    Hemoglobin 11.3 (L) 12.0 - 16.0 g/dL    Hematocrit 34.6 (L) 36.0 - 46.0 %    MCV 94 80 - 100 fL    MCH 30.6 26.0 - 34.0 pg    MCHC 32.7 32.0 - 36.0 g/dL    RDW 16.1 (H) 11.5 - 14.5 %    Platelets 259 150 - 450 x10*3/uL    Neutrophils % 80.4 40.0 - 80.0 %    Immature Granulocytes %, Automated 0.8 0.0 - 0.9 %    Lymphocytes % 7.7 13.0 - 44.0 %    Monocytes % 10.2 2.0 - 10.0 %    Eosinophils % 0.7 0.0 - 6.0 %    Basophils % 0.2 0.0 - 2.0 %    Neutrophils Absolute 10.70 (H) 1.60 - 5.50 x10*3/uL    Immature Granulocytes Absolute, Automated 0.11 0.00 - 0.50 x10*3/uL    Lymphocytes Absolute 1.03 0.80 - 3.00 x10*3/uL    Monocytes Absolute 1.36 (H) 0.05 - 0.80 x10*3/uL    Eosinophils Absolute 0.09 0.00 - 0.40 x10*3/uL    Basophils Absolute 0.02 0.00 - 0.10 x10*3/uL    Transthoracic Echo (TTE) Limited   Result Value Ref Range    MV E/A ratio 0.62     LV EF 75 %    LVIDd 3.48 cm    LV A4C EF 84.9         Allyson Armendariz is a 73 y.o. year old female patient who presents for Hip Hemiarthroplasty (Left: Hip)  with Dr. Alcala on 10/2/24. Acute Pain consulted for block for postoperative pain control.     Plan:    - L QL blocks performed preoperatively on 10/2/24  - Pain medications per primary team  - Will see on POD1 if inpatient    Acute Pain Team  pg [phone] ph [phone].

## 2024-10-03 LAB
ALBUMIN SERPL BCP-MCNC: 2.4 G/DL (ref 3.4–5)
ALBUMIN SERPL BCP-MCNC: 2.4 G/DL (ref 3.4–5)
ANION GAP SERPL CALC-SCNC: 14 MMOL/L (ref 10–20)
ANION GAP SERPL CALC-SCNC: 14 MMOL/L (ref 10–20)
BASOPHILS # BLD AUTO: 0.02 X10*3/UL (ref 0–0.1)
BASOPHILS # BLD AUTO: 0.02 X10*3/UL (ref 0–0.1)
BASOPHILS NFR BLD AUTO: 0.1 %
BASOPHILS NFR BLD AUTO: 0.1 %
BLOOD EXPIRATION DATE: NORMAL
BLOOD EXPIRATION DATE: NORMAL
BUN SERPL-MCNC: 48 MG/DL (ref 6–23)
BUN SERPL-MCNC: 48 MG/DL (ref 6–23)
CALCIUM SERPL-MCNC: 8 MG/DL (ref 8.6–10.6)
CALCIUM SERPL-MCNC: 8 MG/DL (ref 8.6–10.6)
CHLORIDE SERPL-SCNC: 100 MMOL/L (ref 98–107)
CHLORIDE SERPL-SCNC: 100 MMOL/L (ref 98–107)
CO2 SERPL-SCNC: 26 MMOL/L (ref 21–32)
CO2 SERPL-SCNC: 26 MMOL/L (ref 21–32)
CREAT SERPL-MCNC: 1.77 MG/DL (ref 0.5–1.05)
CREAT SERPL-MCNC: 1.77 MG/DL (ref 0.5–1.05)
DISPENSE STATUS: NORMAL
DISPENSE STATUS: NORMAL
EGFRCR SERPLBLD CKD-EPI 2021: 30 ML/MIN/1.73M*2
EGFRCR SERPLBLD CKD-EPI 2021: 30 ML/MIN/1.73M*2
EOSINOPHIL # BLD AUTO: 0 X10*3/UL (ref 0–0.4)
EOSINOPHIL # BLD AUTO: 0 X10*3/UL (ref 0–0.4)
EOSINOPHIL NFR BLD AUTO: 0 %
EOSINOPHIL NFR BLD AUTO: 0 %
ERYTHROCYTE [DISTWIDTH] IN BLOOD BY AUTOMATED COUNT: 15.9 % (ref 11.5–14.5)
ERYTHROCYTE [DISTWIDTH] IN BLOOD BY AUTOMATED COUNT: 15.9 % (ref 11.5–14.5)
ERYTHROCYTE [DISTWIDTH] IN BLOOD BY AUTOMATED COUNT: 16.1 % (ref 11.5–14.5)
ERYTHROCYTE [DISTWIDTH] IN BLOOD BY AUTOMATED COUNT: 16.1 % (ref 11.5–14.5)
GLUCOSE SERPL-MCNC: 140 MG/DL (ref 74–99)
GLUCOSE SERPL-MCNC: 140 MG/DL (ref 74–99)
HCT VFR BLD AUTO: 26.6 % (ref 36–46)
HCT VFR BLD AUTO: 26.6 % (ref 36–46)
HCT VFR BLD AUTO: 29.4 % (ref 36–46)
HCT VFR BLD AUTO: 29.4 % (ref 36–46)
HGB BLD-MCNC: 8.5 G/DL (ref 12–16)
HGB BLD-MCNC: 8.5 G/DL (ref 12–16)
HGB BLD-MCNC: 9.4 G/DL (ref 12–16)
HGB BLD-MCNC: 9.4 G/DL (ref 12–16)
IMM GRANULOCYTES # BLD AUTO: 0.12 X10*3/UL (ref 0–0.5)
IMM GRANULOCYTES # BLD AUTO: 0.12 X10*3/UL (ref 0–0.5)
IMM GRANULOCYTES NFR BLD AUTO: 0.6 % (ref 0–0.9)
IMM GRANULOCYTES NFR BLD AUTO: 0.6 % (ref 0–0.9)
LACTATE SERPL-SCNC: 1.9 MMOL/L (ref 0.4–2)
LACTATE SERPL-SCNC: 1.9 MMOL/L (ref 0.4–2)
LACTATE SERPL-SCNC: 2.7 MMOL/L (ref 0.4–2)
LACTATE SERPL-SCNC: 2.7 MMOL/L (ref 0.4–2)
LYMPHOCYTES # BLD AUTO: 1.01 X10*3/UL (ref 0.8–3)
LYMPHOCYTES # BLD AUTO: 1.01 X10*3/UL (ref 0.8–3)
LYMPHOCYTES NFR BLD AUTO: 5.3 %
LYMPHOCYTES NFR BLD AUTO: 5.3 %
MAGNESIUM SERPL-MCNC: 1.99 MG/DL (ref 1.6–2.4)
MAGNESIUM SERPL-MCNC: 1.99 MG/DL (ref 1.6–2.4)
MCH RBC QN AUTO: 31 PG (ref 26–34)
MCHC RBC AUTO-ENTMCNC: 32 G/DL (ref 32–36)
MCV RBC AUTO: 97 FL (ref 80–100)
MONOCYTES # BLD AUTO: 1.31 X10*3/UL (ref 0.05–0.8)
MONOCYTES # BLD AUTO: 1.31 X10*3/UL (ref 0.05–0.8)
MONOCYTES NFR BLD AUTO: 6.8 %
MONOCYTES NFR BLD AUTO: 6.8 %
NEUTROPHILS # BLD AUTO: 16.76 X10*3/UL (ref 1.6–5.5)
NEUTROPHILS # BLD AUTO: 16.76 X10*3/UL (ref 1.6–5.5)
NEUTROPHILS NFR BLD AUTO: 87.2 %
NEUTROPHILS NFR BLD AUTO: 87.2 %
NRBC BLD-RTO: 0 /100 WBCS (ref 0–0)
PHOSPHATE SERPL-MCNC: 5.4 MG/DL (ref 2.5–4.9)
PHOSPHATE SERPL-MCNC: 5.4 MG/DL (ref 2.5–4.9)
PLATELET # BLD AUTO: 271 X10*3/UL (ref 150–450)
PLATELET # BLD AUTO: 271 X10*3/UL (ref 150–450)
PLATELET # BLD AUTO: 276 X10*3/UL (ref 150–450)
PLATELET # BLD AUTO: 276 X10*3/UL (ref 150–450)
POTASSIUM SERPL-SCNC: 5.1 MMOL/L (ref 3.5–5.3)
POTASSIUM SERPL-SCNC: 5.1 MMOL/L (ref 3.5–5.3)
PRODUCT BLOOD TYPE: 1700
PRODUCT BLOOD TYPE: 1700
PRODUCT CODE: NORMAL
PRODUCT CODE: NORMAL
RBC # BLD AUTO: 2.74 X10*6/UL (ref 4–5.2)
RBC # BLD AUTO: 2.74 X10*6/UL (ref 4–5.2)
RBC # BLD AUTO: 3.03 X10*6/UL (ref 4–5.2)
RBC # BLD AUTO: 3.03 X10*6/UL (ref 4–5.2)
SODIUM SERPL-SCNC: 135 MMOL/L (ref 136–145)
SODIUM SERPL-SCNC: 135 MMOL/L (ref 136–145)
UNIT ABO: NORMAL
UNIT ABO: NORMAL
UNIT NUMBER: NORMAL
UNIT NUMBER: NORMAL
UNIT RH: NORMAL
UNIT RH: NORMAL
UNIT VOLUME: 350
UNIT VOLUME: 350
WBC # BLD AUTO: 13.3 X10*3/UL (ref 4.4–11.3)
WBC # BLD AUTO: 13.3 X10*3/UL (ref 4.4–11.3)
WBC # BLD AUTO: 19.2 X10*3/UL (ref 4.4–11.3)
WBC # BLD AUTO: 19.2 X10*3/UL (ref 4.4–11.3)
XM INTEP: NORMAL
XM INTEP: NORMAL

## 2024-10-03 PROCEDURE — 85025 COMPLETE CBC W/AUTO DIFF WBC: CPT

## 2024-10-03 PROCEDURE — 83735 ASSAY OF MAGNESIUM: CPT

## 2024-10-03 PROCEDURE — 1170000001 HC PRIVATE ONCOLOGY ROOM DAILY

## 2024-10-03 PROCEDURE — 2500000001 HC RX 250 WO HCPCS SELF ADMINISTERED DRUGS (ALT 637 FOR MEDICARE OP)

## 2024-10-03 PROCEDURE — 36430 TRANSFUSION BLD/BLD COMPNT: CPT

## 2024-10-03 PROCEDURE — P9040 RBC LEUKOREDUCED IRRADIATED: HCPCS

## 2024-10-03 PROCEDURE — 85027 COMPLETE CBC AUTOMATED: CPT

## 2024-10-03 PROCEDURE — 97110 THERAPEUTIC EXERCISES: CPT | Mod: GP

## 2024-10-03 PROCEDURE — 97161 PT EVAL LOW COMPLEX 20 MIN: CPT | Mod: GP

## 2024-10-03 PROCEDURE — 99233 SBSQ HOSP IP/OBS HIGH 50: CPT

## 2024-10-03 PROCEDURE — 99231 SBSQ HOSP IP/OBS SF/LOW 25: CPT | Performed by: STUDENT IN AN ORGANIZED HEALTH CARE EDUCATION/TRAINING PROGRAM

## 2024-10-03 PROCEDURE — 84295 ASSAY OF SERUM SODIUM: CPT

## 2024-10-03 PROCEDURE — 2500000004 HC RX 250 GENERAL PHARMACY W/ HCPCS (ALT 636 FOR OP/ED)

## 2024-10-03 PROCEDURE — 83605 ASSAY OF LACTIC ACID: CPT

## 2024-10-03 PROCEDURE — 2500000005 HC RX 250 GENERAL PHARMACY W/O HCPCS

## 2024-10-03 PROCEDURE — 80069 RENAL FUNCTION PANEL: CPT

## 2024-10-03 RX ORDER — HEPARIN SODIUM 5000 [USP'U]/ML
5000 INJECTION, SOLUTION INTRAVENOUS; SUBCUTANEOUS EVERY 8 HOURS SCHEDULED
Status: DISCONTINUED | OUTPATIENT
Start: 2024-10-03 | End: 2024-10-03

## 2024-10-03 RX ADMIN — OXYCODONE HYDROCHLORIDE 10 MG: 5 TABLET ORAL at 20:19

## 2024-10-03 RX ADMIN — OXYCODONE HYDROCHLORIDE 10 MG: 5 TABLET ORAL at 08:11

## 2024-10-03 RX ADMIN — PANCRELIPASE 2 CAPSULE: 120000; 24000; 76000 CAPSULE, DELAYED RELEASE PELLETS ORAL at 17:26

## 2024-10-03 RX ADMIN — GABAPENTIN 300 MG: 300 CAPSULE ORAL at 14:48

## 2024-10-03 RX ADMIN — GABAPENTIN 300 MG: 300 CAPSULE ORAL at 08:06

## 2024-10-03 RX ADMIN — DIPHENHYDRAMINE HYDROCHLORIDE AND LIDOCAINE HYDROCHLORIDE AND ALUMINUM HYDROXIDE AND MAGNESIUM HYDRO 10 ML: KIT at 17:27

## 2024-10-03 RX ADMIN — SODIUM CHLORIDE, POTASSIUM CHLORIDE, SODIUM LACTATE AND CALCIUM CHLORIDE 1000 ML: 600; 310; 30; 20 INJECTION, SOLUTION INTRAVENOUS at 11:44

## 2024-10-03 RX ADMIN — CEFAZOLIN SODIUM 2 G: 2 INJECTION, SOLUTION INTRAVENOUS at 13:04

## 2024-10-03 RX ADMIN — SODIUM CHLORIDE, SODIUM LACTATE, POTASSIUM CHLORIDE, AND CALCIUM CHLORIDE 1000 ML: 600; 310; 30; 20 INJECTION, SOLUTION INTRAVENOUS at 09:48

## 2024-10-03 RX ADMIN — OXYCODONE HYDROCHLORIDE 10 MG: 5 TABLET ORAL at 14:47

## 2024-10-03 RX ADMIN — GABAPENTIN 300 MG: 300 CAPSULE ORAL at 20:19

## 2024-10-03 RX ADMIN — PANTOPRAZOLE SODIUM 40 MG: 40 TABLET, DELAYED RELEASE ORAL at 08:06

## 2024-10-03 RX ADMIN — CEFAZOLIN SODIUM 2 G: 2 INJECTION, SOLUTION INTRAVENOUS at 00:22

## 2024-10-03 RX ADMIN — DULOXETINE HYDROCHLORIDE 30 MG: 30 CAPSULE, DELAYED RELEASE ORAL at 08:06

## 2024-10-03 RX ADMIN — PANCRELIPASE 2 CAPSULE: 120000; 24000; 76000 CAPSULE, DELAYED RELEASE PELLETS ORAL at 12:30

## 2024-10-03 RX ADMIN — CYANOCOBALAMIN TAB 1000 MCG 1000 MCG: 1000 TAB at 08:06

## 2024-10-03 RX ADMIN — POLYETHYLENE GLYCOL 3350 17 G: 17 POWDER, FOR SOLUTION ORAL at 08:07

## 2024-10-03 RX ADMIN — PANCRELIPASE 2 CAPSULE: 120000; 24000; 76000 CAPSULE, DELAYED RELEASE PELLETS ORAL at 08:05

## 2024-10-03 ASSESSMENT — PAIN - FUNCTIONAL ASSESSMENT
PAIN_FUNCTIONAL_ASSESSMENT: 0-10

## 2024-10-03 ASSESSMENT — PAIN SCALES - GENERAL
PAINLEVEL_OUTOF10: 7
PAINLEVEL_OUTOF10: 0 - NO PAIN
PAINLEVEL_OUTOF10: 7
PAINLEVEL_OUTOF10: 1

## 2024-10-03 ASSESSMENT — COGNITIVE AND FUNCTIONAL STATUS - GENERAL
CLIMB 3 TO 5 STEPS WITH RAILING: TOTAL
WALKING IN HOSPITAL ROOM: TOTAL
MOBILITY SCORE: 12
DRESSING REGULAR UPPER BODY CLOTHING: A LITTLE
TURNING FROM BACK TO SIDE WHILE IN FLAT BAD: A LITTLE
HELP NEEDED FOR BATHING: A LITTLE
DAILY ACTIVITIY SCORE: 18
MOVING TO AND FROM BED TO CHAIR: A LOT
TOILETING: A LITTLE
MOBILITY SCORE: 14
STANDING UP FROM CHAIR USING ARMS: TOTAL
CLIMB 3 TO 5 STEPS WITH RAILING: TOTAL
DRESSING REGULAR LOWER BODY CLOTHING: A LOT
TURNING FROM BACK TO SIDE WHILE IN FLAT BAD: A LITTLE
PERSONAL GROOMING: A LITTLE
STANDING UP FROM CHAIR USING ARMS: A LOT
WALKING IN HOSPITAL ROOM: A LOT
MOVING TO AND FROM BED TO CHAIR: A LITTLE
MOVING FROM LYING ON BACK TO SITTING ON SIDE OF FLAT BED WITH BEDRAILS: A LITTLE

## 2024-10-03 ASSESSMENT — PAIN DESCRIPTION - LOCATION
LOCATION: ABDOMEN
LOCATION: ABDOMEN

## 2024-10-03 NOTE — PROGRESS NOTES
Physical Therapy                 Therapy Communication Note    Patient Name: Allyson Armendariz  MRN: [id number]  Department: [facility]  Room: 60/6011-A  Today's Date: 10/3/2024     Discipline: Physical Therapy    Missed Visit Reason: Missed Visit Reason:  (BP 87/49(56).  PT will re-attempt as schedule allows and pt is medically appropriate)    Missed Time: Attempt 0851

## 2024-10-03 NOTE — CARE PLAN
The patient's goals for the shift include  manage pain     The clinical goals for the shift include Patient will remain hemodynamically stable and free from fall/injury for entirety of shift.      Problem: Fall/Injury  Goal: Not fall by end of shift  Outcome: Progressing  Goal: Be free from injury by end of the shift  Outcome: Progressing  Goal: Verbalize understanding of personal risk factors for fall in the hospital  Outcome: Progressing  Goal: Verbalize understanding of risk factor reduction measures to prevent injury from fall in the home  Outcome: Progressing  Goal: Use assistive devices by end of the shift  Outcome: Progressing  Goal: Pace activities to prevent fatigue by end of the shift  Outcome: Progressing     Problem: Pain - Adult  Goal: Verbalizes/displays adequate comfort level or baseline comfort level  Outcome: Progressing     Problem: Safety - Adult  Goal: Free from fall injury  Outcome: Progressing     Problem: Discharge Planning  Goal: Discharge to home or other facility with appropriate resources  Outcome: Progressing     Problem: Chronic Conditions and Co-morbidities  Goal: Patient's chronic conditions and co-morbidity symptoms are monitored and maintained or improved  Outcome: Progressing     Problem: Skin  Goal: Decreased wound size/increased tissue granulation at next dressing change  Outcome: Progressing  Flowsheets (Taken 10/3/2024 1247)  Decreased wound size/increased tissue granulation at next dressing change:   Promote sleep for wound healing   Utilize specialty bed per algorithm   Protective dressings over bony prominences  Goal: Participates in plan/prevention/treatment measures  Outcome: Progressing  Flowsheets (Taken 10/3/2024 1247)  Participates in plan/prevention/treatment measures:   Discuss with provider PT/OT consult   Elevate heels   Increase activity/out of bed for meals  Goal: Prevent/manage excess moisture  Outcome: Progressing  Flowsheets (Taken 10/3/2024  1247)  Prevent/manage excess moisture:   Cleanse incontinence/protect with barrier cream   Moisturize dry skin   Use wicking fabric (obtain order)   Follow provider orders for dressing changes   Monitor for/manage infection if present  Goal: Prevent/minimize sheer/friction injuries  Outcome: Progressing  Flowsheets (Taken 10/3/2024 1247)  Prevent/minimize sheer/friction injuries:   Complete micro-shifts as needed if patient unable. Adjust patient position to relieve pressure points, not a full turn   HOB 30 degrees or less   Increase activity/out of bed for meals   Turn/reposition every 2 hours/use positioning/transfer devices   Use pull sheet   Utilize specialty bed per algorithm  Goal: Promote/optimize nutrition  Outcome: Progressing  Flowsheets (Taken 10/3/2024 1247)  Promote/optimize nutrition:   Assist with feeding   Discuss with provider if NPO > 2 days   Offer water/supplements/favorite foods   Consume > 50% meals/supplements   Monitor/record intake including meals   Reassess MST if dietician not consulted  Goal: Promote skin healing  Outcome: Progressing  Flowsheets (Taken 10/3/2024 1247)  Promote skin healing:   Assess skin/pad under line(s)/device(s)   Ensure correct size (line/device) and apply per  instructions   Protective dressings over bony prominences   Rotate device position/do not position patient on device   Turn/reposition every 2 hours/use positioning/transfer devices

## 2024-10-03 NOTE — PROGRESS NOTES
Allyson Armendariz is a 73 y.o. year old female patient who presents for Hip Hemiarthroplasty (Left: Hip)  with Dr. Alcala on 10/2/24. Acute Pain consulted for block for postoperative pain control.     Postop Pain HPI -   Palliative: relieved with IV analgesics and regional local anesthetics  Provocative: movement  Quality:  burning and aching  Radiation:  none  Severity:  0/10  Timing: constant    24-HOUR OPIOID CONSUMPTION:  none    Scheduled medications  ceFAZolin, 2 g, intravenous, q12h  cyanocobalamin, 1,000 mcg, oral, Daily  DULoxetine, 30 mg, oral, Daily  [Held by provider] enoxaparin, 40 mg, subcutaneous, Daily  ergocalciferol, 1,250 mcg, oral, Weekly  gabapentin, 300 mg, oral, TID  lactated Ringer's, 1,000 mL, intravenous, Once  lipase-protease-amylase, 2 capsule, oral, TID  metoprolol tartrate, 50 mg, oral, TID  pantoprazole, 40 mg, oral, Daily before breakfast  polyethylene glycol, 17 g, oral, Daily      Continuous medications     PRN medications  PRN medications: acetaminophen, oxyCODONE     Physical Exam:  Constitutional:  no distress, alert and cooperative  Eyes: clear sclera  Head/Neck: No apparent injury, trachea midline  Respiratory/Thorax: Patent airways, thorax symmetric, breathing comfortably  Cardiovascular: no pitting edema  Gastrointestinal: Nondistended  Musculoskeletal: ROM intact  Extremities: no clubbing  Neurological: alert, kelly x4  Psychological: Appropriate affect    Results for orders placed or performed during the hospital encounter of 09/29/24 (from the past 24 hour(s))   Transthoracic Echo (TTE) Limited   Result Value Ref Range    MV E/A ratio 0.62     LV EF 75 %    LVIDd 3.48 cm    LV A4C EF 84.9    Magnesium   Result Value Ref Range    Magnesium 1.99 1.60 - 2.40 mg/dL   Renal Function Panel   Result Value Ref Range    Glucose 140 (H) 74 - 99 mg/dL    Sodium 135 (L) 136 - 145 mmol/L    Potassium 5.1 3.5 - 5.3 mmol/L    Chloride 100 98 - 107 mmol/L    Bicarbonate 26 21 - 32  mmol/L    Anion Gap 14 10 - 20 mmol/L    Urea Nitrogen 48 (H) 6 - 23 mg/dL    Creatinine 1.77 (H) 0.50 - 1.05 mg/dL    eGFR 30 (L) >60 mL/min/1.73m*2    Calcium 8.0 (L) 8.6 - 10.6 mg/dL    Phosphorus 5.4 (H) 2.5 - 4.9 mg/dL    Albumin 2.4 (L) 3.4 - 5.0 g/dL   CBC and Auto Differential   Result Value Ref Range    WBC 19.2 (H) 4.4 - 11.3 x10*3/uL    nRBC 0.0 0.0 - 0.0 /100 WBCs    RBC 3.03 (L) 4.00 - 5.20 x10*6/uL    Hemoglobin 9.4 (L) 12.0 - 16.0 g/dL    Hematocrit 29.4 (L) 36.0 - 46.0 %    MCV 97 80 - 100 fL    MCH 31.0 26.0 - 34.0 pg    MCHC 32.0 32.0 - 36.0 g/dL    RDW 16.1 (H) 11.5 - 14.5 %    Platelets 271 150 - 450 x10*3/uL    Neutrophils % 87.2 40.0 - 80.0 %    Immature Granulocytes %, Automated 0.6 0.0 - 0.9 %    Lymphocytes % 5.3 13.0 - 44.0 %    Monocytes % 6.8 2.0 - 10.0 %    Eosinophils % 0.0 0.0 - 6.0 %    Basophils % 0.1 0.0 - 2.0 %    Neutrophils Absolute 16.76 (H) 1.60 - 5.50 x10*3/uL    Immature Granulocytes Absolute, Automated 0.12 0.00 - 0.50 x10*3/uL    Lymphocytes Absolute 1.01 0.80 - 3.00 x10*3/uL    Monocytes Absolute 1.31 (H) 0.05 - 0.80 x10*3/uL    Eosinophils Absolute 0.00 0.00 - 0.40 x10*3/uL    Basophils Absolute 0.02 0.00 - 0.10 x10*3/uL       Allyson Armendariz is a 73 y.o. year old female patient who presents for Hip Hemiarthroplasty (Left: Hip)  with Dr. Alcala on 10/2/24. Acute Pain consulted for block for postoperative pain control.      Plan:     - L QL blocks performed preoperatively on 10/2/24  - Pain medications per primary team  - Acute pain team will sign off      Acute Pain Team  pg [phone] ph [phone].

## 2024-10-03 NOTE — PROGRESS NOTES
Occupational Therapy                 Therapy Communication Note    Patient Name: Allyson Armendariz  MRN: [id number]  Department: [facility]  Room: 6011/6011-A  Today's Date: 10/3/2024     Discipline: Occupational Therapy    Missed Visit Reason: Missed Visit Reason:  (eating breakfast)    Missed Time: Attempt    Comment:

## 2024-10-03 NOTE — PROGRESS NOTES
Physical Therapy                 Therapy Communication Note    Patient Name: Allyson Armendariz  MRN: [id number]  Department: [facility]  Room: 6011/6011-A  Today's Date: 10/3/2024     Discipline: Physical Therapy    Missed Visit Reason: Missed Visit Reason:  Pt requesting to eat breakfast.  PT will re-attempt as schedule allows)    Missed Time: Attempt 0859

## 2024-10-03 NOTE — BRIEF OP NOTE
Date: 10/2/2024  OR Location: [facility] OR    Name: Allyson Armendariz, : 1950, Age: 73 y.o., MRN: [id number], Sex: female    Diagnosis  Preoperative diagnosis: Left proximal femur metastatic cancer with pathologic fracture Postoperative diagnosis: same     Procedures  Hip Hemiarthroplasty  65871 - PA HEMIARTHROPLASTY HIP PARTIAL  Left proximal femur radical resection    Surgeons      * Jimmy Alcala - Primary    Resident/Fellow/Other Assistant:  Surgeons and Role:     * Ariel Dsouza MD - Resident - Assisting     * SHEREEN Castro-C - TERE First Assist: She was my primary assistant for this procedure.  There no available residents to assist with for this procedure.  Her assistance was needed and critical to the success of this procedure.  She assisted with surgical exposure, placement of retractors and implantation of implant/prosthesis and wound closure      Procedure Summary  Anesthesia: General  ASA: III  Anesthesia Staff: Anesthesiologist: Allison Jackson MD; Joyce Saldivar MD; Marleny Schilling MD  C-AA: PATRICIA Goel  Estimated Blood Loss: 100 mL  Intra-op Medications:   Administrations occurring from 1030 to 1305 on 10/02/24:   Medication Name Total Dose   acetaminophen (Tylenol) tablet 975 mg Cannot be calculated   cyanocobalamin (Vitamin B-12) tablet 1,000 mcg Cannot be calculated   DULoxetine (Cymbalta) DR capsule 30 mg Cannot be calculated   ergocalciferol (Vitamin D-2) capsule 1,250 mcg Cannot be calculated   gabapentin (Neurontin) capsule 300 mg Cannot be calculated   lipase-protease-amylase (Creon) 24,000-76,000 -120,000 unit per capsule 2 capsule Cannot be calculated   metoprolol tartrate (Lopressor) tablet 50 mg Cannot be calculated   oxyCODONE (Roxicodone) immediate release tablet 10 mg Cannot be calculated   pantoprazole (ProtoNix) EC tablet 40 mg Cannot be calculated              Anesthesia Record               Intraprocedure I/O Totals          Intake    Tranexamic Acid 0.00 mL     The total shown is the total volume documented since Anesthesia Start was filed.    Total Intake 0 mL          Specimen:   ID Type Source Tests Collected by Time   1 : Left Femoral Head and Hip Joint Tissue FEMORAL HEAD LEFT SURGICAL PATHOLOGY EXAM Jimmy Alcala MD 10/2/2024 1426        Staff:   Circulator: Chaya Wing Person: Beau Wing Person: Clarissa Robins Circulator: Blanca Robins Scrub: Chaya Robins Scrub: Niurka          Findings: consistent with preoperative imaging and diagnosis    Complications:  None; patient tolerated the procedure well.     Disposition: PACU - hemodynamically stable.  Condition: stable  Specimens Collected:   ID Type Source Tests Collected by Time   1 : Left Femoral Head and Hip Joint Tissue FEMORAL HEAD LEFT SURGICAL PATHOLOGY EXAM Jimmy Alcala MD 10/2/2024 0646     Attending Attestation: I was present and scrubbed for the entire procedure.    Jimmy Alcala  Phone Number: [phone]

## 2024-10-03 NOTE — PROGRESS NOTES
Allyson Armendariz is a 73 y.o. female on day 4 of admission presenting with Pancreatic cancer (Multi).    Subjective   No acute events overnight. This AM, patient reports feeling well after surgery. She did note that she had not urinated since surgery. Denies pain, shortness of breath, lightheadedness, dizziness, leg swelling.    Bladder scan showed 395 ml of urine, straight cath with output of 450 ml.     Objective   Last Recorded Vitals  /62 (BP Location: Right leg, Patient Position: Lying)   Pulse 63   Temp 36.2 °C (97.2 °F) (Temporal)   Resp 18   Wt 57.3 kg (126 lb 5.2 oz)   SpO2 94%   Intake/Output last 3 Shifts:    Intake/Output Summary (Last 24 hours) at 10/3/2024 0717  Last data filed at 10/3/2024 0335  Gross per 24 hour   Intake 208.33 ml   Output 400 ml   Net -191.67 ml   Admission Weight  Weight: 57.3 kg (126 lb 6.4 oz) (09/29/24 2156)  Daily Weight  10/02/24 : 57.3 kg (126 lb 5.2 oz)    Image Results  XR pelvis 1-2 views  Narrative: Interpreted By:  Milly Leiva,   STUDY:  Single view pelvis.      INDICATION:  Signs/Symptoms:post op hip.      COMPARISON:  09/30/2024.      ACCESSION NUMBER(S):  [id number]      ORDERING CLINICIAN:  SILVIA COLIN      FINDINGS:  No acute fracture or malalignment.  Immediate postsurgical changes of left hip hemiarthroplasty.  Hardware is intact without perihardware fractures or lucencies.  Expected postsurgical soft tissue gas within the surrounding soft  tissues. Right hip joint space is maintained.      Impression: 1. Immediate postsurgical changes of left hip hemiarthroplasty for  femoral neck fracture without hardware complication.      MACRO:  None.      Signed by: Milly Leiva 10/2/2024 4:09 PM  Dictation workstation:   [id number]  FL fluoro images no charge  These images are not reportable by radiology and will not be interpreted   by  Radiologists.  Transthoracic Echo (TTE) Limited     [facility], [address] [place] [address]  [place] [address]                 Tel [phone] and Fax [phone]    TRANSTHORACIC ECHOCARDIOGRAM REPORT       Patient Name:      LISSETH LUJAN       Reading Physician:    [id number] Jimmy France MD  Study Date:        10/2/2024            Ordering Provider:    [id number] JESSICA GATESKRABARTI  MRN/PID:           [id number]             Fellow:  Accession#:        [id number]         Nurse:                Lilia Matt RN  Date of Birth/Age: 1950 / 73      Sonographer:          Marni ghosh RDCS, AVELINA  Gender:            F                    Additional Staff:  Height:            160.02 cm            Admit Date:           9/29/2024  Weight:            57.15 kg             Admission Status:     Inpatient - STAT  BSA / BMI:         1.59 m2 / 22.32      Encounter#:           [id number]                     kg/m2  Blood Pressure:    163/77 mmHg          Department Location:  [facility] Non                                                                Invasive    Study Type:    TRANSTHORACIC ECHO (TTE) LIMITED  Diagnosis/ICD: Cardiac murmur, unspecified-R01.1  Indication:    Eval LVOT post fluids  CPT Code:      Echo Limited-14584; Doppler Limited-14439; Color Doppler-85548    Patient History:  Pertinent History: Pancreatic cancer.    Study Detail: The following Echo studies were performed: 2D, M-Mode, Doppler and                color flow. Definity used as a contrast agent for endocardial                border definition. Total contrast used for this procedure was 2 mL                via IV push.       PHYSICIAN INTERPRETATION:  Left Ventricle: Left ventricular ejection fraction is hyperdynamic, by visual estimate at 75%. There are no regional left ventricular wall motion abnormalities. The left ventricular cavity size is normal. The left  ventricular septal wall thickness is mildly increased. Spectral Doppler shows an impaired relaxation pattern of left ventricular diastolic filling. There is a mild a mid cavity left ventricular obstruction. The resting gradient is 20 mmHg. The provoked gradient is 29 mmHg.  Left Atrium: The left atrium is upper limits of normal in size.  Right Ventricle: The right ventricle is normal in size. There is normal right ventricular global systolic function.  Right Atrium: The right atrium is normal in size.  Aortic Valve: The aortic valve was not well visualized. There is minimal aortic valve cusp calcification. There is trace aortic valve regurgitation.  Mitral Valve: The mitral valve is normal in structure. There is mild mitral annular calcification. There is trace mitral valve regurgitation.  Tricuspid Valve: The tricuspid valve was not well visualized. Tricuspid regurgitation was not assessed.  Pulmonic Valve: The pulmonic valve is not well visualized. The pulmonic valve regurgitation was not well visualized.  Pericardium: Trivial pericardial effusion.  Aorta: The aortic root is normal.  Systemic Veins: The inferior vena cava appears normal in size.  In comparison to the previous echocardiogram(s): Compared with study dated 9/30/2024, AV/LVOT/LV gradietns are now reduced.       CONCLUSIONS:   1. Poorly visualized anatomical structures due to suboptimal image quality.   2. Left ventricular ejection fraction is hyperdynamic, by visual estimate at 75%.   3. Spectral Doppler shows an impaired relaxation pattern of left ventricular diastolic filling.   4. There is a mild a mid cavity left ventricular obstruction. The resting gradient is 20 mmHg. The provoked gradient is 29 mmHg.         5. There is normal right ventricular global systolic function.    QUANTITATIVE DATA SUMMARY:     2D MEASUREMENTS:          Normal Ranges:  IVSd:            1.20 cm  (0.6-1.1cm)  LVPWd:           0.82 cm  (0.6-1.1cm)  LVIDd:           3.48  cm  (3.9-5.9cm)  LVIDs:           2.73 cm  LV Mass Index:   65 g/m2  LVEDV Index:     29 ml/m2  LV % FS          21.7 %       LA VOLUME:                   Normal Ranges:  LA Vol A4C:        52.4 ml   (22+/-6mL/m2)  LA Vol Index A4C:  33.0ml/m2  LA Area A4C:       17.2 cm2  LA Major Axis A4C: 4.8 cm       AORTA MEASUREMENTS:         Normal Ranges:  Ao Sinus, d:        3.00 cm (2.1-3.5cm)       LV SYSTOLIC FUNCTION BY 2D PLANIMETRY (MOD):                       Normal Ranges:  EF-A4C View:    85 % (>=55%)  EF-A2C View:    77 %  EF-Biplane:     82 %  EF-Visual:      75 %  LV EF Reported: 75 %       LV DIASTOLIC FUNCTION:             Normal Ranges:  MV Peak E:             0.76 m/s    (0.7-1.2 m/s)  MV Peak A:             1.21 m/s    (0.42-0.7 m/s)  E/A Ratio:             0.62        (1.0-2.2)  MV A Dur:              100.35 msec  MV DT:                 286 msec    (150-240 msec)       MITRAL VALVE:          Normal Ranges:  MV DT:        286 msec (150-240msec)       TRICUSPID VALVE/RVSP:         Normal Ranges:  IVC Diam:             1.70 cm       [id number] Jimmy France MD  Electronically signed on 10/2/2024 at 10:16:19 AM       ** Final **    Physical Exam  General: well-appearing, no acute distress, resting comfortably in bed  HEENT: normocephalic, atraumatic  Cardio: regular rate and rhythm, normal S1 and S2, holosystolic murmur  Pulm: clear to auscultation bilaterally, no wheezes, crackles, or rhonchi, breathing comfortably on room air  Abd: normal, active bowel sounds; soft, non-tender, non-distended  Extremities: no peripheral edema, scars, or lesions  Neuro: alert and oriented to person, place, and time, CN II-XII grossly intact  Psych: mood and affect are appropriate    Labs  Results for orders placed or performed during the hospital encounter of 09/29/24 (from the past 24 hour(s))   Transthoracic Echo (TTE) Limited   Result Value Ref Range    MV E/A ratio 0.62     LV EF 75 %    LVIDd 3.48 cm    LV A4C EF 84.9     Magnesium   Result Value Ref Range    Magnesium 1.99 1.60 - 2.40 mg/dL   Renal Function Panel   Result Value Ref Range    Glucose 140 (H) 74 - 99 mg/dL    Sodium 135 (L) 136 - 145 mmol/L    Potassium 5.1 3.5 - 5.3 mmol/L    Chloride 100 98 - 107 mmol/L    Bicarbonate 26 21 - 32 mmol/L    Anion Gap 14 10 - 20 mmol/L    Urea Nitrogen 48 (H) 6 - 23 mg/dL    Creatinine 1.77 (H) 0.50 - 1.05 mg/dL    eGFR 30 (L) >60 mL/min/1.73m*2    Calcium 8.0 (L) 8.6 - 10.6 mg/dL    Phosphorus 5.4 (H) 2.5 - 4.9 mg/dL    Albumin 2.4 (L) 3.4 - 5.0 g/dL   CBC and Auto Differential   Result Value Ref Range    WBC 19.2 (H) 4.4 - 11.3 x10*3/uL    nRBC 0.0 0.0 - 0.0 /100 WBCs    RBC 3.03 (L) 4.00 - 5.20 x10*6/uL    Hemoglobin 9.4 (L) 12.0 - 16.0 g/dL    Hematocrit 29.4 (L) 36.0 - 46.0 %    MCV 97 80 - 100 fL    MCH 31.0 26.0 - 34.0 pg    MCHC 32.0 32.0 - 36.0 g/dL    RDW 16.1 (H) 11.5 - 14.5 %    Platelets 271 150 - 450 x10*3/uL    Neutrophils % 87.2 40.0 - 80.0 %    Immature Granulocytes %, Automated 0.6 0.0 - 0.9 %    Lymphocytes % 5.3 13.0 - 44.0 %    Monocytes % 6.8 2.0 - 10.0 %    Eosinophils % 0.0 0.0 - 6.0 %    Basophils % 0.1 0.0 - 2.0 %    Neutrophils Absolute 16.76 (H) 1.60 - 5.50 x10*3/uL    Immature Granulocytes Absolute, Automated 0.12 0.00 - 0.50 x10*3/uL    Lymphocytes Absolute 1.01 0.80 - 3.00 x10*3/uL    Monocytes Absolute 1.31 (H) 0.05 - 0.80 x10*3/uL    Eosinophils Absolute 0.00 0.00 - 0.40 x10*3/uL    Basophils Absolute 0.02 0.00 - 0.10 x10*3/uL     Assessment/Plan:  Allyson Armendariz is a 74 yo F w PMH of metastatic pancreatic adenocarcinoma (with mets to liver) and Crohn's disease presenting as a transfer from [facility] for further evaluation with orthopedic surgery after being found to have L complete pathologic femur fracture. Patient is planned for surgery with ortho tomorrow. Patient found to have systolic murmur on cardiac exam which is being further evaluated prior to surgery. Echo showed LV outflow tract obstruction,  cardiology consulted for recs. Cardiology cleared after repeat limited echo showed improvement in LV outflow tract obstruction following blood transfusion. Patient underwent L hemiarthroplasty on 10/2. Patient now having some urinary retention, worsening kidney function, and was noted to be hypotensive overnight and this AM though not complaining of lightheadedness or dizziness. Due to worsening kidney function and hypotension, likely due to hypovolemia.     Updates 10/03  - giving 1L LR bolus this AM and following blood pressures, held AM metoprolol dose  - started ancef 2 g q12 x 3 doses per ortho recs for prophylaxis  - will encourage incentive spirometry  - wound care: mepilex for 7 days, after 7 days, will leave to open air  - will initiate subcutaneous heparin (d/t decreased GFR) starting tonight  - patient will require 6 weeks of DVT prophylaxis per orthopedic surgery  - PT/OT today     #Complete pathologic fracture of L femur  Patient had significant pain in L leg for past several months with negative imaging. Found to have pathologic fracture of L femur on CT scans at [facility] (scans can now be found in our system).   PLAN:  - s/p surgery with ortho today  - started ancef 2 g q8h x 3 doses per ortho recs for prophylaxis  - will encourage incentive spirometry  - wound care: mepilex for 7 days, after 7 days, will leave to open air  - will initiate subcutaneous heparin (d/t decreased GFR) starting tonight  - patient will require 6 weeks of DVT prophylaxis per orthopedic surgery  - PT/OT today    Ortho recs: pt should be weightbearing as tolerated until first follow up appointment. Patient will require 6 weeks total of DVT prophylaxis from an orthopedic standpoint, if ok per primary team. Patient currently has mepilex x1 on surgical site. Dressing should be removed POD7. Please send home with Calcium/Vitamin D 500mg-400IU BID for 6 weeks. Patient should follow up w/ Dr. Alcala 2 weeks after surgery for  post-operative appointment (patient may call [phone] to schedule). Please page with questions.     #Hypotension  Patient noted to be hypotensive overnight and today. Also having worsening creatinine and BUN today, likely d/t low volume status.  PLAN:  - holding AM metoprolol  - getting 1L LR today     #Holosystolic Murmur  Patient found to have holosystolic murmur on exam. Patient does not have known history of valvular dysfunction. Echo showed LV outflow obstruction that improved on repeat echo following transfusion. Due to low pressures, metoprolol held this AM.  PLAN:  - started metoprolol tartrate 50 TID, will get repeat echo following transfusion  - following-up with cardiology to double check on metoprolol recs post-op     #Metastatic Pancreatic Adenocarcinoma  Patient has metastatic pancreatic adenocarcinoma w mets to the liver. Follows with Dr. Hightower at [facility]. S/p gem/abraxane, which was stopped August 2024. Plan was to biopsy liver lesions with EUS to guide further treatment, but patient's labs are not concerning for obstruction.  PLAN:  - will reach out to Dr. Hightower to inform about admission  - continue vitamin B and D and creon 64117 TID  - pain regimen: duloxetine 30 mg daily, gabapentin 300 mg TID, acetaminophen 975 mg q6h PRN mild pain, oxycodone 10 mg q4h PRN severe pain  - will consider supportive oncology consult if patient has worsening pain      Malnutrition Diagnosis Status: New  Malnutrition Diagnosis: Moderate malnutrition related to chronic disease or condition  As Evidenced by: <75% of estimated energy needs for >= 1 month; mild muscle wasting and fat loss  I agree with the dietitian's malnutrition diagnosis.    F: s/p 1L LR today, PRN  E: K > 4, Mg > 2  N: regular diet  A: mediport  GI: pantoprazole, miralax  DVT: subcutaneous heparin  Abx: ancef (post surgery prophylaxis, 2g q12h x 3 doses)  Dispo: pending PT/OT  Pain: duloxetine 30 mg daily, gabapentin 300 mg TID, acetaminophen 975 mg q6h  PRN mild pain, oxycodone 10 mg q4h PRN severe pain     Code: DNR/DNI (confirmed on admission)  NOK: Oriana (, [phone]), Shameka (daughter, [phone])     Patient seen and discussed with attending, Dr. Rahel Maldonado MD  Internal Medicine PGY1  [facility] Team [id number]

## 2024-10-03 NOTE — PROGRESS NOTES
Occupational Therapy                 Therapy Communication Note    Patient Name: Allyson Armendariz  MRN: [id number]  Department: [facility]  Room: 6011/6011-A  Today's Date: 10/3/2024     Discipline: Occupational Therapy    Missed Visit Reason: Missed Visit Reason:  (low BP will re att as vicki)    Missed Time: Attempt    Comment:

## 2024-10-03 NOTE — PROGRESS NOTES
"Orthopaedic Surgery Progress Note    Subjective:    NAEO. AFVSS. Resting comfortably in bed this AM. Somnolent but arousable.     Objective:  /62 (BP Location: Right leg, Patient Position: Lying)   Pulse 63   Temp 36.2 °C (97.2 °F) (Temporal)   Resp 18   Ht 1.6 m (5' 3\")   Wt 57.3 kg (126 lb 5.2 oz)   SpO2 94%   BMI 22.38 kg/m²     Gen: arousable, NAD  Cardiac: RRR to peripheral palpation  Resp: nonlabored on RA  GI: soft, non-distended  MSK:  LLE:   - postoperative mepilex x1 in place without strike through   - appropriately warm and tender postoperative extremity  - 5/5 DF/PF/EHL  - SILT distally  - Foot wwp, 2+ DP/PT pulse, brisk cap refill  - Compartments soft and compressible, no pain with passive dorsiflexion      Results for orders placed or performed during the hospital encounter of 09/29/24 (from the past 24 hour(s))   Transthoracic Echo (TTE) Limited   Result Value Ref Range    MV E/A ratio 0.62     LV EF 75 %    LVIDd 3.48 cm    LV A4C EF 84.9    Magnesium   Result Value Ref Range    Magnesium 1.99 1.60 - 2.40 mg/dL   Renal Function Panel   Result Value Ref Range    Glucose 140 (H) 74 - 99 mg/dL    Sodium 135 (L) 136 - 145 mmol/L    Potassium 5.1 3.5 - 5.3 mmol/L    Chloride 100 98 - 107 mmol/L    Bicarbonate 26 21 - 32 mmol/L    Anion Gap 14 10 - 20 mmol/L    Urea Nitrogen 48 (H) 6 - 23 mg/dL    Creatinine 1.77 (H) 0.50 - 1.05 mg/dL    eGFR 30 (L) >60 mL/min/1.73m*2    Calcium 8.0 (L) 8.6 - 10.6 mg/dL    Phosphorus 5.4 (H) 2.5 - 4.9 mg/dL    Albumin 2.4 (L) 3.4 - 5.0 g/dL   CBC and Auto Differential   Result Value Ref Range    WBC 19.2 (H) 4.4 - 11.3 x10*3/uL    nRBC 0.0 0.0 - 0.0 /100 WBCs    RBC 3.03 (L) 4.00 - 5.20 x10*6/uL    Hemoglobin 9.4 (L) 12.0 - 16.0 g/dL    Hematocrit 29.4 (L) 36.0 - 46.0 %    MCV 97 80 - 100 fL    MCH 31.0 26.0 - 34.0 pg    MCHC 32.0 32.0 - 36.0 g/dL    RDW 16.1 (H) 11.5 - 14.5 %    Platelets 271 150 - 450 x10*3/uL    Neutrophils % 87.2 40.0 - 80.0 %    Immature " Granulocytes %, Automated 0.6 0.0 - 0.9 %    Lymphocytes % 5.3 13.0 - 44.0 %    Monocytes % 6.8 2.0 - 10.0 %    Eosinophils % 0.0 0.0 - 6.0 %    Basophils % 0.1 0.0 - 2.0 %    Neutrophils Absolute 16.76 (H) 1.60 - 5.50 x10*3/uL    Immature Granulocytes Absolute, Automated 0.12 0.00 - 0.50 x10*3/uL    Lymphocytes Absolute 1.01 0.80 - 3.00 x10*3/uL    Monocytes Absolute 1.31 (H) 0.05 - 0.80 x10*3/uL    Eosinophils Absolute 0.00 0.00 - 0.40 x10*3/uL    Basophils Absolute 0.02 0.00 - 0.10 x10*3/uL       XR pelvis 1-2 views   Final Result   1. Immediate postsurgical changes of left hip hemiarthroplasty for   femoral neck fracture without hardware complication.        MACRO:   None.        Signed by: Milly Leiva 10/2/2024 4:09 PM   Dictation workstation:   [id number]      FL fluoro images no charge   Final Result      Transthoracic Echo (TTE) Limited   Final Result      Vascular US Lower Extremity Venous Duplex Bilateral   Final Result   No sonographic evidence for deep vein thrombosis within the evaluated   veins of the bilateral lower extremities.        I personally reviewed the images/study and I agree with the findings   as stated by Ely Rowe MD. This study was interpreted at   [place], [place].        MACRO:   None        Signed by: Arturo Arana 10/1/2024 5:46 PM   Dictation workstation:   [id number]      Transthoracic Echo (TTE) Complete   Final Result      XR hip left with pelvis when performed 2 or 3 views   Final Result   Left subcapital femoral fracture with mild impaction and   displacement. Lucency in the intertrochanteric region of the left   femur concerning for underlying osseous lesion.        I personally reviewed the images/study and I agree with the findings   as stated by Shruthi Vaca DO, PGY-2. This study was interpreted   at [place], [place].        MACRO:   None              Signed by: Pedro Leslie 9/30/2024 9:54 AM   Dictation workstation:   [id number]      XR chest 1 view   Final Result   1.  No evidence of acute cardiopulmonary process.        I personally reviewed the images/study and I agree with the findings   as stated by Shruthi Vaca DO, PGY-3. This study was interpreted   at [facility], [place], [place].        MACRO:   None        Signed by: Sesar Simons 9/30/2024 8:55 AM   Dictation workstation:   [id number]      XR femur left 2+ views   Final Result   Left subcapital femoral fracture with mild impaction and   displacement. Lucency in the intertrochanteric region of the left   femur concerning for underlying osseous lesion.        I personally reviewed the images/study and I agree with the findings   as stated by Shruthi Vaca DO, PGY-2. This study was interpreted   at [facility], [place], [place].        MACRO:   None             Signed by: Pedro Leslie 9/30/2024 9:54 AM   Dictation workstation:   [id number]          Assessment/Plan: 73 y.o. female w/ pathologic L FNF s/p L Hemiarthroplasty on 10/2 with Dr. Alcala.      Plan:   - Postop H/H 9.4/29.4   - Postoperative AP pelvis in PACU w/ well reduced implant   - Weight bearing: WBAT LLE no restrictions   - DVT ppx: SCDs, okay for DVT ppx today  - Diet: OK for diet from an Orthopaedic perspective   - Pain: multimodal pain control per primary   - Antibiotics: perioperative ancef 2g q8hr x3 doses  - Lines: maintain PIVx2 while inpatient  - Bowel Regimen: miralax  - PT/OT: consulted  - Pulm: Encourage IS, maintain O2 sat >92%  - Mata: none   - Drains: none     Dispo: Per Primary    We will follow peripherally while patient is in house. Pt should be weightbearing as tolerated until first follow up appointment. Patient will require 6 weeks total of DVT prophylaxis from an orthopedic standpoint, if ok per primary team. Patient currently  has mepilex x1 on surgical site. Dressing should be removed POD7. Please send home with Calcium/Vitamin D 500mg-400IU BID for 6 weeks. Patient should follow up w/ Dr. Alcala 2 weeks after surgery for post-operative appointment (patient may call [phone] to schedule). Please page with questions.      Ariel Dsouza MD, MD  Orthopedic Surgery PGY-2  [facility]  Available by Epic Chat    While inpatient, this patient will be followed by the Orthopaedic Trauma team. Please see contact information below:    Ortho Trauma  Ariel Dsouza MD PGY2  Cassandra Trujillo MD PGY3    Please page [phone] (ortho on-call) after 6pm and on weekends.

## 2024-10-03 NOTE — CARE PLAN
Problem: Fall/Injury  Goal: Not fall by end of shift  Outcome: Progressing  Goal: Be free from injury by end of the shift  Outcome: Progressing  Goal: Verbalize understanding of personal risk factors for fall in the hospital  Outcome: Progressing  Goal: Verbalize understanding of risk factor reduction measures to prevent injury from fall in the home  Outcome: Progressing  Goal: Use assistive devices by end of the shift  Outcome: Progressing  Goal: Pace activities to prevent fatigue by end of the shift  Outcome: Progressing     Problem: Pain - Adult  Goal: Verbalizes/displays adequate comfort level or baseline comfort level  Outcome: Progressing     Problem: Safety - Adult  Goal: Free from fall injury  Outcome: Progressing     Problem: Discharge Planning  Goal: Discharge to home or other facility with appropriate resources  Outcome: Progressing     Problem: Chronic Conditions and Co-morbidities  Goal: Patient's chronic conditions and co-morbidity symptoms are monitored and maintained or improved  Outcome: Progressing   The patient's goals for the shift include      The clinical goals for the shift include Patient will remain hemodynamically stable and free from fall/injury for entirety of shift.

## 2024-10-03 NOTE — PROGRESS NOTES
10/01/24 1200   Discharge Planning   Living Arrangements Spouse/significant other   Support Systems Spouse/significant other   Assistance Needed Needs TBD   Type of Residence Private residence   Number of Stairs to Enter Residence 1   Number of Stairs Within Residence 13   Do you have animals or pets at home? No   Home or Post Acute Services In home services;Post acute facilities (Rehab/SNF/etc)     Care Transitions Note  10/01/24  Status: Inpatient  Payor Source: [employer] [place] [employer]  Discharge Disposition: TBD, PT/OT to eval after surgery  ADOD: TBD  Previous SW assessment completed yesterday. Cardiology following for abnormal ECHO findings. Patient to receive repeat ECHO before ortho surgery. Scheduled for OR as last case today, possibly tomorrow. PT/OT to evaluate after surgery. Will follow.   MARGIE Jack     UPDATE 10/03/24  Patient had hip hemiarthroplasty yesterday. Per medical team, waiting for ortho recs to determine discharge plan. PT/OT to evaluate after surgery. Will follow for home going needs.  MARGIE Jack

## 2024-10-03 NOTE — PROGRESS NOTES
Physical Therapy    Physical Therapy Evaluation & Treatment    Patient Name: Allyson Armendariz  MRN: [id number]  Department: [facility]  Room: [facility]6011-A  Today's Date: 10/3/2024   Time Calculation  Start Time: 1420  Stop Time: 1443  Time Calculation (min): 23 min    Assessment/Plan   PT Assessment  PT Assessment Results: Decreased strength, Decreased endurance, Impaired balance, Decreased mobility  Rehab Prognosis: Good  End of Session Communication: Bedside nurse  Assessment Comment: Pt is a 73 y.o female who presents to PT s/p L hemiarthroplasty on 10/2.  Pt presents with decreased strength and anticipated decreased endurance, balance, and mobility.  Pt will benefit from skilled PT to address the above deficits and will benefit from further PT assessment for safe discharge recommendations.  End of Session Patient Position: Bed, 3 rail up, Alarm off, not on at start of session (RN at bedside)   IP OR SWING BED PT PLAN  Inpatient or Swing Bed: Inpatient  PT Plan  Treatment/Interventions: Bed mobility, Transfer training, Gait training, Stair training, Balance training, Neuromuscular re-education, Strengthening, Endurance training, Therapeutic exercise, Therapeutic activity, Home exercise program  PT Plan: Ongoing PT  PT Frequency: Daily  PT Discharge Recommendations:  (TBD pending further assessment)  Equipment Recommended upon Discharge:  (TBD)  PT - OK to Discharge:  (No)      Subjective     General Visit Information:  General  Reason for Referral: pathologic L FNF s/p L Hemiarthroplasty on 10/2 with Dr. Alcala  Past Medical History Relevant to Rehab: Per chart, PMH includes Chron's disease, pancreatic adenocarcinoma with c/f mets to the liver  Prior to Session Communication: Bedside nurse, Physician  Patient Position Received: Bed, 3 rail up, Alarm off, not on at start of session  General Comment: Pt cleared for PT by RN and cleared for mobility to EOB by Physician via secure chat with RN due to BP.  Home Living:  Home  Living  Type of Home: House  Lives With: Spouse  Home Adaptive Equipment: Wheelchair-manual (rollator, BSC.  Does not use AD at baseline)  Prior Level of Function:  Prior Function Per Pt/Caregiver Report  Level of [place]: Independent with ADLs and functional transfers, Independent with homemaking with ambulation  Prior Function Comments: -driving since cancer and chemo, -falls  Precautions:  Precautions  LE Weight Bearing Status: Weight Bearing as Tolerated  Medical Precautions: Fall precautions    Vital Signs (Past 2hrs)        Date/Time Vitals Session Patient Position Pulse Resp SpO2 BP MAP (mmHg)    10/03/24 1420 Pre PT  Lying  --  --  --  112/44  58     10/03/24 1435 During PT  Sitting  --  --  --  124/72  --            Objective   Pain:  Pain Assessment  Pain Assessment: 0-10  0-10 (Numeric) Pain Score: 1  Pain Type: Acute pain  Pain Location: Knee  Pain Orientation: Left  Pain Interventions: Repositioned, Rest (Ther ex)  Response to Interventions: no change in pain  Cognition:  Cognition  Overall Cognitive Status: Within Functional Limits  Orientation Level: Oriented X4    General Assessments:  Activity Tolerance  Endurance: Endurance does not limit participation in activity    Strength  Strength Comments: B LE strength grossly 4+/5 except L knee ext 4/5 and L hip flex 2+/5  Coordination  Movements are Fluid and Coordinated: Yes    Postural Control  Postural Control: Within Functional Limits    Static Sitting Balance  Static Sitting-Balance Support: Bilateral upper extremity supported, Feet supported  Static Sitting-Level of Assistance: Close supervision  Dynamic Sitting Balance  Dynamic Sitting-Balance Support: Bilateral upper extremity supported, Feet supported  Dynamic Sitting-Level of Assistance: Close supervision  Dynamic Sitting-Balance:  (LE ther ex)  Functional Assessments:  Bed Mobility  Bed Mobility: Yes  Bed Mobility 1  Bed Mobility 1: Supine to sitting, Sitting to supine  Level of Assistance  "1: Minimum assistance (L LE management)  Bed Mobility 2  Bed Mobility  2: Scooting (to HOB in supine)  Level of Assistance 2: Dependent, +2    Transfers  Transfer: No (deferred due to BP per physician)    Ambulation/Gait Training  Ambulation/Gait Training Performed: No (deferred due to BP per physician)  Treatments:  Therapeutic Exercise  Therapeutic Exercise Performed: Yes  Therapeutic Exercise Activity 1: seated LAQx3\" hold, seated marches, supine ankle pumps, heel slides, quad sets x3\" hold, and hip abd/add x10 each LE  Outcome Measures:  [facility] Basic Mobility  Turning from your back to your side while in a flat bed without using bedrails: A little  Moving from lying on your back to sitting on the side of a flat bed without using bedrails: A little  Moving to and from bed to chair (including a wheelchair): A little  Standing up from a chair using your arms (e.g. wheelchair or bedside chair): Total  To walk in hospital room: Total  Climbing 3-5 steps with railing: Total  Basic Mobility - Total Score: 12    Encounter Problems       Encounter Problems (Active)       Balance       Pt will maintain static/dynamic standing balance x4 minutes with RW and modified independence to demonstrate improved balance       Start:  10/03/24    Expected End:  10/17/24               Mobility       Pt will complete bed mobility independently with HOB flat and no use of bed rails       Start:  10/03/24    Expected End:  10/17/24            Pt will amb >150ft with RW and modified independence in prep for safe discharge home        Start:  10/03/24    Expected End:  10/17/24            Pt will a/descend 12 steps with 1 rail and modified independence in prep for safe home entry/to access bathroom       Start:  10/03/24    Expected End:  10/17/24               PT Transfers       Pt will transfer sit to stand with RW and modified independence in prep for out of bed mobility        Start:  10/03/24    Expected End:  10/17/24             "       Education Documentation  Home Exercise Program, taught by Krysta Reed V PT at 10/3/2024  3:08 PM.  Learner: Patient  Readiness: Acceptance  Method: Explanation  Response: Verbalizes Understanding    Mobility Training, taught by Krysta Reed V PT at 10/3/2024  3:08 PM.  Learner: Patient  Readiness: Acceptance  Method: Explanation  Response: Verbalizes Understanding    Education Comments  No comments found.

## 2024-10-04 LAB
ALBUMIN SERPL BCP-MCNC: 2.1 G/DL (ref 3.4–5)
ALBUMIN SERPL BCP-MCNC: 2.1 G/DL (ref 3.4–5)
ANION GAP SERPL CALC-SCNC: 13 MMOL/L (ref 10–20)
ANION GAP SERPL CALC-SCNC: 13 MMOL/L (ref 10–20)
APPEARANCE UR: ABNORMAL
APPEARANCE UR: ABNORMAL
BASOPHILS # BLD AUTO: 0.01 X10*3/UL (ref 0–0.1)
BASOPHILS # BLD AUTO: 0.01 X10*3/UL (ref 0–0.1)
BASOPHILS NFR BLD AUTO: 0.1 %
BASOPHILS NFR BLD AUTO: 0.1 %
BILIRUB UR STRIP.AUTO-MCNC: NEGATIVE MG/DL
BILIRUB UR STRIP.AUTO-MCNC: NEGATIVE MG/DL
BUN SERPL-MCNC: 49 MG/DL (ref 6–23)
BUN SERPL-MCNC: 49 MG/DL (ref 6–23)
CALCIUM SERPL-MCNC: 7.7 MG/DL (ref 8.6–10.6)
CALCIUM SERPL-MCNC: 7.7 MG/DL (ref 8.6–10.6)
CHLORIDE SERPL-SCNC: 100 MMOL/L (ref 98–107)
CHLORIDE SERPL-SCNC: 100 MMOL/L (ref 98–107)
CO2 SERPL-SCNC: 25 MMOL/L (ref 21–32)
CO2 SERPL-SCNC: 25 MMOL/L (ref 21–32)
COLOR UR: YELLOW
COLOR UR: YELLOW
CREAT SERPL-MCNC: 2.1 MG/DL (ref 0.5–1.05)
CREAT SERPL-MCNC: 2.1 MG/DL (ref 0.5–1.05)
EGFRCR SERPLBLD CKD-EPI 2021: 24 ML/MIN/1.73M*2
EGFRCR SERPLBLD CKD-EPI 2021: 24 ML/MIN/1.73M*2
EOSINOPHIL # BLD AUTO: 0.09 X10*3/UL (ref 0–0.4)
EOSINOPHIL # BLD AUTO: 0.09 X10*3/UL (ref 0–0.4)
EOSINOPHIL NFR BLD AUTO: 0.8 %
EOSINOPHIL NFR BLD AUTO: 0.8 %
ERYTHROCYTE [DISTWIDTH] IN BLOOD BY AUTOMATED COUNT: 16.7 % (ref 11.5–14.5)
ERYTHROCYTE [DISTWIDTH] IN BLOOD BY AUTOMATED COUNT: 16.7 % (ref 11.5–14.5)
GLUCOSE SERPL-MCNC: 110 MG/DL (ref 74–99)
GLUCOSE SERPL-MCNC: 110 MG/DL (ref 74–99)
GLUCOSE UR STRIP.AUTO-MCNC: NORMAL MG/DL
GLUCOSE UR STRIP.AUTO-MCNC: NORMAL MG/DL
HCT VFR BLD AUTO: 29.1 % (ref 36–46)
HCT VFR BLD AUTO: 29.1 % (ref 36–46)
HGB BLD-MCNC: 9.5 G/DL (ref 12–16)
HGB BLD-MCNC: 9.5 G/DL (ref 12–16)
IMM GRANULOCYTES # BLD AUTO: 0.07 X10*3/UL (ref 0–0.5)
IMM GRANULOCYTES # BLD AUTO: 0.07 X10*3/UL (ref 0–0.5)
IMM GRANULOCYTES NFR BLD AUTO: 0.6 % (ref 0–0.9)
IMM GRANULOCYTES NFR BLD AUTO: 0.6 % (ref 0–0.9)
KETONES UR STRIP.AUTO-MCNC: NEGATIVE MG/DL
KETONES UR STRIP.AUTO-MCNC: NEGATIVE MG/DL
LEUKOCYTE ESTERASE UR QL STRIP.AUTO: ABNORMAL
LEUKOCYTE ESTERASE UR QL STRIP.AUTO: ABNORMAL
LYMPHOCYTES # BLD AUTO: 1.12 X10*3/UL (ref 0.8–3)
LYMPHOCYTES # BLD AUTO: 1.12 X10*3/UL (ref 0.8–3)
LYMPHOCYTES NFR BLD AUTO: 10.2 %
LYMPHOCYTES NFR BLD AUTO: 10.2 %
MAGNESIUM SERPL-MCNC: 1.92 MG/DL (ref 1.6–2.4)
MAGNESIUM SERPL-MCNC: 1.92 MG/DL (ref 1.6–2.4)
MCH RBC QN AUTO: 30.4 PG (ref 26–34)
MCH RBC QN AUTO: 30.4 PG (ref 26–34)
MCHC RBC AUTO-ENTMCNC: 32.6 G/DL (ref 32–36)
MCHC RBC AUTO-ENTMCNC: 32.6 G/DL (ref 32–36)
MCV RBC AUTO: 93 FL (ref 80–100)
MCV RBC AUTO: 93 FL (ref 80–100)
MONOCYTES # BLD AUTO: 1.07 X10*3/UL (ref 0.05–0.8)
MONOCYTES # BLD AUTO: 1.07 X10*3/UL (ref 0.05–0.8)
MONOCYTES NFR BLD AUTO: 9.7 %
MONOCYTES NFR BLD AUTO: 9.7 %
NEUTROPHILS # BLD AUTO: 8.63 X10*3/UL (ref 1.6–5.5)
NEUTROPHILS # BLD AUTO: 8.63 X10*3/UL (ref 1.6–5.5)
NEUTROPHILS NFR BLD AUTO: 78.6 %
NEUTROPHILS NFR BLD AUTO: 78.6 %
NITRITE UR QL STRIP.AUTO: NEGATIVE
NITRITE UR QL STRIP.AUTO: NEGATIVE
NRBC BLD-RTO: 0 /100 WBCS (ref 0–0)
NRBC BLD-RTO: 0 /100 WBCS (ref 0–0)
PH UR STRIP.AUTO: 6 [PH]
PH UR STRIP.AUTO: 6 [PH]
PHOSPHATE SERPL-MCNC: 3.7 MG/DL (ref 2.5–4.9)
PHOSPHATE SERPL-MCNC: 3.7 MG/DL (ref 2.5–4.9)
PLATELET # BLD AUTO: 212 X10*3/UL (ref 150–450)
PLATELET # BLD AUTO: 212 X10*3/UL (ref 150–450)
POTASSIUM SERPL-SCNC: 4.7 MMOL/L (ref 3.5–5.3)
POTASSIUM SERPL-SCNC: 4.7 MMOL/L (ref 3.5–5.3)
PROT UR STRIP.AUTO-MCNC: ABNORMAL MG/DL
PROT UR STRIP.AUTO-MCNC: ABNORMAL MG/DL
RBC # BLD AUTO: 3.12 X10*6/UL (ref 4–5.2)
RBC # BLD AUTO: 3.12 X10*6/UL (ref 4–5.2)
RBC # UR STRIP.AUTO: ABNORMAL /UL
RBC # UR STRIP.AUTO: ABNORMAL /UL
RBC #/AREA URNS AUTO: ABNORMAL /HPF
RBC #/AREA URNS AUTO: ABNORMAL /HPF
SODIUM SERPL-SCNC: 133 MMOL/L (ref 136–145)
SODIUM SERPL-SCNC: 133 MMOL/L (ref 136–145)
SP GR UR STRIP.AUTO: 1.02
SP GR UR STRIP.AUTO: 1.02
SQUAMOUS #/AREA URNS AUTO: ABNORMAL /HPF
SQUAMOUS #/AREA URNS AUTO: ABNORMAL /HPF
UROBILINOGEN UR STRIP.AUTO-MCNC: NORMAL MG/DL
UROBILINOGEN UR STRIP.AUTO-MCNC: NORMAL MG/DL
WBC # BLD AUTO: 11 X10*3/UL (ref 4.4–11.3)
WBC # BLD AUTO: 11 X10*3/UL (ref 4.4–11.3)
WBC #/AREA URNS AUTO: ABNORMAL /HPF
WBC #/AREA URNS AUTO: ABNORMAL /HPF

## 2024-10-04 PROCEDURE — 83735 ASSAY OF MAGNESIUM: CPT

## 2024-10-04 PROCEDURE — 97165 OT EVAL LOW COMPLEX 30 MIN: CPT | Mod: GO

## 2024-10-04 PROCEDURE — 99233 SBSQ HOSP IP/OBS HIGH 50: CPT

## 2024-10-04 PROCEDURE — 85025 COMPLETE CBC W/AUTO DIFF WBC: CPT

## 2024-10-04 PROCEDURE — 97530 THERAPEUTIC ACTIVITIES: CPT | Mod: GO

## 2024-10-04 PROCEDURE — 1170000001 HC PRIVATE ONCOLOGY ROOM DAILY

## 2024-10-04 PROCEDURE — 2500000004 HC RX 250 GENERAL PHARMACY W/ HCPCS (ALT 636 FOR OP/ED)

## 2024-10-04 PROCEDURE — 2500000001 HC RX 250 WO HCPCS SELF ADMINISTERED DRUGS (ALT 637 FOR MEDICARE OP)

## 2024-10-04 PROCEDURE — 87086 URINE CULTURE/COLONY COUNT: CPT

## 2024-10-04 PROCEDURE — 80069 RENAL FUNCTION PANEL: CPT

## 2024-10-04 PROCEDURE — 2500000004 HC RX 250 GENERAL PHARMACY W/ HCPCS (ALT 636 FOR OP/ED): Mod: JZ

## 2024-10-04 PROCEDURE — 81001 URINALYSIS AUTO W/SCOPE: CPT

## 2024-10-04 PROCEDURE — 97530 THERAPEUTIC ACTIVITIES: CPT | Mod: GP

## 2024-10-04 RX ORDER — SENNOSIDES 8.6 MG/1
2 TABLET ORAL NIGHTLY
Status: DISPENSED | OUTPATIENT
Start: 2024-10-04

## 2024-10-04 RX ORDER — HEPARIN SODIUM 5000 [USP'U]/ML
5000 INJECTION, SOLUTION INTRAVENOUS; SUBCUTANEOUS EVERY 8 HOURS SCHEDULED
Status: DISPENSED | OUTPATIENT
Start: 2024-10-04

## 2024-10-04 RX ADMIN — OXYCODONE HYDROCHLORIDE 10 MG: 5 TABLET ORAL at 14:28

## 2024-10-04 RX ADMIN — OXYCODONE HYDROCHLORIDE 10 MG: 5 TABLET ORAL at 02:14

## 2024-10-04 RX ADMIN — CYANOCOBALAMIN TAB 1000 MCG 1000 MCG: 1000 TAB at 08:47

## 2024-10-04 RX ADMIN — POLYETHYLENE GLYCOL 3350 17 G: 17 POWDER, FOR SOLUTION ORAL at 08:47

## 2024-10-04 RX ADMIN — CEFAZOLIN SODIUM 2 G: 2 INJECTION, SOLUTION INTRAVENOUS at 00:14

## 2024-10-04 RX ADMIN — OXYCODONE HYDROCHLORIDE 10 MG: 5 TABLET ORAL at 10:18

## 2024-10-04 RX ADMIN — OXYCODONE HYDROCHLORIDE 10 MG: 5 TABLET ORAL at 21:27

## 2024-10-04 RX ADMIN — PANTOPRAZOLE SODIUM 40 MG: 40 TABLET, DELAYED RELEASE ORAL at 08:47

## 2024-10-04 RX ADMIN — HEPARIN SODIUM 5000 UNITS: 5000 INJECTION, SOLUTION INTRAVENOUS; SUBCUTANEOUS at 14:28

## 2024-10-04 RX ADMIN — GABAPENTIN 300 MG: 300 CAPSULE ORAL at 14:28

## 2024-10-04 RX ADMIN — DULOXETINE HYDROCHLORIDE 30 MG: 30 CAPSULE, DELAYED RELEASE ORAL at 08:47

## 2024-10-04 RX ADMIN — PANCRELIPASE 2 CAPSULE: 120000; 24000; 76000 CAPSULE, DELAYED RELEASE PELLETS ORAL at 11:20

## 2024-10-04 RX ADMIN — SENNOSIDES 17.2 MG: 8.6 TABLET, FILM COATED ORAL at 21:28

## 2024-10-04 RX ADMIN — PANCRELIPASE 2 CAPSULE: 120000; 24000; 76000 CAPSULE, DELAYED RELEASE PELLETS ORAL at 16:48

## 2024-10-04 RX ADMIN — PANCRELIPASE 2 CAPSULE: 120000; 24000; 76000 CAPSULE, DELAYED RELEASE PELLETS ORAL at 08:47

## 2024-10-04 RX ADMIN — GABAPENTIN 300 MG: 300 CAPSULE ORAL at 21:28

## 2024-10-04 RX ADMIN — HEPARIN SODIUM 5000 UNITS: 5000 INJECTION, SOLUTION INTRAVENOUS; SUBCUTANEOUS at 21:28

## 2024-10-04 RX ADMIN — GABAPENTIN 300 MG: 300 CAPSULE ORAL at 08:47

## 2024-10-04 RX ADMIN — METOPROLOL TARTRATE 50 MG: 50 TABLET, FILM COATED ORAL at 11:19

## 2024-10-04 ASSESSMENT — COGNITIVE AND FUNCTIONAL STATUS - GENERAL
MOBILITY SCORE: 17
WALKING IN HOSPITAL ROOM: A LOT
PERSONAL GROOMING: A LITTLE
DRESSING REGULAR LOWER BODY CLOTHING: A LOT
HELP NEEDED FOR BATHING: A LITTLE
MOBILITY SCORE: 14
CLIMB 3 TO 5 STEPS WITH RAILING: TOTAL
DAILY ACTIVITIY SCORE: 18
MOBILITY SCORE: 14
WALKING IN HOSPITAL ROOM: A LITTLE
HELP NEEDED FOR BATHING: A LITTLE
DAILY ACTIVITIY SCORE: 19
WALKING IN HOSPITAL ROOM: A LOT
TURNING FROM BACK TO SIDE WHILE IN FLAT BAD: A LITTLE
MOVING TO AND FROM BED TO CHAIR: A LITTLE
TOILETING: A LITTLE
TURNING FROM BACK TO SIDE WHILE IN FLAT BAD: A LITTLE
CLIMB 3 TO 5 STEPS WITH RAILING: TOTAL
DRESSING REGULAR LOWER BODY CLOTHING: A LITTLE
DRESSING REGULAR UPPER BODY CLOTHING: A LITTLE
STANDING UP FROM CHAIR USING ARMS: A LITTLE
DAILY ACTIVITIY SCORE: 18
DRESSING REGULAR LOWER BODY CLOTHING: A LOT
STANDING UP FROM CHAIR USING ARMS: A LOT
PERSONAL GROOMING: A LITTLE
DRESSING REGULAR UPPER BODY CLOTHING: A LITTLE
PERSONAL GROOMING: A LITTLE
TOILETING: A LITTLE
MOVING FROM LYING ON BACK TO SITTING ON SIDE OF FLAT BED WITH BEDRAILS: A LITTLE
DRESSING REGULAR UPPER BODY CLOTHING: A LITTLE
TURNING FROM BACK TO SIDE WHILE IN FLAT BAD: A LITTLE
STANDING UP FROM CHAIR USING ARMS: A LOT
MOVING TO AND FROM BED TO CHAIR: A LOT
TOILETING: A LITTLE
MOVING TO AND FROM BED TO CHAIR: A LOT
CLIMB 3 TO 5 STEPS WITH RAILING: A LOT
HELP NEEDED FOR BATHING: A LITTLE

## 2024-10-04 ASSESSMENT — PAIN - FUNCTIONAL ASSESSMENT
PAIN_FUNCTIONAL_ASSESSMENT: 0-10

## 2024-10-04 ASSESSMENT — PAIN SCALES - GENERAL
PAINLEVEL_OUTOF10: 3
PAINLEVEL_OUTOF10: 7
PAINLEVEL_OUTOF10: 6
PAINLEVEL_OUTOF10: 0 - NO PAIN
PAINLEVEL_OUTOF10: 7
PAINLEVEL_OUTOF10: 6
PAINLEVEL_OUTOF10: 2
PAINLEVEL_OUTOF10: 3
PAINLEVEL_OUTOF10: 5 - MODERATE PAIN
PAINLEVEL_OUTOF10: 7

## 2024-10-04 ASSESSMENT — PAIN DESCRIPTION - LOCATION
LOCATION: HIP
LOCATION: HIP

## 2024-10-04 ASSESSMENT — ACTIVITIES OF DAILY LIVING (ADL): BATHING_ASSISTANCE: MINIMAL

## 2024-10-04 NOTE — PROGRESS NOTES
Occupational Therapy    Evaluation/Treatment    Patient Name: Allyson Armendariz  MRN: [id number]  Department: [facility]  Room: 60/6011-A  Today's Date: 10/04/24  Time Calculation  Start Time: 0900  Stop Time: 0937  Time Calculation (min): 37 min       Assessment:  OT Assessment: difficulty I/ADLS, safety, fxnl mob  Prognosis: Good  Barriers to Discharge: None  Evaluation/Treatment Tolerance: Patient tolerated treatment well  Medical Staff Made Aware: Yes  End of Session Communication: Bedside nurse  End of Session Patient Position: Up in chair, Alarm on  OT Assessment Results: Decreased ADL status, Decreased endurance, Decreased functional mobility, Decreased gross motor control, Decreased IADLs  Prognosis: Good  Barriers to Discharge: None  Evaluation/Treatment Tolerance: Patient tolerated treatment well  Medical Staff Made Aware: Yes  Strengths: Attitude of self, Ability to acquire knowledge, Support of Caregivers  Barriers to Participation:  (none)  Plan:  Treatment Interventions: ADL retraining, Functional transfer training, UE strengthening/ROM, Endurance training, Patient/family training, Equipment evaluation/education, Compensatory technique education  OT Frequency: 2 times per week  OT Discharge Recommendations: Low intensity level of continued care  Equipment Recommended upon Discharge:  (WhW, hip kit, tub bench)  OT Recommended Transfer Status: Assist of 1  OT - OK to Discharge: Yes  Treatment Interventions: ADL retraining, Functional transfer training, UE strengthening/ROM, Endurance training, Patient/family training, Equipment evaluation/education, Compensatory technique education    Subjective   Current Problem:  1. Systolic murmur  Transthoracic Echo (TTE) Complete    Transthoracic Echo (TTE) Complete    Transthoracic Echo (TTE) Limited    Transthoracic Echo (TTE) Limited    CANCELED: Transthoracic Echo (TTE) Limited    CANCELED: Transthoracic Echo (TTE) Limited    CANCELED: Transthoracic Echo (TTE) Complete     CANCELED: Transthoracic Echo (TTE) Complete      2. History of hysterectomy  Transthoracic Echo (TTE) Limited    Transthoracic Echo (TTE) Limited      3. Crohn's disease with complication, unspecified gastrointestinal tract location (Multi)  Transthoracic Echo (TTE) Limited    Transthoracic Echo (TTE) Limited      4. Pancreatic cancer (Multi)  Transthoracic Echo (TTE) Limited    Transthoracic Echo (TTE) Limited      5. Left displaced femoral neck fracture (Multi)  Transthoracic Echo (TTE) Limited    Transthoracic Echo (TTE) Limited    Case Request Operating Room: Hip Hemiarthroplasty    Case Request Operating Room: Hip Hemiarthroplasty    Vascular US Lower Extremity Venous Duplex Bilateral    Vascular US Lower Extremity Venous Duplex Bilateral    Surgical Pathology Exam    Surgical Pathology Exam    CANCELED: Case Request Operating Room: Hip Hemiarthroplasty, Arthroplasty Total Hip    CANCELED: Case Request Operating Room: Hip Hemiarthroplasty, Arthroplasty Total Hip      6. Swelling of calf  Vascular US Lower Extremity Venous Duplex Bilateral    Vascular US Lower Extremity Venous Duplex Bilateral        General:   OT Received On: 10/04/24  General  Reason for Referral: pathologic L FNF s/p L Hemiarthroplasty  Past Medical History Relevant to Rehab: Chron's disease, pancreatic adenocarcinoma with c/f mets to the liver presenting as a transfer from [facility] for onc ortho eval of L complete pathologic femur fracture. Pt reports she has been having L leg pain since May  Family/Caregiver Present: No  Prior to Session Communication: Bedside nurse  Patient Position Received: Bed, 3 rail up, Alarm off, not on at start of session  General Comment: pleasant, cooperative  Precautions:  LE Weight Bearing Status:  (WBAT LLE no restrictions per emr)  Medical Precautions: Fall precautions    Vital Sign (Past 2hrs)        Date/Time Vitals Session Patient Position Pulse Resp SpO2 BP MAP (mmHg)    10/04/24 0900 During OT  --  83  --   99 %  148/86  --                Vital Signs Comment: seated chair /84 HR 88     Pain:  Pain Assessment  Pain Assessment: 0-10  0-10 (Numeric) Pain Score: 6  Pain Location:  (L knee)    Objective   Cognition:  Overall Cognitive Status: Within Functional Limits  Orientation Level: Oriented X4  Insight: Within function limits           Home Living:  Type of Home: House  Lives With: Spouse (A as needed)  Home Adaptive Equipment:  (Wheel chair, rollator, BSC)  Home Layout:  (1 MARKO bed 1st floor with BSC)  Bathroom Shower/Tub: Tub/shower unit  Bathroom Toilet: Standard  Prior Function:  Level of [place]: Independent with ADLs and functional transfers, Independent with homemaking with ambulation  Vocational: Retired  Leisure: family, travel  Hand Dominance: Right  IADL History:  IADL Comments: I I/ADLs A driving, - falls, - pets  ADL:  Eating Assistance: Independent  Grooming Assistance:  (CGA anticipated standing WHW)  Bathing Assistance: Minimal (anticipated)  UE Dressing Assistance: Minimal  LE Dressing Assistance: Minimal (anticipated)  Toileting Assistance with Device:  (min A anticiapted WhW)  Functional Deficit: Setup, Steadying, Verbal cueing, Supervision/safety, Increased time to complete  Activities of Daily Living:         UE Dressing  UE Dressing Level of Assistance:  (min A Adjust gown standing)    LE Dressing  LE Dressing:  (OT educated and demo'd LBD and bathing with AE as needed or A family)       Activity Tolerance:  Endurance:  (good)       Bed Mobility/Transfers: Bed Mobility  Bed Mobility:  (sup to sit min A)    Transfer 1  Transfer Level of Assistance 1:  (sit/stand CGA WHW)      Functional Mobility:  Functional Mobility  Functional Mobility Performed:  (pt performed fxnl mob bed to chair with rotation min-CGA WhW)  Sitting Balance:  Dynamic Sitting Balance  Dynamic Sitting-Level of Assistance: Independent  Standing Balance:  Dynamic Standing Balance  Dynamic Standing-Level of Assistance:   (CGA WHW)    Therapy/Activity: Therapeutic Activity  Therapeutic Activity Performed:  (OT provided education and demo hip prec pain management  fxnl mob I/ADLS, handouts provided)     Vision:Vision - Basic Assessment  Current Vision: Wears glasses all the time  Sensation:  Light Touch: No apparent deficits  Strength:  Strength Comments: BUE 4+/5  WFL       Coordination:  Movements are Fluid and Coordinated: Yes   Hand Function:  Hand Function  Gross Grasp: Functional  Extremities: RUE   RUE : Within Functional Limits and LUE   LUE: Within Functional Limits      Outcome Measures: [facility] Daily Activity  Putting on and taking off regular lower body clothing: A little  Bathing (including washing, rinsing, drying): A little  Putting on and taking off regular upper body clothing: A little  Toileting, which includes using toilet, bedpan or urinal: A little  Taking care of personal grooming such as brushing teeth: A little  Eating Meals: None  Daily Activity - Total Score: 19         and OT Adult Other Outcome Measures  4AT: -    Education Documentation  Handouts, taught by Michelle Bailey OT at 10/4/2024 10:56 AM.  Learner: Patient  Readiness: Acceptance  Method: Explanation, Demonstration, Handout  Response: Verbalizes Understanding, Needs Reinforcement    Body Mechanics, taught by Michelle Bailey OT at 10/4/2024 10:56 AM.  Learner: Patient  Readiness: Acceptance  Method: Explanation, Demonstration, Handout  Response: Verbalizes Understanding, Needs Reinforcement    Precautions, taught by Michelle Bailey OT at 10/4/2024 10:56 AM.  Learner: Patient  Readiness: Acceptance  Method: Explanation, Demonstration, Handout  Response: Verbalizes Understanding, Needs Reinforcement    ADL Training, taught by Michelle Bailey OT at 10/4/2024 10:56 AM.  Learner: Patient  Readiness: Acceptance  Method: Explanation, Demonstration, Handout  Response: Verbalizes Understanding, Needs Reinforcement    Education Comments  No  comments found.          Goals:  Encounter Problems       Encounter Problems (Active)       ADLs       Patient will perform UB and LB bathing  with modified independent level of assistance and AE. (Progressing)       Start:  10/04/24    Expected End:  10/25/24            Patient with complete upper body dressing with independent level  (Progressing)       Start:  10/04/24    Expected End:  10/25/24            Patient with complete lower body dressing with modified independent level of assistance donning and doffing all LE clothes  with PRN adaptive equipment  (Progressing)       Start:  10/04/24    Expected End:  10/25/24            Patient will complete daily grooming tasks  with independent level  (Progressing)       Start:  10/04/24    Expected End:  10/25/24            Patient will complete toileting including hygiene clothing management/hygiene with modified independent level of assistance and LRD. (Progressing)       Start:  10/04/24    Expected End:  10/25/24               EXERCISE/STRENGTHENING       Patient with increase BUE to WFL strength. (Progressing)       Start:  10/04/24    Expected End:  10/25/24               MOBILITY       Patient will perform Functional mobility max Household distances/Community Distances with modified independent level of assistance and least restrictive device in order to improve safety and functional mobility. (Progressing)       Start:  10/04/24    Expected End:  10/25/24               TRANSFERS       Patient will perform bed mobility stand by assist level of assistance and bed rails in order to improve safety and independence with mobility (Progressing)       Start:  10/04/24    Expected End:  10/25/24            Patient will complete functional transfer  least restrictive device with modified independent level of assistance. (Progressing)       Start:  10/04/24    Expected End:  10/25/24

## 2024-10-04 NOTE — PROGRESS NOTES
10/01/24 1200   Discharge Planning   Living Arrangements Spouse/significant other   Support Systems Spouse/significant other   Assistance Needed Needs TBD   Type of Residence Private residence   Number of Stairs to Enter Residence 1   Number of Stairs Within Residence 13   Do you have animals or pets at home? No   Home or Post Acute Services In home services;Post acute facilities (Rehab/SNF/etc)     Care Transitions Note  10/01/24  Status: Inpatient  Payor Source: [facility] Medicare  Discharge Disposition: TBD, PT/OT to eval after surgery  ADOD: TBD  Previous SW assessment completed yesterday. Cardiology following for abnormal ECHO findings. Patient to receive repeat ECHO before ortho surgery. Scheduled for OR as last case today, possibly tomorrow. PT/OT to evaluate after surgery. Will follow.   MARGIE Jack     UPDATE 10/03/24  Patient had hip hemiarthroplasty yesterday. Per medical team, waiting for ortho recs to determine discharge plan. PT/OT to evaluate after surgery. Will follow for home going needs.  MARGIE Jack    UPDATE 10/04/24  Patient post-op. Per team, watching hemoglobin and JETHRO. PT/OT to eval and make recs for dispo. LSW/TCC following.   MARGIE Jack

## 2024-10-04 NOTE — CARE PLAN
Problem: Fall/Injury  Goal: Not fall by end of shift  Outcome: Progressing  Goal: Be free from injury by end of the shift  Outcome: Progressing  Goal: Verbalize understanding of personal risk factors for fall in the hospital  Outcome: Progressing  Goal: Verbalize understanding of risk factor reduction measures to prevent injury from fall in the home  Outcome: Progressing  Goal: Use assistive devices by end of the shift  Outcome: Progressing  Goal: Pace activities to prevent fatigue by end of the shift  Outcome: Progressing     Problem: Pain - Adult  Goal: Verbalizes/displays adequate comfort level or baseline comfort level  Outcome: Progressing     Problem: Safety - Adult  Goal: Free from fall injury  Outcome: Progressing     Problem: Discharge Planning  Goal: Discharge to home or other facility with appropriate resources  Outcome: Progressing     Problem: Chronic Conditions and Co-morbidities  Goal: Patient's chronic conditions and co-morbidity symptoms are monitored and maintained or improved  Outcome: Progressing     Problem: Skin  Goal: Decreased wound size/increased tissue granulation at next dressing change  Outcome: Progressing  Goal: Participates in plan/prevention/treatment measures  Outcome: Progressing  Goal: Prevent/manage excess moisture  Outcome: Progressing  Goal: Prevent/minimize sheer/friction injuries  Outcome: Progressing  Goal: Promote/optimize nutrition  Outcome: Progressing  Goal: Promote skin healing  Outcome: Progressing   The patient's goals for the shift include      The clinical goals for the shift include Patient will remain hemodynamically stable and free from fall/injury for entirety of shift.

## 2024-10-04 NOTE — PROGRESS NOTES
Physical Therapy    Physical Therapy Treatment    Patient Name: Allyson Armendariz  MRN: [id number]  Department: [facility]  Room: [address] [place] [address]  Today's Date: 10/4/2024  Time Calculation  Start Time: 0954  Stop Time: 1012  Time Calculation (min): 18 min         Assessment/Plan   PT Assessment  PT Assessment Results: Decreased strength, Decreased endurance, Impaired balance, Decreased mobility  Rehab Prognosis: Good  End of Session Communication: Bedside nurse  Assessment Comment: Pt with SOB, increased pain, and fatigue with short distance ambulation this session.  Upon return to room, SpO2 100%.  Pt also c/o dizziness after amb, /74(88) in R LE. Pt in too much pain to complete ther ex this session.  Pt continues to benefit from skilled PT and would benefit from high intensity PT upon discharge however upon discussion, pt requesting low intensity PT upon discharge.  End of Session Patient Position: Up in chair, Alarm on  PT Plan  Inpatient/Swing Bed or Outpatient: Inpatient  PT Plan  Treatment/Interventions: Bed mobility, Transfer training, Gait training, Stair training, Balance training, Neuromuscular re-education, Strengthening, Endurance training, Therapeutic exercise, Therapeutic activity, Home exercise program  PT Plan: Ongoing PT  PT Frequency: Daily  PT Discharge Recommendations: Low intensity level of continued care (pt would benefit from high intensity but is requesting low intensity PT)  Equipment Recommended upon Discharge: Wheeled walker  PT Recommended Transfer Status: Assistive device, Stand by assist  PT - OK to Discharge: Yes (eval complete and discharge recommendations made)      General Visit Information:   PT  Visit  PT Received On: 10/04/24  General  Reason for Referral: pathologic L FNF s/p L Hemiarthroplasty on 10/2 with Dr. Alcala  Past Medical History Relevant to Rehab: Per chart, PMH includes Chron's disease, pancreatic adenocarcinoma with c/f mets to the liver  Prior to Session Communication:  Bedside nurse  Patient Position Received: Up in chair, Alarm on  General Comment: Pt cleared for PT by RN.  Pt alert and agreeable to PT.    Subjective   Precautions:  Precautions  LE Weight Bearing Status: Weight Bearing as Tolerated  Medical Precautions: Fall precautions    Vital Signs (Past 2hrs)        Date/Time Vitals Session Patient Position Pulse Resp SpO2 BP MAP (mmHg)    10/04/24 0954 Pre PT  Sitting  87  --  --  140/52  75     10/04/24 1001 During PT  Sitting  114  --  --  129/74  88             Objective   Pain:  Pain Assessment  Pain Assessment: 0-10  0-10 (Numeric) Pain Score: 6  Pain Type: Acute pain  Pain Location: Knee  Pain Orientation: Left  Pain Interventions: Ambulation/increased activity, Repositioned, Rest  Response to Interventions: pain increased with amb, decreased with seated rest  Cognition:  Cognition  Overall Cognitive Status: Within Functional Limits  Orientation Level: Oriented X4  Coordination:  Movements are Fluid and Coordinated: Yes  Postural Control:  Postural Control  Postural Control: Within Functional Limits  Static Sitting Balance  Static Sitting-Balance Support: Bilateral upper extremity supported, Feet supported  Static Sitting-Level of Assistance: Close supervision  Dynamic Sitting Balance  Dynamic Sitting-Balance Support: Bilateral upper extremity supported, Feet supported  Dynamic Sitting-Level of Assistance: Close supervision  Dynamic Sitting-Balance:  (LE ther ex)  Static Standing Balance  Static Standing-Balance Support: Bilateral upper extremity supported (RW)  Static Standing-Level of Assistance: Close supervision  Dynamic Standing Balance  Dynamic Standing-Balance Support: Bilateral upper extremity supported (RW)  Dynamic Standing-Level of Assistance: Close supervision  Dynamic Standing-Balance: Turning  Activity Tolerance:  Activity Tolerance  Endurance: Endurance does not limit participation in activity  Treatments:  Therapeutic Activity  Therapeutic Activity  Performed: Yes  Therapeutic Activity 1: transfers, pt edu, vital sign monitoring, short distance amb    Bed Mobility  Bed Mobility: No (pt up in chair at start and end of session)  Bed Mobility 1  Bed Mobility 1: Supine to sitting, Sitting to supine  Level of Assistance 1: Minimum assistance (L LE management)  Bed Mobility 2  Bed Mobility  2: Scooting (to HOB in supine)  Level of Assistance 2: Dependent, +2    Ambulation/Gait Training  Ambulation/Gait Training Performed: Yes  Ambulation/Gait Training 1  Surface 1: Level tile  Device 1: Rolling walker  Assistance 1: Close supervision  Quality of Gait 1: Narrow base of support, Decreased step length, Inconsistent stride length, Diminished heel strike, Forward flexed posture, Shuffling gait  Comments/Distance (ft) 1: 64ft  Transfers  Transfer: Yes  Transfer 1  Transfer From 1: Chair with arms to  Transfer to 1: Stand  Technique 1: Sit to stand, Stand to sit  Transfer Device 1: Walker  Transfer Level of Assistance 1: Contact guard  Trials/Comments 1: stand to sit with close supervision    Stairs  Stairs: No    Outcome Measures:  [facility] Basic Mobility  Turning from your back to your side while in a flat bed without using bedrails: A little  Moving from lying on your back to sitting on the side of a flat bed without using bedrails: A little  Moving to and from bed to chair (including a wheelchair): A little  Standing up from a chair using your arms (e.g. wheelchair or bedside chair): A little  To walk in hospital room: A little  Climbing 3-5 steps with railing: A lot  Basic Mobility - Total Score: 17    Education Documentation  Home Exercise Program, taught by Krysta Reed V PT at 10/4/2024 10:31 AM.  Learner: Patient  Readiness: Acceptance  Method: Explanation  Response: Verbalizes Understanding    Mobility Training, taught by Krysta CLEMONS PT at 10/4/2024 10:31 AM.  Learner: Patient  Readiness: Acceptance  Method: Explanation  Response: Verbalizes  Understanding    Education Comments  No comments found.        Encounter Problems       Encounter Problems (Active)       Balance       Pt will maintain static/dynamic standing balance x4 minutes with RW and modified independence to demonstrate improved balance (Progressing)       Start:  10/03/24    Expected End:  10/17/24               Mobility       Pt will complete bed mobility independently with HOB flat and no use of bed rails (Progressing)       Start:  10/03/24    Expected End:  10/17/24            Pt will amb >150ft with RW and modified independence in prep for safe discharge home  (Progressing)       Start:  10/03/24    Expected End:  10/17/24            Pt will a/descend 12 steps with 1 rail and modified independence in prep for safe home entry/to access bathroom (Progressing)       Start:  10/03/24    Expected End:  10/17/24               PT Transfers       Pt will transfer sit to stand with RW and modified independence in prep for out of bed mobility  (Progressing)       Start:  10/03/24    Expected End:  10/17/24

## 2024-10-04 NOTE — PROGRESS NOTES
Allyson Armendariz is a 73 y.o. female on day 5 of admission presenting with Pancreatic cancer (Multi).    Subjective   No acute events overnight. This AM, patient reports feeling well. She states that she has not had a bowel movement since surgery. Pain is 6/10 this AM and is mostly in her L knee. She was able to get out of bed and to a chair with assistance. Denies fevers, chills, shortness of breath, leg swelling, pain.    Objective   Last Recorded Vitals  /63   Pulse 74   Temp 36.2 °C (97.2 °F) (Temporal)   Resp 18   Wt 57.3 kg (126 lb 5.2 oz)   SpO2 96%   Intake/Output last 3 Shifts:    Intake/Output Summary (Last 24 hours) at 10/4/2024 0728  Last data filed at 10/4/2024 0605  Gross per 24 hour   Intake 1959.17 ml   Output 1575 ml   Net 384.17 ml     Admission Weight  Weight: 57.3 kg (126 lb 6.4 oz) (09/29/24 2156)  Daily Weight  10/02/24 : 57.3 kg (126 lb 5.2 oz)    Image Results  XR pelvis 1-2 views  Narrative: Interpreted By:  Milly Levia,   STUDY:  Single view pelvis.      INDICATION:  Signs/Symptoms:post op hip.      COMPARISON:  09/30/2024.      ACCESSION NUMBER(S):  [id number]      ORDERING CLINICIAN:  SILVIA COLIN      FINDINGS:  No acute fracture or malalignment.  Immediate postsurgical changes of left hip hemiarthroplasty.  Hardware is intact without perihardware fractures or lucencies.  Expected postsurgical soft tissue gas within the surrounding soft  tissues. Right hip joint space is maintained.      Impression: 1. Immediate postsurgical changes of left hip hemiarthroplasty for  femoral neck fracture without hardware complication.      MACRO:  None.      Signed by: Milly Leiva 10/2/2024 4:09 PM  Dictation workstation:   [id number]  FL fluoro images no charge  These images are not reportable by radiology and will not be interpreted   by  Radiologists.  Transthoracic Echo (TTE) Limited     [facility], [address] [place] [address]                  Tel [phone] and Fax [phone]    TRANSTHORACIC ECHOCARDIOGRAM REPORT       Patient Name:      LISSETH RITTERYARELISARAHY       Reading Physician:    [id number] Jimmy France MD  Study Date:        10/2/2024            Ordering Provider:    [id number] JESSICA DICK  MRN/PID:           [id number]             Fellow:  Accession#:        [id number]         Nurse:                Lilia Matt RN  Date of Birth/Age: 1950 / 73      Sonographer:          Marni ghosh RDCS, AVELINA  Gender:            F                    Additional Staff:  Height:            160.02 cm            Admit Date:           9/29/2024  Weight:            57.15 kg             Admission Status:     Inpatient - STAT  BSA / BMI:         1.59 m2 / 22.32      Encounter#:           [id number]                     kg/m2  Blood Pressure:    163/77 mmHg          Department Location:  [facility] Non                                                                Invasive    Study Type:    TRANSTHORACIC ECHO (TTE) LIMITED  Diagnosis/ICD: Cardiac murmur, unspecified-R01.1  Indication:    Eval LVOT post fluids  CPT Code:      Echo Limited-30124; Doppler Limited-54755; Color Doppler-37341    Patient History:  Pertinent History: Pancreatic cancer.    Study Detail: The following Echo studies were performed: 2D, M-Mode, Doppler and                color flow. Definity used as a contrast agent for endocardial                border definition. Total contrast used for this procedure was 2 mL                via IV push.       PHYSICIAN INTERPRETATION:  Left Ventricle: Left ventricular ejection fraction is hyperdynamic, by visual estimate at 75%. There are no regional left ventricular wall motion abnormalities. The left ventricular cavity size is normal. The left ventricular septal wall  thickness is mildly increased. Spectral Doppler shows an impaired relaxation pattern of left ventricular diastolic filling. There is a mild a mid cavity left ventricular obstruction. The resting gradient is 20 mmHg. The provoked gradient is 29 mmHg.  Left Atrium: The left atrium is upper limits of normal in size.  Right Ventricle: The right ventricle is normal in size. There is normal right ventricular global systolic function.  Right Atrium: The right atrium is normal in size.  Aortic Valve: The aortic valve was not well visualized. There is minimal aortic valve cusp calcification. There is trace aortic valve regurgitation.  Mitral Valve: The mitral valve is normal in structure. There is mild mitral annular calcification. There is trace mitral valve regurgitation.  Tricuspid Valve: The tricuspid valve was not well visualized. Tricuspid regurgitation was not assessed.  Pulmonic Valve: The pulmonic valve is not well visualized. The pulmonic valve regurgitation was not well visualized.  Pericardium: Trivial pericardial effusion.  Aorta: The aortic root is normal.  Systemic Veins: The inferior vena cava appears normal in size.  In comparison to the previous echocardiogram(s): Compared with study dated 9/30/2024, AV/LVOT/LV gradietns are now reduced.       CONCLUSIONS:   1. Poorly visualized anatomical structures due to suboptimal image quality.   2. Left ventricular ejection fraction is hyperdynamic, by visual estimate at 75%.   3. Spectral Doppler shows an impaired relaxation pattern of left ventricular diastolic filling.   4. There is a mild a mid cavity left ventricular obstruction. The resting gradient is 20 mmHg. The provoked gradient is 29 mmHg.         5. There is normal right ventricular global systolic function.    QUANTITATIVE DATA SUMMARY:     2D MEASUREMENTS:          Normal Ranges:  IVSd:            1.20 cm  (0.6-1.1cm)  LVPWd:           0.82 cm  (0.6-1.1cm)  LVIDd:           3.48 cm  (3.9-5.9cm)  LVIDs:            2.73 cm  LV Mass Index:   65 g/m2  LVEDV Index:     29 ml/m2  LV % FS          21.7 %       LA VOLUME:                   Normal Ranges:  LA Vol A4C:        52.4 ml   (22+/-6mL/m2)  LA Vol Index A4C:  33.0ml/m2  LA Area A4C:       17.2 cm2  LA Major Axis A4C: 4.8 cm       AORTA MEASUREMENTS:         Normal Ranges:  Ao Sinus, d:        3.00 cm (2.1-3.5cm)       LV SYSTOLIC FUNCTION BY 2D PLANIMETRY (MOD):                       Normal Ranges:  EF-A4C View:    85 % (>=55%)  EF-A2C View:    77 %  EF-Biplane:     82 %  EF-Visual:      75 %  LV EF Reported: 75 %       LV DIASTOLIC FUNCTION:             Normal Ranges:  MV Peak E:             0.76 m/s    (0.7-1.2 m/s)  MV Peak A:             1.21 m/s    (0.42-0.7 m/s)  E/A Ratio:             0.62        (1.0-2.2)  MV A Dur:              100.35 msec  MV DT:                 286 msec    (150-240 msec)       MITRAL VALVE:          Normal Ranges:  MV DT:        286 msec (150-240msec)       TRICUSPID VALVE/RVSP:         Normal Ranges:  IVC Diam:             1.70 cm       [id number] Jimmy France MD  Electronically signed on 10/2/2024 at 10:16:19 AM       ** Final **    Physical Exam  General: well-appearing, no acute distress, resting comfortably in bed  HEENT: normocephalic, atraumatic  Cardio: regular rate and rhythm, normal S1 and S2, holosystolic murmur  Pulm: clear to auscultation bilaterally, no wheezes, crackles, or rhonchi, breathing comfortably on room air  Abd: normal, active bowel sounds; soft, non-tender, non-distended  Extremities: no peripheral edema, scars, or lesions  Neuro: alert and oriented to person, place, and time, CN II-XII grossly intact  Psych: mood and affect are appropriate    Labs  Results for orders placed or performed during the hospital encounter of 09/29/24 (from the past 24 hour(s))   Lactate   Result Value Ref Range    Lactate 1.9 0.4 - 2.0 mmol/L   Magnesium   Result Value Ref Range    Magnesium 1.92 1.60 - 2.40 mg/dL   Renal Function Panel    Result Value Ref Range    Glucose 110 (H) 74 - 99 mg/dL    Sodium 133 (L) 136 - 145 mmol/L    Potassium 4.7 3.5 - 5.3 mmol/L    Chloride 100 98 - 107 mmol/L    Bicarbonate 25 21 - 32 mmol/L    Anion Gap 13 10 - 20 mmol/L    Urea Nitrogen 49 (H) 6 - 23 mg/dL    Creatinine 2.10 (H) 0.50 - 1.05 mg/dL    eGFR 24 (L) >60 mL/min/1.73m*2    Calcium 7.7 (L) 8.6 - 10.6 mg/dL    Phosphorus 3.7 2.5 - 4.9 mg/dL    Albumin 2.1 (L) 3.4 - 5.0 g/dL   CBC and Auto Differential   Result Value Ref Range    WBC 11.0 4.4 - 11.3 x10*3/uL    nRBC 0.0 0.0 - 0.0 /100 WBCs    RBC 3.12 (L) 4.00 - 5.20 x10*6/uL    Hemoglobin 9.5 (L) 12.0 - 16.0 g/dL    Hematocrit 29.1 (L) 36.0 - 46.0 %    MCV 93 80 - 100 fL    MCH 30.4 26.0 - 34.0 pg    MCHC 32.6 32.0 - 36.0 g/dL    RDW 16.7 (H) 11.5 - 14.5 %    Platelets 212 150 - 450 x10*3/uL    Neutrophils % 78.6 40.0 - 80.0 %    Immature Granulocytes %, Automated 0.6 0.0 - 0.9 %    Lymphocytes % 10.2 13.0 - 44.0 %    Monocytes % 9.7 2.0 - 10.0 %    Eosinophils % 0.8 0.0 - 6.0 %    Basophils % 0.1 0.0 - 2.0 %    Neutrophils Absolute 8.63 (H) 1.60 - 5.50 x10*3/uL    Immature Granulocytes Absolute, Automated 0.07 0.00 - 0.50 x10*3/uL    Lymphocytes Absolute 1.12 0.80 - 3.00 x10*3/uL    Monocytes Absolute 1.07 (H) 0.05 - 0.80 x10*3/uL    Eosinophils Absolute 0.09 0.00 - 0.40 x10*3/uL    Basophils Absolute 0.01 0.00 - 0.10 x10*3/uL         Assessment/Plan:  Allyson Armendariz is a 74 yo F w PMH of metastatic pancreatic adenocarcinoma (with mets to liver) and Crohn's disease presenting as a transfer from [facility] for further evaluation with orthopedic surgery after being found to have L complete pathologic femur fracture. Patient is planned for surgery with ortho tomorrow. Patient found to have systolic murmur on cardiac exam which is being further evaluated prior to surgery. Echo showed LV outflow tract obstruction, cardiology consulted for recs. Cardiology cleared after repeat limited echo showed improvement in LV  outflow tract obstruction following blood transfusion. Patient underwent L hemiarthroplasty on 10/2. Patient now having some urinary retention, worsening kidney function, and was noted to be hypotensive overnight and this AM though not complaining of lightheadedness or dizziness. Continuing to have worsening kidney function/JETHRO likely 2/2 hypotension yesterday and anesthesia, possibly ATN.     Updates 10/04  - will encourage incentive spirometry  - wound care: mepilex for 7 days, after 7 days, will leave to open air  - on subcutaneous heparin (d/t decreased GFR) starting today given concern for bleeding yesterday  - patient will require 6 weeks of DVT prophylaxis per orthopedic surgery  - added senna 2 tab daily   - PT/OT recommending home w home health  - will proceed with JETHRO workup tomorrow if kidney function continuing to worsen     #Complete pathologic fracture of L femur  Patient had significant pain in L leg for past several months with negative imaging. Found to have pathologic fracture of L femur on CT scans at [facility] (scans can now be found in our system).   PLAN:  - s/p surgery with ortho 10/2  - s/p ancef 2 g q8h x 3 doses per ortho recs for prophylaxis  - will encourage incentive spirometry  - wound care: mepilex for 7 days, after 7 days, will leave to open air  - will initiate subcutaneous heparin (d/t decreased GFR) starting tonight  - patient will require 6 weeks of DVT prophylaxis per orthopedic surgery  - PT/OT recommending home w home health     Ortho recs: pt should be weightbearing as tolerated until first follow up appointment. Patient will require 6 weeks total of DVT prophylaxis from an orthopedic standpoint, if ok per primary team. Patient currently has mepilex x1 on surgical site. Dressing should be removed POD7. Please send home with Calcium/Vitamin D 500mg-400IU BID for 6 weeks. Patient should follow up w/ Dr. Alcala 2 weeks after surgery for post-operative appointment (patient may call  [phone] to schedule). Please page with questions.      #Hypotension, resolved  Patient noted to be hypotensive overnight and today. Also having worsening creatinine and BUN today, likely d/t low volume status. Held metoprolol on 10/3. Today, blood pressures improved and HR in the low 100s.  PLAN:  - continue metoprolol tartrate 50 TID  - getting 1L LR today     #Holosystolic Murmur  Patient found to have holosystolic murmur on exam. Patient does not have known history of valvular dysfunction. Echo showed LV outflow obstruction that improved on repeat echo following transfusion. Due to low pressures, metoprolol held this AM.  PLAN:  - continue metoprolol tartrate 50 TID     #Metastatic Pancreatic Adenocarcinoma  Patient has metastatic pancreatic adenocarcinoma w mets to the liver. Follows with Dr. Hightower at [facility]. S/p gem/abraxane, which was stopped August 2024. Plan was to biopsy liver lesions with EUS to guide further treatment, but patient's labs are not concerning for obstruction.  PLAN:  - will reach out to Dr. Hightower to inform about admission  - continue vitamin B and D and creon 72456 TID  - pain regimen: duloxetine 30 mg daily, gabapentin 300 mg TID, acetaminophen 975 mg q6h PRN mild pain, oxycodone 10 mg q4h PRN severe pain  - will consider supportive oncology consult if patient has worsening pain    This patient has a central line   Reason for the central line remaining today? Parenteral medication    F: PRN  E: K > 4, Mg > 2  N: regular diet  A: mediport  GI: pantoprazole, miralax, senna  DVT: subcutaneous heparin  Abx: ancef (10/2-10/3)  Dispo: home with home health  Pain: duloxetine 30 mg daily, gabapentin 300 mg TID, acetaminophen 975 mg q6h PRN mild pain, oxycodone 10 mg q4h PRN severe pain     Code: DNR/DNI (confirmed on admission)  NOK: Oriana (, [phone]), Shameka (daughter, [phone])     Patient seen and discussed with attending, Dr. Rahel Maldonado MD  Internal Medicine  PGY1  [facility] Team [id number]

## 2024-10-04 NOTE — HOSPITAL COURSE
Allyson Armendariz is a 74 yo F w PMH of pancreatic adenocarcinoma (w mets to the liver) and Crohn's Disease transferred from [facility] for further evaluation with orthopedic surgery for a L pathologic femur fracture. She was found to have a holosystolic murmur and echo showed a LV outflow tract obtsruction, so she was seen by cardiology for cardiac risk assessment and surgery clearance. They recommended fluid resuscitation as well as metoprolol 50 tartrate TID followed by repeat echo, which showed improvement in LV outflow tract obstruction. She underwent hemiarthroplasty with orthopedic surgery on 10/2/2024. On 10/3/2024, she had hypotension that responded to fluids and urinary retention, requiring straight catheterization. On 10/4/2024, she began having worsening kidney function and urinary retention. JETHRO thought to be pre-renal given improvement with fluids vs ATN d/t hypotension on 10/3. Renal US showed some hydronephrosis. On 10/6, she had some increased shortness of breath that improved with duoneb treatment. Pt then had some urinary retention with PVR >350, solares in. Cr plateau at 2.01. Consulted Urology, recommended foleys to decompress bladder. CT A/P remarkable for findings consistent with metastatic pancreatic neoplasm to the liver and carcinomatosis. anasarca with pleural effusions, subcutaneous edema and ascites. Pt's urinary retention resolved on 10//9, confirmed after a TOV with  ml. Her cr also downtredning. Metop tartrate 50 mg q6 switched to metop succ 200 mg daily on discharge.    Todo:  [ ] follow-up with orthopedic surgery (SHEREEN Castro) on 10/22/2024  [ ] follow up appointment with Dr. Hightower on 10/18/24

## 2024-10-04 NOTE — DISCHARGE INSTRUCTIONS
Dear Ms. Armendariz,    You were admitted from 9/29 because of a fracture of your femur. You got an ultrasound of your heart which showed that you needed some more fluid so we gave you fluid and some blood. A repeat ultrasound showed improvement in your heart function. You had surgery with orthopedic surgery on 10/2. After surgery, your blood pressure was a bit low so we gave you more fluids. You were also having some difficulty with urination so we had to use a catheter to drain your bladder a few times. Your kidney function worsened a little bit in the days after your surgery likely because of your low blood pressure but it improved on its own. We gave you an inhaler treatment for your shortness of breath, which also improved. Your CT scan showed some disease progression. You also had a biopsy during your hip surgery which has not resulted yet.    START taking  Melatonin 3 mg at bedtime  Metop succinate 200 mg daily (this is for heart function)  Miralax  as needed for constipation  Doc-senna as needed for constipation  Calcium/Vitamin D 500mg-400IU twice daily for 6 weeks (through 11/13/24)  Eliquis 2.5 mg twice a day for 6 weeks (through 11/13/24)      STOP taking  Decadron     CONTINUE taking all your other home medications as before    Please take your medications as instructed and attend all your follow up appointments.    You have a dressing on your surgical site that should be removed on 10/9.    Follow-up appointments:  We have requested a follow-up appointment with Dr. Alcala 2 weeks after surgery for post-operative appointment, if you don't hear back about an appointment date, please call [phone].  Follow-up with orthopedic surgery (SHEREEN Castro) on 10/22/2024  Please schedule an appointment with your PCP.  You have a follow up appointment with Dr. Hightower on 10/18/24      Thank you for allowing [facility] to be a part of your care!  Your  Care Team

## 2024-10-05 LAB
ALBUMIN SERPL BCP-MCNC: 2.3 G/DL (ref 3.4–5)
ALBUMIN SERPL BCP-MCNC: 2.3 G/DL (ref 3.4–5)
ANION GAP SERPL CALC-SCNC: 13 MMOL/L (ref 10–20)
ANION GAP SERPL CALC-SCNC: 13 MMOL/L (ref 10–20)
ATRIAL RATE: 98 BPM
BACTERIA UR CULT: NO GROWTH
BACTERIA UR CULT: NO GROWTH
BASOPHILS # BLD AUTO: 0.03 X10*3/UL (ref 0–0.1)
BASOPHILS # BLD AUTO: 0.03 X10*3/UL (ref 0–0.1)
BASOPHILS NFR BLD AUTO: 0.3 %
BASOPHILS NFR BLD AUTO: 0.3 %
BUN SERPL-MCNC: 50 MG/DL (ref 6–23)
BUN SERPL-MCNC: 50 MG/DL (ref 6–23)
CALCIUM SERPL-MCNC: 7.7 MG/DL (ref 8.6–10.6)
CALCIUM SERPL-MCNC: 7.7 MG/DL (ref 8.6–10.6)
CHLORIDE SERPL-SCNC: 99 MMOL/L (ref 98–107)
CHLORIDE SERPL-SCNC: 99 MMOL/L (ref 98–107)
CHLORIDE UR-SCNC: 60 MMOL/L
CHLORIDE UR-SCNC: 60 MMOL/L
CHLORIDE/CREATININE (MMOL/G) IN URINE: 139 MMOL/G CREAT (ref 38–318)
CHLORIDE/CREATININE (MMOL/G) IN URINE: 139 MMOL/G CREAT (ref 38–318)
CO2 SERPL-SCNC: 25 MMOL/L (ref 21–32)
CO2 SERPL-SCNC: 25 MMOL/L (ref 21–32)
CREAT SERPL-MCNC: 2.39 MG/DL (ref 0.5–1.05)
CREAT SERPL-MCNC: 2.39 MG/DL (ref 0.5–1.05)
CREAT UR-MCNC: 43.1 MG/DL (ref 20–320)
CREAT UR-MCNC: 43.1 MG/DL (ref 20–320)
EGFRCR SERPLBLD CKD-EPI 2021: 21 ML/MIN/1.73M*2
EGFRCR SERPLBLD CKD-EPI 2021: 21 ML/MIN/1.73M*2
EOSINOPHIL # BLD AUTO: 0.06 X10*3/UL (ref 0–0.4)
EOSINOPHIL # BLD AUTO: 0.06 X10*3/UL (ref 0–0.4)
EOSINOPHIL NFR BLD AUTO: 0.5 %
EOSINOPHIL NFR BLD AUTO: 0.5 %
ERYTHROCYTE [DISTWIDTH] IN BLOOD BY AUTOMATED COUNT: 15.9 % (ref 11.5–14.5)
ERYTHROCYTE [DISTWIDTH] IN BLOOD BY AUTOMATED COUNT: 15.9 % (ref 11.5–14.5)
GLUCOSE SERPL-MCNC: 109 MG/DL (ref 74–99)
GLUCOSE SERPL-MCNC: 109 MG/DL (ref 74–99)
HCT VFR BLD AUTO: 31.3 % (ref 36–46)
HCT VFR BLD AUTO: 31.3 % (ref 36–46)
HGB BLD-MCNC: 10.2 G/DL (ref 12–16)
HGB BLD-MCNC: 10.2 G/DL (ref 12–16)
HOLD SPECIMEN: NORMAL
HOLD SPECIMEN: NORMAL
IMM GRANULOCYTES # BLD AUTO: 0.11 X10*3/UL (ref 0–0.5)
IMM GRANULOCYTES # BLD AUTO: 0.11 X10*3/UL (ref 0–0.5)
IMM GRANULOCYTES NFR BLD AUTO: 1 % (ref 0–0.9)
IMM GRANULOCYTES NFR BLD AUTO: 1 % (ref 0–0.9)
LYMPHOCYTES # BLD AUTO: 0.97 X10*3/UL (ref 0.8–3)
LYMPHOCYTES # BLD AUTO: 0.97 X10*3/UL (ref 0.8–3)
LYMPHOCYTES NFR BLD AUTO: 8.7 %
LYMPHOCYTES NFR BLD AUTO: 8.7 %
MAGNESIUM SERPL-MCNC: 2.06 MG/DL (ref 1.6–2.4)
MAGNESIUM SERPL-MCNC: 2.06 MG/DL (ref 1.6–2.4)
MCH RBC QN AUTO: 30.5 PG (ref 26–34)
MCH RBC QN AUTO: 30.5 PG (ref 26–34)
MCHC RBC AUTO-ENTMCNC: 32.6 G/DL (ref 32–36)
MCHC RBC AUTO-ENTMCNC: 32.6 G/DL (ref 32–36)
MCV RBC AUTO: 94 FL (ref 80–100)
MCV RBC AUTO: 94 FL (ref 80–100)
MONOCYTES # BLD AUTO: 1.16 X10*3/UL (ref 0.05–0.8)
MONOCYTES # BLD AUTO: 1.16 X10*3/UL (ref 0.05–0.8)
MONOCYTES NFR BLD AUTO: 10.4 %
MONOCYTES NFR BLD AUTO: 10.4 %
NEUTROPHILS # BLD AUTO: 8.87 X10*3/UL (ref 1.6–5.5)
NEUTROPHILS # BLD AUTO: 8.87 X10*3/UL (ref 1.6–5.5)
NEUTROPHILS NFR BLD AUTO: 79.1 %
NEUTROPHILS NFR BLD AUTO: 79.1 %
NRBC BLD-RTO: 0 /100 WBCS (ref 0–0)
NRBC BLD-RTO: 0 /100 WBCS (ref 0–0)
P AXIS: 36 DEGREES
PHOSPHATE SERPL-MCNC: 3.7 MG/DL (ref 2.5–4.9)
PHOSPHATE SERPL-MCNC: 3.7 MG/DL (ref 2.5–4.9)
PLATELET # BLD AUTO: 206 X10*3/UL (ref 150–450)
PLATELET # BLD AUTO: 206 X10*3/UL (ref 150–450)
POTASSIUM SERPL-SCNC: 4.8 MMOL/L (ref 3.5–5.3)
POTASSIUM SERPL-SCNC: 4.8 MMOL/L (ref 3.5–5.3)
POTASSIUM UR-SCNC: 24 MMOL/L
POTASSIUM UR-SCNC: 24 MMOL/L
POTASSIUM/CREAT UR-RTO: 56 MMOL/G CREAT
POTASSIUM/CREAT UR-RTO: 56 MMOL/G CREAT
PR INTERVAL: 104 MS
Q ONSET: 218 MS
QRS COUNT: 16 BEATS
QRS DURATION: 90 MS
QT INTERVAL: 360 MS
QTC CALCULATION(BAZETT): 459 MS
QTC FREDERICIA: 424 MS
R AXIS: 53 DEGREES
RBC # BLD AUTO: 3.34 X10*6/UL (ref 4–5.2)
RBC # BLD AUTO: 3.34 X10*6/UL (ref 4–5.2)
SODIUM SERPL-SCNC: 132 MMOL/L (ref 136–145)
SODIUM SERPL-SCNC: 132 MMOL/L (ref 136–145)
SODIUM UR-SCNC: 68 MMOL/L
SODIUM UR-SCNC: 68 MMOL/L
SODIUM/CREAT UR-RTO: 158 MMOL/G CREAT
SODIUM/CREAT UR-RTO: 158 MMOL/G CREAT
T AXIS: 66 DEGREES
T OFFSET: 398 MS
VENTRICULAR RATE: 98 BPM
WBC # BLD AUTO: 11.2 X10*3/UL (ref 4.4–11.3)
WBC # BLD AUTO: 11.2 X10*3/UL (ref 4.4–11.3)

## 2024-10-05 PROCEDURE — 80069 RENAL FUNCTION PANEL: CPT

## 2024-10-05 PROCEDURE — 83735 ASSAY OF MAGNESIUM: CPT

## 2024-10-05 PROCEDURE — 2500000001 HC RX 250 WO HCPCS SELF ADMINISTERED DRUGS (ALT 637 FOR MEDICARE OP)

## 2024-10-05 PROCEDURE — 2500000005 HC RX 250 GENERAL PHARMACY W/O HCPCS

## 2024-10-05 PROCEDURE — 97110 THERAPEUTIC EXERCISES: CPT | Mod: GP

## 2024-10-05 PROCEDURE — 85025 COMPLETE CBC W/AUTO DIFF WBC: CPT

## 2024-10-05 PROCEDURE — 2500000004 HC RX 250 GENERAL PHARMACY W/ HCPCS (ALT 636 FOR OP/ED)

## 2024-10-05 PROCEDURE — 97530 THERAPEUTIC ACTIVITIES: CPT | Mod: GP

## 2024-10-05 PROCEDURE — 99233 SBSQ HOSP IP/OBS HIGH 50: CPT

## 2024-10-05 PROCEDURE — 1170000001 HC PRIVATE ONCOLOGY ROOM DAILY

## 2024-10-05 PROCEDURE — 97116 GAIT TRAINING THERAPY: CPT | Mod: GP

## 2024-10-05 PROCEDURE — 82436 ASSAY OF URINE CHLORIDE: CPT

## 2024-10-05 RX ORDER — METOPROLOL SUCCINATE 50 MG/1
50 TABLET, EXTENDED RELEASE ORAL DAILY
Status: DISCONTINUED | OUTPATIENT
Start: 2024-10-06 | End: 2024-10-06

## 2024-10-05 RX ADMIN — HEPARIN SODIUM 5000 UNITS: 5000 INJECTION, SOLUTION INTRAVENOUS; SUBCUTANEOUS at 21:32

## 2024-10-05 RX ADMIN — GABAPENTIN 300 MG: 300 CAPSULE ORAL at 15:05

## 2024-10-05 RX ADMIN — GABAPENTIN 300 MG: 300 CAPSULE ORAL at 21:31

## 2024-10-05 RX ADMIN — POLYETHYLENE GLYCOL 3350 17 G: 17 POWDER, FOR SOLUTION ORAL at 08:23

## 2024-10-05 RX ADMIN — GABAPENTIN 300 MG: 300 CAPSULE ORAL at 08:23

## 2024-10-05 RX ADMIN — HEPARIN SODIUM 5000 UNITS: 5000 INJECTION, SOLUTION INTRAVENOUS; SUBCUTANEOUS at 06:16

## 2024-10-05 RX ADMIN — DULOXETINE HYDROCHLORIDE 30 MG: 30 CAPSULE, DELAYED RELEASE ORAL at 08:23

## 2024-10-05 RX ADMIN — HEPARIN SODIUM 5000 UNITS: 5000 INJECTION, SOLUTION INTRAVENOUS; SUBCUTANEOUS at 15:05

## 2024-10-05 RX ADMIN — PANCRELIPASE 2 CAPSULE: 120000; 24000; 76000 CAPSULE, DELAYED RELEASE PELLETS ORAL at 17:32

## 2024-10-05 RX ADMIN — PANTOPRAZOLE SODIUM 40 MG: 40 TABLET, DELAYED RELEASE ORAL at 08:23

## 2024-10-05 RX ADMIN — DIPHENHYDRAMINE HYDROCHLORIDE AND LIDOCAINE HYDROCHLORIDE AND ALUMINUM HYDROXIDE AND MAGNESIUM HYDRO 10 ML: KIT at 12:36

## 2024-10-05 RX ADMIN — PANCRELIPASE 2 CAPSULE: 120000; 24000; 76000 CAPSULE, DELAYED RELEASE PELLETS ORAL at 08:23

## 2024-10-05 RX ADMIN — SODIUM CHLORIDE, POTASSIUM CHLORIDE, SODIUM LACTATE AND CALCIUM CHLORIDE 1000 ML: 600; 310; 30; 20 INJECTION, SOLUTION INTRAVENOUS at 17:35

## 2024-10-05 RX ADMIN — CYANOCOBALAMIN TAB 1000 MCG 1000 MCG: 1000 TAB at 08:23

## 2024-10-05 RX ADMIN — OXYCODONE HYDROCHLORIDE 10 MG: 5 TABLET ORAL at 02:40

## 2024-10-05 ASSESSMENT — COGNITIVE AND FUNCTIONAL STATUS - GENERAL
WALKING IN HOSPITAL ROOM: A LITTLE
MOBILITY SCORE: 17
DRESSING REGULAR UPPER BODY CLOTHING: A LITTLE
STANDING UP FROM CHAIR USING ARMS: A LITTLE
HELP NEEDED FOR BATHING: A LITTLE
MOVING TO AND FROM BED TO CHAIR: A LITTLE
MOVING FROM LYING ON BACK TO SITTING ON SIDE OF FLAT BED WITH BEDRAILS: A LITTLE
DAILY ACTIVITIY SCORE: 18
DRESSING REGULAR LOWER BODY CLOTHING: A LITTLE
MOVING TO AND FROM BED TO CHAIR: A LITTLE
CLIMB 3 TO 5 STEPS WITH RAILING: A LOT
MOBILITY SCORE: 16
PERSONAL GROOMING: A LITTLE
WALKING IN HOSPITAL ROOM: A LOT
TURNING FROM BACK TO SIDE WHILE IN FLAT BAD: A LITTLE
STANDING UP FROM CHAIR USING ARMS: A LITTLE
MOVING FROM LYING ON BACK TO SITTING ON SIDE OF FLAT BED WITH BEDRAILS: A LITTLE
CLIMB 3 TO 5 STEPS WITH RAILING: A LOT
EATING MEALS: A LITTLE
TOILETING: A LITTLE
TURNING FROM BACK TO SIDE WHILE IN FLAT BAD: A LITTLE

## 2024-10-05 ASSESSMENT — PAIN SCALES - GENERAL
PAINLEVEL_OUTOF10: 3
PAINLEVEL_OUTOF10: 7
PAINLEVEL_OUTOF10: 4

## 2024-10-05 ASSESSMENT — PAIN - FUNCTIONAL ASSESSMENT
PAIN_FUNCTIONAL_ASSESSMENT: 0-10
PAIN_FUNCTIONAL_ASSESSMENT: 0-10

## 2024-10-05 NOTE — PROGRESS NOTES
Physical Therapy    Physical Therapy Treatment    Patient Name: Allyson Armendariz  MRN: [id number]  Department: [facility]  Room: 60/6011-A  Today's Date: 10/5/2024  Time Calculation  Start Time: 1403  Stop Time: 1446  Time Calculation (min): 43 min    Assessment/Plan   PT Assessment  Evaluation/Treatment Tolerance: Patient tolerated treatment well  Medical Staff Made Aware: Yes  End of Session Communication: Bedside nurse  Assessment Comment: Pt with improved tolerance to activty to date. Pt was able to ambulate 60ft x 2 with supervision and WW. Pt limited in ability to WB through LLE and decreased endurance. Patient continues to benefit from skilled physical therapy to maximize functional mobility and safety. Pt remains appropriate for low intensity therapy when medically appropriate for DC.  End of Session Patient Position: Bed, 3 rail up, Alarm off, not on at start of session (CB in reach)  PT Plan  Inpatient/Swing Bed or Outpatient: Inpatient  PT Plan  Treatment/Interventions: Bed mobility, Transfer training, Gait training, Stair training, Balance training, Neuromuscular re-education, Strengthening, Endurance training, Therapeutic exercise, Therapeutic activity, Home exercise program  PT Plan: Ongoing PT  PT Frequency: Daily  PT Discharge Recommendations: Low intensity level of continued care (pt would benefit from high intensity but is requesting low intensity PT)  Equipment Recommended upon Discharge: Wheeled walker  PT Recommended Transfer Status: Assistive device, Stand by assist  PT - OK to Discharge: Yes (eval complete and discharge recommendations made)  RN cleared prior to session    General Visit Information:   PT  Visit  PT Received On: 10/05/24  Response to Previous Treatment: Patient reporting fatigue but able to participate.  General  Reason for Referral: pathologic L FNF s/p L Hemiarthroplasty on 10/2 with Dr. Alcala  Past Medical History Relevant to Rehab: Per chart, PMH includes Chron's disease,  pancreatic adenocarcinoma with c/f mets to the liver  Family/Caregiver Present: No  Prior to Session Communication: Bedside nurse  Patient Position Received: Bed, 3 rail up, Alarm off, not on at start of session  Preferred Learning Style: auditory, verbal  General Comment: Pt pleasant and agreeable to therapy    Subjective   Precautions:  Precautions  LE Weight Bearing Status: Weight Bearing as Tolerated  Medical Precautions: Fall precautions    Objective   Pain:  Pain Assessment  Pain Assessment:  (pt did not rate pain during - endorsing soreness)  Cognition:  Cognition  Overall Cognitive Status: Within Functional Limits  Orientation Level: Oriented X4  Coordination:  Movements are Fluid and Coordinated: Yes  Postural Control:  Postural Control  Postural Control: Within Functional Limits    Activity Tolerance:  Activity Tolerance  Endurance: Tolerates 30 min exercise with multiple rests  Treatments:  Therapeutic Exercise  Therapeutic Exercise Performed: Yes  Therapeutic Exercise Activity 1: seated active LAQ, AP x 15 B, RLE active marches x 15, active assisted marches x 15 on LLE  Therapeutic Exercise Activity 2: supine active on RLE SLR, hip abd, HS x 15, active assisted on LLE SLR, HS, hip ABD x 15    Therapeutic Activity  Therapeutic Activity Performed: Yes  Therapeutic Activity 1: static/dynamic standing in bathroom x 3 min with CGA and WW  Therapeutic Activity 2: Pt educated on use of RLE or sheet to assist with lifting LLE during bed mobility and demoed during sit>sup transfer  Therapeutic Activity 3: Pt educated on energy conservation and dc and safety in the home; Pt also educated on performing HEP as well as using UE to help move LLE in sitting prior to stand 2/2 leg being too far behind her when attempting to stand (pt states it does not listen when she tells it to move)    Bed Mobility  Bed Mobility: Yes  Bed Mobility 1  Bed Mobility 1: Supine to sitting, Sitting to supine  Level of Assistance 1: Minimum  assistance  Bed Mobility Comments 1: min vc for hand placement, sequencng, and use of sheet durng transfers; assist at LLE required  Bed Mobility 2  Bed Mobility  2: Scooting  Level of Assistance 2: Moderate assistance  Bed Mobility Comments 2: to EOB ad once supine to center hips in bed; mod vc for sequencing and technique    Ambulation/Gait Training  Ambulation/Gait Training Performed: Yes  Ambulation/Gait Training 1  Surface 1: Level tile  Device 1: Rolling walker  Assistance 1: Close supervision  Quality of Gait 1: Narrow base of support, Decreased step length, Inconsistent stride length, Diminished heel strike, Forward flexed posture, Shuffling gait (decreased foot clearance and millie; step to pattern;)  Comments/Distance (ft) 1: 20ft, 60ft x 2 with rest breaks between 1 sitting and 1 standing; mod vc for AD use, NBOS, and foot clearance  Transfers  Transfer: Yes  Transfer 1  Transfer From 1: Sit to, Stand to  Transfer to 1: Stand, Sit  Technique 1: Sit to stand, Stand to sit  Transfer Device 1: Walker  Transfer Level of Assistance 1: Contact guard, Minimum assistance  Trials/Comments 1: 2 trials; in A from low surface; mod vc for hand placement, safety, and sequencing    Outcome Measures:  [facility] Basic Mobility  Turning from your back to your side while in a flat bed without using bedrails: A little  Moving from lying on your back to sitting on the side of a flat bed without using bedrails: A little  Moving to and from bed to chair (including a wheelchair): A little  Standing up from a chair using your arms (e.g. wheelchair or bedside chair): A little  To walk in hospital room: A little  Climbing 3-5 steps with railing: A lot  Basic Mobility - Total Score: 17    Education Documentation  Home Exercise Program, taught by Myriam Miller PT at 10/5/2024  4:30 PM.  Learner: Patient  Readiness: Acceptance  Method: Explanation, Demonstration  Response: Verbalizes Understanding, Needs Reinforcement    Mobility  Training, taught by Myriam Miller, PT at 10/5/2024  4:30 PM.  Learner: Patient  Readiness: Acceptance  Method: Explanation, Demonstration  Response: Verbalizes Understanding, Needs Reinforcement    Education Comments  No comments found.      Encounter Problems       Encounter Problems (Active)       Balance       Pt will maintain static/dynamic standing balance x4 minutes with RW and modified independence to demonstrate improved balance (Progressing)       Start:  10/03/24    Expected End:  10/17/24               Mobility       Pt will complete bed mobility independently with HOB flat and no use of bed rails (Progressing)       Start:  10/03/24    Expected End:  10/17/24            Pt will amb >150ft with RW and modified independence in prep for safe discharge home  (Progressing)       Start:  10/03/24    Expected End:  10/17/24            Pt will a/descend 12 steps with 1 rail and modified independence in prep for safe home entry/to access bathroom (Progressing)       Start:  10/03/24    Expected End:  10/17/24               PT Transfers       Pt will transfer sit to stand with RW and modified independence in prep for out of bed mobility  (Progressing)       Start:  10/03/24    Expected End:  10/17/24               Pain - Adult

## 2024-10-05 NOTE — PROGRESS NOTES
Allyson Armendariz is a 73 y.o. female on day 6 of admission presenting with Pancreatic cancer (Multi).    Subjective   No acute events overnight. This AM, patient reports feeling well. He had multiple Bms today ( was constipated before). She was able to get out of bed and to a chair with assistance. Denies fevers, chills, shortness of breath, leg swelling, pain.    Objective   Last Recorded Vitals  /72 (BP Location: Left arm, Patient Position: Lying)   Pulse 86   Temp 36.2 °C (97.2 °F) (Temporal)   Resp 18   Wt 57.3 kg (126 lb 5.2 oz)   SpO2 98%   Intake/Output last 3 Shifts:    Intake/Output Summary (Last 24 hours) at 10/5/2024 1714  Last data filed at 10/5/2024 1056  Gross per 24 hour   Intake 60 ml   Output 1250 ml   Net -1190 ml     Admission Weight  Weight: 57.3 kg (126 lb 6.4 oz) (09/29/24 2156)  Daily Weight  10/02/24 : 57.3 kg (126 lb 5.2 oz)    Image Results  ECG 12 Lead  Sinus rhythm with short MT with Premature atrial complexes  Otherwise normal ECG  No previous ECGs available    Physical Exam  General: well-appearing, no acute distress, resting comfortably in bed  HEENT: normocephalic, atraumatic  Cardio: regular rate and rhythm, normal S1 and S2, holosystolic murmur  Pulm: clear to auscultation bilaterally, no wheezes, crackles, or rhonchi, breathing comfortably on room air  Abd: normal, active bowel sounds; soft, non-tender, non-distended  Extremities: no peripheral edema, scars, or lesions  Neuro: alert and oriented to person, place, and time, CN II-XII grossly intact  Psych: mood and affect are appropriate    Labs  Results for orders placed or performed during the hospital encounter of 09/29/24 (from the past 24 hour(s))   Magnesium   Result Value Ref Range    Magnesium 2.06 1.60 - 2.40 mg/dL   Renal Function Panel   Result Value Ref Range    Glucose 109 (H) 74 - 99 mg/dL    Sodium 132 (L) 136 - 145 mmol/L    Potassium 4.8 3.5 - 5.3 mmol/L    Chloride 99 98 - 107 mmol/L    Bicarbonate 25 21 -  32 mmol/L    Anion Gap 13 10 - 20 mmol/L    Urea Nitrogen 50 (H) 6 - 23 mg/dL    Creatinine 2.39 (H) 0.50 - 1.05 mg/dL    eGFR 21 (L) >60 mL/min/1.73m*2    Calcium 7.7 (L) 8.6 - 10.6 mg/dL    Phosphorus 3.7 2.5 - 4.9 mg/dL    Albumin 2.3 (L) 3.4 - 5.0 g/dL   CBC and Auto Differential   Result Value Ref Range    WBC 11.2 4.4 - 11.3 x10*3/uL    nRBC 0.0 0.0 - 0.0 /100 WBCs    RBC 3.34 (L) 4.00 - 5.20 x10*6/uL    Hemoglobin 10.2 (L) 12.0 - 16.0 g/dL    Hematocrit 31.3 (L) 36.0 - 46.0 %    MCV 94 80 - 100 fL    MCH 30.5 26.0 - 34.0 pg    MCHC 32.6 32.0 - 36.0 g/dL    RDW 15.9 (H) 11.5 - 14.5 %    Platelets 206 150 - 450 x10*3/uL    Neutrophils % 79.1 40.0 - 80.0 %    Immature Granulocytes %, Automated 1.0 (H) 0.0 - 0.9 %    Lymphocytes % 8.7 13.0 - 44.0 %    Monocytes % 10.4 2.0 - 10.0 %    Eosinophils % 0.5 0.0 - 6.0 %    Basophils % 0.3 0.0 - 2.0 %    Neutrophils Absolute 8.87 (H) 1.60 - 5.50 x10*3/uL    Immature Granulocytes Absolute, Automated 0.11 0.00 - 0.50 x10*3/uL    Lymphocytes Absolute 0.97 0.80 - 3.00 x10*3/uL    Monocytes Absolute 1.16 (H) 0.05 - 0.80 x10*3/uL    Eosinophils Absolute 0.06 0.00 - 0.40 x10*3/uL    Basophils Absolute 0.03 0.00 - 0.10 x10*3/uL         Assessment/Plan:  Allyson Armendariz is a 74 yo F w PMH of metastatic pancreatic adenocarcinoma (with mets to liver) and Crohn's disease presenting as a transfer from [facility], operated by [facility] for further evaluation with orthopedic surgery after being found to have L complete pathologic femur fracture. Patient is planned for surgery with ortho tomorrow. Patient found to have systolic murmur on cardiac exam which is being further evaluated prior to surgery. Echo showed LV outflow tract obstruction, cardiology consulted for recs. Cardiology cleared after repeat limited echo showed improvement in LV outflow tract obstruction following blood transfusion. Patient underwent L hemiarthroplasty on 10/2. Patient now having some urinary retention, worsening kidney function, and  was noted to be hypotensive overnight and this AM though not complaining of lightheadedness or dizziness. Continuing to have worsening kidney function/JETHRO likely 2/2 hypotension on 10/03 and anesthesia, possibly ATN.     Updates 10/04  - will encourage incentive spirometry  - wound care: mepilex for 7 days, after 7 days, will leave to open air  - on subcutaneous heparin (d/t decreased GFR) starting today given concern for bleeding yesterday  - patient will require 6 weeks of DVT prophylaxis per orthopedic surgery  - PT/OT recommending home w home health  - Given 1 L of fluid    - JETHRO workup ordered: US kidneys, UA, urine lytes, checking strict input output  - Started metop 50mg succinate ( stopped metop tartrate 50 mg TID)       #Complete pathologic fracture of L femur  Patient had significant pain in L leg for past several months with negative imaging. Found to have pathologic fracture of L femur on CT scans at [facility] (scans can now be found in our system).   PLAN:  - s/p surgery with ortho 10/2  - s/p ancef 2 g q8h x 3 doses per ortho recs for prophylaxis  - will encourage incentive spirometry  - wound care: mepilex for 7 days, after 7 days, will leave to open air  - will initiate subcutaneous heparin (d/t decreased GFR) starting tonight  - patient will require 6 weeks of DVT prophylaxis per orthopedic surgery  - PT/OT recommending home w home health     Ortho recs: pt should be weightbearing as tolerated until first follow up appointment. Patient will require 6 weeks total of DVT prophylaxis from an orthopedic standpoint, if ok per primary team. Patient currently has mepilex x1 on surgical site. Dressing should be removed POD7. Please send home with Calcium/Vitamin D 500mg-400IU BID for 6 weeks. Patient should follow up w/ Dr. Alcala 2 weeks after surgery for post-operative appointment (patient may call [phone] to schedule). Please page with questions.      #Hypotension, resolved  Patient noted to be  hypotensive overnight and today. Also having worsening creatinine and BUN today, likely d/t low volume status. Held metoprolol on 10/3. Today, blood pressures improved and HR in the low 100s.  PLAN:  - Started metop 50mg succinate ( stopped metop tartrate 50 mg TID)    - getting 1L LR today     #Holosystolic Murmur  Patient found to have holosystolic murmur on exam. Patient does not have known history of valvular dysfunction. Echo showed LV outflow obstruction that improved on repeat echo following transfusion. Due to low pressures, metoprolol held this AM.  PLAN:  - continue metoprolol tartrate 50 TID     #Metastatic Pancreatic Adenocarcinoma  Patient has metastatic pancreatic adenocarcinoma w mets to the liver. Follows with Dr. Hightower at [facility]. S/p gem/abraxane, which was stopped August 2024. Plan was to biopsy liver lesions with EUS to guide further treatment, but patient's labs are not concerning for obstruction.  PLAN:  - will reach out to Dr. Hightower to inform about admission  - continue vitamin B and D and creon 27673 TID  - pain regimen: duloxetine 30 mg daily, gabapentin 300 mg TID, acetaminophen 975 mg q6h PRN mild pain, oxycodone 10 mg q4h PRN severe pain  - will consider supportive oncology consult if patient has worsening pain    This patient has a central line   Reason for the central line remaining today? Parenteral medication    F: PRN  E: K > 4, Mg > 2  N: regular diet  A: mediport  GI: pantoprazole, miralax, senna  DVT: subcutaneous heparin  Abx: ancef (10/2-10/3)  Dispo: home with home health  Pain: duloxetine 30 mg daily, gabapentin 300 mg TID, acetaminophen 975 mg q6h PRN mild pain, oxycodone 10 mg q4h PRN severe pain     Code: DNR/DNI (confirmed on admission)  NOK: Oriana (, [phone]), Shameka (daughter, [phone])     Patient seen and discussed with attending, Dr. Rahel Mario MD  Internal Medicine PGY2  [facility] Team [id number]

## 2024-10-05 NOTE — CARE PLAN
The patient's goals for the shift include      The clinical goals for the shift include pt will remain safe andfree from injury      Problem: Fall/Injury  Goal: Not fall by end of shift  Outcome: Progressing  Goal: Be free from injury by end of the shift  Outcome: Progressing  Goal: Verbalize understanding of personal risk factors for fall in the hospital  Outcome: Progressing  Goal: Verbalize understanding of risk factor reduction measures to prevent injury from fall in the home  Outcome: Progressing  Goal: Use assistive devices by end of the shift  Outcome: Progressing  Goal: Pace activities to prevent fatigue by end of the shift  Outcome: Progressing     Problem: Pain - Adult  Goal: Verbalizes/displays adequate comfort level or baseline comfort level  Outcome: Progressing     Problem: Safety - Adult  Goal: Free from fall injury  Outcome: Progressing     Problem: Chronic Conditions and Co-morbidities  Goal: Patient's chronic conditions and co-morbidity symptoms are monitored and maintained or improved  Outcome: Progressing     Problem: Skin  Goal: Decreased wound size/increased tissue granulation at next dressing change  Outcome: Progressing  Goal: Participates in plan/prevention/treatment measures  Outcome: Progressing  Goal: Prevent/manage excess moisture  Outcome: Progressing  Goal: Prevent/minimize sheer/friction injuries  Outcome: Progressing  Goal: Promote/optimize nutrition  Outcome: Progressing  Goal: Promote skin healing  Outcome: Progressing

## 2024-10-06 ENCOUNTER — APPOINTMENT (OUTPATIENT)
Dept: RADIOLOGY | Facility: HOSPITAL | Age: 74
DRG: 521 | End: 2024-10-06
Payer: MEDICARE

## 2024-10-06 ENCOUNTER — OFFICE VISIT (OUTPATIENT)
Dept: SURGICAL ONCOLOGY | Facility: HOSPITAL | Age: 74
DRG: 521 | End: 2024-10-06
Payer: MEDICARE

## 2024-10-06 VITALS
BODY MASS INDEX: 22.38 KG/M2 | HEIGHT: 63 IN | DIASTOLIC BLOOD PRESSURE: 83 MMHG | WEIGHT: 126.32 LBS | TEMPERATURE: 97.3 F | OXYGEN SATURATION: 95 % | SYSTOLIC BLOOD PRESSURE: 165 MMHG | HEART RATE: 73 BPM | RESPIRATION RATE: 16 BRPM

## 2024-10-06 LAB
ALBUMIN SERPL BCP-MCNC: 2.4 G/DL (ref 3.4–5)
ALBUMIN SERPL BCP-MCNC: 2.4 G/DL (ref 3.4–5)
ANION GAP SERPL CALC-SCNC: 15 MMOL/L (ref 10–20)
ANION GAP SERPL CALC-SCNC: 15 MMOL/L (ref 10–20)
BASOPHILS # BLD AUTO: 0.03 X10*3/UL (ref 0–0.1)
BASOPHILS # BLD AUTO: 0.03 X10*3/UL (ref 0–0.1)
BASOPHILS NFR BLD AUTO: 0.2 %
BASOPHILS NFR BLD AUTO: 0.2 %
BUN SERPL-MCNC: 43 MG/DL (ref 6–23)
BUN SERPL-MCNC: 43 MG/DL (ref 6–23)
CALCIUM SERPL-MCNC: 8 MG/DL (ref 8.6–10.6)
CALCIUM SERPL-MCNC: 8 MG/DL (ref 8.6–10.6)
CHLORIDE SERPL-SCNC: 99 MMOL/L (ref 98–107)
CHLORIDE SERPL-SCNC: 99 MMOL/L (ref 98–107)
CO2 SERPL-SCNC: 23 MMOL/L (ref 21–32)
CO2 SERPL-SCNC: 23 MMOL/L (ref 21–32)
CREAT SERPL-MCNC: 2.04 MG/DL (ref 0.5–1.05)
CREAT SERPL-MCNC: 2.04 MG/DL (ref 0.5–1.05)
EGFRCR SERPLBLD CKD-EPI 2021: 25 ML/MIN/1.73M*2
EGFRCR SERPLBLD CKD-EPI 2021: 25 ML/MIN/1.73M*2
EOSINOPHIL # BLD AUTO: 0.05 X10*3/UL (ref 0–0.4)
EOSINOPHIL # BLD AUTO: 0.05 X10*3/UL (ref 0–0.4)
EOSINOPHIL NFR BLD AUTO: 0.4 %
EOSINOPHIL NFR BLD AUTO: 0.4 %
ERYTHROCYTE [DISTWIDTH] IN BLOOD BY AUTOMATED COUNT: 15.1 % (ref 11.5–14.5)
ERYTHROCYTE [DISTWIDTH] IN BLOOD BY AUTOMATED COUNT: 15.1 % (ref 11.5–14.5)
GLUCOSE SERPL-MCNC: 113 MG/DL (ref 74–99)
GLUCOSE SERPL-MCNC: 113 MG/DL (ref 74–99)
HCT VFR BLD AUTO: 31 % (ref 36–46)
HCT VFR BLD AUTO: 31 % (ref 36–46)
HGB BLD-MCNC: 10.1 G/DL (ref 12–16)
HGB BLD-MCNC: 10.1 G/DL (ref 12–16)
IMM GRANULOCYTES # BLD AUTO: 0.12 X10*3/UL (ref 0–0.5)
IMM GRANULOCYTES # BLD AUTO: 0.12 X10*3/UL (ref 0–0.5)
IMM GRANULOCYTES NFR BLD AUTO: 1 % (ref 0–0.9)
IMM GRANULOCYTES NFR BLD AUTO: 1 % (ref 0–0.9)
LYMPHOCYTES # BLD AUTO: 0.84 X10*3/UL (ref 0.8–3)
LYMPHOCYTES # BLD AUTO: 0.84 X10*3/UL (ref 0.8–3)
LYMPHOCYTES NFR BLD AUTO: 6.9 %
LYMPHOCYTES NFR BLD AUTO: 6.9 %
MAGNESIUM SERPL-MCNC: 1.91 MG/DL (ref 1.6–2.4)
MAGNESIUM SERPL-MCNC: 1.91 MG/DL (ref 1.6–2.4)
MCH RBC QN AUTO: 30.9 PG (ref 26–34)
MCH RBC QN AUTO: 30.9 PG (ref 26–34)
MCHC RBC AUTO-ENTMCNC: 32.6 G/DL (ref 32–36)
MCHC RBC AUTO-ENTMCNC: 32.6 G/DL (ref 32–36)
MCV RBC AUTO: 95 FL (ref 80–100)
MCV RBC AUTO: 95 FL (ref 80–100)
MONOCYTES # BLD AUTO: 1.03 X10*3/UL (ref 0.05–0.8)
MONOCYTES # BLD AUTO: 1.03 X10*3/UL (ref 0.05–0.8)
MONOCYTES NFR BLD AUTO: 8.5 %
MONOCYTES NFR BLD AUTO: 8.5 %
NEUTROPHILS # BLD AUTO: 10.11 X10*3/UL (ref 1.6–5.5)
NEUTROPHILS # BLD AUTO: 10.11 X10*3/UL (ref 1.6–5.5)
NEUTROPHILS NFR BLD AUTO: 83 %
NEUTROPHILS NFR BLD AUTO: 83 %
NRBC BLD-RTO: 0 /100 WBCS (ref 0–0)
NRBC BLD-RTO: 0 /100 WBCS (ref 0–0)
PHOSPHATE SERPL-MCNC: 3.2 MG/DL (ref 2.5–4.9)
PHOSPHATE SERPL-MCNC: 3.2 MG/DL (ref 2.5–4.9)
PLATELET # BLD AUTO: 233 X10*3/UL (ref 150–450)
PLATELET # BLD AUTO: 233 X10*3/UL (ref 150–450)
POTASSIUM SERPL-SCNC: 4.9 MMOL/L (ref 3.5–5.3)
POTASSIUM SERPL-SCNC: 4.9 MMOL/L (ref 3.5–5.3)
RBC # BLD AUTO: 3.27 X10*6/UL (ref 4–5.2)
RBC # BLD AUTO: 3.27 X10*6/UL (ref 4–5.2)
SODIUM SERPL-SCNC: 132 MMOL/L (ref 136–145)
SODIUM SERPL-SCNC: 132 MMOL/L (ref 136–145)
WBC # BLD AUTO: 12.2 X10*3/UL (ref 4.4–11.3)
WBC # BLD AUTO: 12.2 X10*3/UL (ref 4.4–11.3)

## 2024-10-06 PROCEDURE — 85025 COMPLETE CBC W/AUTO DIFF WBC: CPT

## 2024-10-06 PROCEDURE — 80069 RENAL FUNCTION PANEL: CPT

## 2024-10-06 PROCEDURE — 1170000001 HC PRIVATE ONCOLOGY ROOM DAILY

## 2024-10-06 PROCEDURE — 76770 US EXAM ABDO BACK WALL COMP: CPT | Performed by: RADIOLOGY

## 2024-10-06 PROCEDURE — 93010 ELECTROCARDIOGRAM REPORT: CPT | Performed by: INTERNAL MEDICINE

## 2024-10-06 PROCEDURE — 99233 SBSQ HOSP IP/OBS HIGH 50: CPT

## 2024-10-06 PROCEDURE — 2500000001 HC RX 250 WO HCPCS SELF ADMINISTERED DRUGS (ALT 637 FOR MEDICARE OP)

## 2024-10-06 PROCEDURE — 2500000002 HC RX 250 W HCPCS SELF ADMINISTERED DRUGS (ALT 637 FOR MEDICARE OP, ALT 636 FOR OP/ED)

## 2024-10-06 PROCEDURE — 97116 GAIT TRAINING THERAPY: CPT | Mod: GP,CQ

## 2024-10-06 PROCEDURE — 93005 ELECTROCARDIOGRAM TRACING: CPT

## 2024-10-06 PROCEDURE — 2500000004 HC RX 250 GENERAL PHARMACY W/ HCPCS (ALT 636 FOR OP/ED)

## 2024-10-06 PROCEDURE — 94640 AIRWAY INHALATION TREATMENT: CPT

## 2024-10-06 PROCEDURE — 76770 US EXAM ABDO BACK WALL COMP: CPT

## 2024-10-06 PROCEDURE — 83735 ASSAY OF MAGNESIUM: CPT

## 2024-10-06 RX ORDER — TALC
3 POWDER (GRAM) TOPICAL NIGHTLY
Status: DISPENSED | OUTPATIENT
Start: 2024-10-06

## 2024-10-06 RX ORDER — IPRATROPIUM BROMIDE AND ALBUTEROL SULFATE 2.5; .5 MG/3ML; MG/3ML
3 SOLUTION RESPIRATORY (INHALATION)
Status: DISPENSED | OUTPATIENT
Start: 2024-10-06

## 2024-10-06 RX ADMIN — CYANOCOBALAMIN TAB 1000 MCG 1000 MCG: 1000 TAB at 08:14

## 2024-10-06 RX ADMIN — METOPROLOL TARTRATE 50 MG: 50 TABLET, FILM COATED ORAL at 11:43

## 2024-10-06 RX ADMIN — HEPARIN SODIUM 5000 UNITS: 5000 INJECTION, SOLUTION INTRAVENOUS; SUBCUTANEOUS at 08:14

## 2024-10-06 RX ADMIN — IPRATROPIUM BROMIDE AND ALBUTEROL SULFATE 3 ML: .5; 3 SOLUTION RESPIRATORY (INHALATION) at 13:30

## 2024-10-06 RX ADMIN — IPRATROPIUM BROMIDE AND ALBUTEROL SULFATE 3 ML: .5; 3 SOLUTION RESPIRATORY (INHALATION) at 21:35

## 2024-10-06 RX ADMIN — DULOXETINE HYDROCHLORIDE 30 MG: 30 CAPSULE, DELAYED RELEASE ORAL at 08:14

## 2024-10-06 RX ADMIN — Medication 3 MG: at 21:28

## 2024-10-06 RX ADMIN — PANCRELIPASE 2 CAPSULE: 120000; 24000; 76000 CAPSULE, DELAYED RELEASE PELLETS ORAL at 17:26

## 2024-10-06 RX ADMIN — OXYCODONE HYDROCHLORIDE 10 MG: 5 TABLET ORAL at 08:14

## 2024-10-06 RX ADMIN — OXYCODONE HYDROCHLORIDE 10 MG: 5 TABLET ORAL at 21:32

## 2024-10-06 RX ADMIN — METOPROLOL TARTRATE 50 MG: 50 TABLET, FILM COATED ORAL at 17:25

## 2024-10-06 RX ADMIN — GABAPENTIN 300 MG: 300 CAPSULE ORAL at 21:28

## 2024-10-06 RX ADMIN — GABAPENTIN 300 MG: 300 CAPSULE ORAL at 15:19

## 2024-10-06 RX ADMIN — METOPROLOL SUCCINATE 50 MG: 50 TABLET, EXTENDED RELEASE ORAL at 08:14

## 2024-10-06 RX ADMIN — METOPROLOL TARTRATE 50 MG: 50 TABLET, FILM COATED ORAL at 22:39

## 2024-10-06 RX ADMIN — OXYCODONE HYDROCHLORIDE 10 MG: 5 TABLET ORAL at 15:19

## 2024-10-06 RX ADMIN — PANCRELIPASE 2 CAPSULE: 120000; 24000; 76000 CAPSULE, DELAYED RELEASE PELLETS ORAL at 08:14

## 2024-10-06 RX ADMIN — PANCRELIPASE 2 CAPSULE: 120000; 24000; 76000 CAPSULE, DELAYED RELEASE PELLETS ORAL at 11:43

## 2024-10-06 RX ADMIN — HEPARIN SODIUM 5000 UNITS: 5000 INJECTION, SOLUTION INTRAVENOUS; SUBCUTANEOUS at 21:28

## 2024-10-06 RX ADMIN — GABAPENTIN 300 MG: 300 CAPSULE ORAL at 08:14

## 2024-10-06 RX ADMIN — HEPARIN SODIUM 5000 UNITS: 5000 INJECTION, SOLUTION INTRAVENOUS; SUBCUTANEOUS at 17:26

## 2024-10-06 RX ADMIN — IPRATROPIUM BROMIDE AND ALBUTEROL SULFATE 3 ML: .5; 3 SOLUTION RESPIRATORY (INHALATION) at 08:34

## 2024-10-06 RX ADMIN — PANTOPRAZOLE SODIUM 40 MG: 40 TABLET, DELAYED RELEASE ORAL at 08:14

## 2024-10-06 RX ADMIN — OXYCODONE HYDROCHLORIDE 10 MG: 5 TABLET ORAL at 03:24

## 2024-10-06 ASSESSMENT — PAIN - FUNCTIONAL ASSESSMENT
PAIN_FUNCTIONAL_ASSESSMENT: 0-10

## 2024-10-06 ASSESSMENT — PAIN SCALES - GENERAL
PAINLEVEL_OUTOF10: 5 - MODERATE PAIN
PAINLEVEL_OUTOF10: 7
PAINLEVEL_OUTOF10: 5 - MODERATE PAIN
PAINLEVEL_OUTOF10: 7
PAINLEVEL_OUTOF10: 7
PAINLEVEL_OUTOF10: 3
PAINLEVEL_OUTOF10: 4

## 2024-10-06 ASSESSMENT — COGNITIVE AND FUNCTIONAL STATUS - GENERAL
TURNING FROM BACK TO SIDE WHILE IN FLAT BAD: A LITTLE
DRESSING REGULAR UPPER BODY CLOTHING: A LITTLE
DRESSING REGULAR LOWER BODY CLOTHING: A LITTLE
MOVING TO AND FROM BED TO CHAIR: A LITTLE
WALKING IN HOSPITAL ROOM: A LOT
EATING MEALS: A LITTLE
CLIMB 3 TO 5 STEPS WITH RAILING: A LOT
MOBILITY SCORE: 16
CLIMB 3 TO 5 STEPS WITH RAILING: A LOT
MOVING FROM LYING ON BACK TO SITTING ON SIDE OF FLAT BED WITH BEDRAILS: A LITTLE
HELP NEEDED FOR BATHING: A LITTLE
STANDING UP FROM CHAIR USING ARMS: A LITTLE
STANDING UP FROM CHAIR USING ARMS: A LITTLE
MOVING TO AND FROM BED TO CHAIR: A LITTLE
PERSONAL GROOMING: A LITTLE
TURNING FROM BACK TO SIDE WHILE IN FLAT BAD: A LITTLE
MOVING FROM LYING ON BACK TO SITTING ON SIDE OF FLAT BED WITH BEDRAILS: A LITTLE
TOILETING: A LITTLE
WALKING IN HOSPITAL ROOM: A LITTLE
MOBILITY SCORE: 17
DAILY ACTIVITIY SCORE: 18

## 2024-10-06 ASSESSMENT — PAIN DESCRIPTION - LOCATION
LOCATION: KNEE
LOCATION: ABDOMEN
LOCATION: ABDOMEN

## 2024-10-06 ASSESSMENT — PAIN DESCRIPTION - ORIENTATION
ORIENTATION: RIGHT
ORIENTATION: LEFT

## 2024-10-06 NOTE — PROGRESS NOTES
Allyson Armendariz is a 73 y.o. female on day 7 of admission presenting with Pancreatic cancer (Multi).    Subjective   No acute events overnight. This AM, patient reports feeling somewhat confused and also short of breath. She feels that she may have some chest tightness and feels like she needs her home inhalers, she is unsure of what she uses at home. She denies chest pain and states that it feels more like difficulty with taking a full breath. She also endorses some confusion and poor sleep and states that she is now experiencing her cancer related pain in the center of her abdomen.    She feels that she is urinating normally and having regular bowel movements. She was able to walk from gurney to chair after renal US this AM with assistance.    Denies fevers, chills, leg swelling, urinary retention/incontinence.    Objective   Last Recorded Vitals  /85   Pulse 99   Temp 36.2 °C (97.2 °F)   Resp 18   Wt 57.3 kg (126 lb 5.2 oz)   SpO2 96%   Intake/Output last 3 Shifts:    Intake/Output Summary (Last 24 hours) at 10/6/2024 0659  Last data filed at 10/6/2024 0614  Gross per 24 hour   Intake --   Output 1600 ml   Net -1600 ml   Admission Weight  Weight: 57.3 kg (126 lb 6.4 oz) (09/29/24 2156)  Daily Weight  10/02/24 : 57.3 kg (126 lb 5.2 oz)    Image Results  ECG 12 Lead  Sinus rhythm with short GA with Premature atrial complexes  Otherwise normal ECG  No previous ECGs available    Physical Exam  General: well-appearing, no acute distress, resting comfortably in bed  HEENT: normocephalic, atraumatic  Cardio: regular rate and rhythm, normal S1 and S2, no murmurs  Pulm: some bilateral wheezing, breathing comfortably on room air  Abd: normal, active bowel sounds; soft, non-tender, non-distended  Extremities: no peripheral edema, scars, or lesions  Neuro: alert and oriented to person, place, and time, CN II-XII grossly intact  Psych: mood and affect are appropriate    Labs  Results for orders placed or performed  during the hospital encounter of 09/29/24 (from the past 24 hour(s))   Urine electrolytes   Result Value Ref Range    Sodium, Urine Random 68 mmol/L    Sodium/Creatinine Ratio 158 Not established. mmol/g Creat    Potassium, Urine Random 24 mmol/L    Potassium/Creatinine Ratio 56 Not established mmol/g Creat    Chloride, Urine Random 60 mmol/L    Chloride/Creatinine Ratio 139 38 - 318 mmol/g creat    Creatinine, Urine Random 43.1 20.0 - 320.0 mg/dL   CBC and Auto Differential   Result Value Ref Range    WBC 12.2 (H) 4.4 - 11.3 x10*3/uL    nRBC 0.0 0.0 - 0.0 /100 WBCs    RBC 3.27 (L) 4.00 - 5.20 x10*6/uL    Hemoglobin 10.1 (L) 12.0 - 16.0 g/dL    Hematocrit 31.0 (L) 36.0 - 46.0 %    MCV 95 80 - 100 fL    MCH 30.9 26.0 - 34.0 pg    MCHC 32.6 32.0 - 36.0 g/dL    RDW 15.1 (H) 11.5 - 14.5 %    Platelets 233 150 - 450 x10*3/uL    Neutrophils % 83.0 40.0 - 80.0 %    Immature Granulocytes %, Automated 1.0 (H) 0.0 - 0.9 %    Lymphocytes % 6.9 13.0 - 44.0 %    Monocytes % 8.5 2.0 - 10.0 %    Eosinophils % 0.4 0.0 - 6.0 %    Basophils % 0.2 0.0 - 2.0 %    Neutrophils Absolute 10.11 (H) 1.60 - 5.50 x10*3/uL    Immature Granulocytes Absolute, Automated 0.12 0.00 - 0.50 x10*3/uL    Lymphocytes Absolute 0.84 0.80 - 3.00 x10*3/uL    Monocytes Absolute 1.03 (H) 0.05 - 0.80 x10*3/uL    Eosinophils Absolute 0.05 0.00 - 0.40 x10*3/uL    Basophils Absolute 0.03 0.00 - 0.10 x10*3/uL   Magnesium   Result Value Ref Range    Magnesium 1.91 1.60 - 2.40 mg/dL   Renal Function Panel   Result Value Ref Range    Glucose 113 (H) 74 - 99 mg/dL    Sodium 132 (L) 136 - 145 mmol/L    Potassium 4.9 3.5 - 5.3 mmol/L    Chloride 99 98 - 107 mmol/L    Bicarbonate 23 21 - 32 mmol/L    Anion Gap 15 10 - 20 mmol/L    Urea Nitrogen 43 (H) 6 - 23 mg/dL    Creatinine 2.04 (H) 0.50 - 1.05 mg/dL    eGFR 25 (L) >60 mL/min/1.73m*2    Calcium 8.0 (L) 8.6 - 10.6 mg/dL    Phosphorus 3.2 2.5 - 4.9 mg/dL    Albumin 2.4 (L) 3.4 - 5.0 g/dL       Assessment/Plan:  Allyson  Marcello is a 72 yo F w PMH of metastatic pancreatic adenocarcinoma (with mets to liver) and Crohn's disease presenting as a transfer from [facility] for further evaluation with orthopedic surgery after being found to have L complete pathologic femur fracture. Patient is planned for surgery with ortho tomorrow. Patient found to have systolic murmur on cardiac exam which is being further evaluated prior to surgery. Echo showed LV outflow tract obstruction, cardiology consulted for recs. Cardiology cleared after repeat limited echo showed improvement in LV outflow tract obstruction following blood transfusion. Patient underwent L hemiarthroplasty on 10/2. Patient now having some urinary retention, worsening kidney function, and was noted to be hypotensive overnight and this AM though not complaining of lightheadedness or dizziness. JETHRO slightly improved today, possibly pre-renal vs ATN. Renal US showing hydronephrosis. Patient shortness of breath improved after duonebs and EKG showing some concern for atrial fibrillation with rates 90s-100s.     Updates 10/06  - will encourage incentive spirometry  - wound care: mepilex for 7 days, after 7 days, will leave to open air  - on subcutaneous heparin (d/t decreased GFR) starting today given concern for bleeding yesterday  - patient will require 6 weeks of DVT prophylaxis per orthopedic surgery  - duoneb q6h PRN for shortness of breath/wheezing  - started melatonin 3 mg nightly  - spoke with cardiology regarding EKG, recommended increasing metoprolol dose back to metop tartrate 50 mg TID dosing and then see if she can tolerate metoprolol tartrate 50 mg q6h so that she can be discharged on metoprolol succinate 200 mg daily, goal HR is 60s     #Complete pathologic fracture of L femur  Patient had significant pain in L leg for past several months with negative imaging. Found to have pathologic fracture of L femur on CT scans at [facility] (scans can now be found in our system).   PLAN:  -  s/p surgery with ortho 10/2  - s/p ancef 2 g q8h x 3 doses per ortho recs for prophylaxis  - will encourage incentive spirometry  - wound care: mepilex for 7 days, after 7 days, will leave to open air  - will initiate subcutaneous heparin (d/t decreased GFR) starting tonight  - patient will require 6 weeks of DVT prophylaxis per orthopedic surgery  - PT/OT recommending home w home health     Ortho recs: pt should be weightbearing as tolerated until first follow up appointment. Patient will require 6 weeks total of DVT prophylaxis from an orthopedic standpoint, if ok per primary team. Patient currently has mepilex x1 on surgical site. Dressing should be removed POD7. Please send home with Calcium/Vitamin D 500mg-400IU BID for 6 weeks. Patient should follow up w/ Dr. Alcala 2 weeks after surgery for post-operative appointment (patient may call [phone] to schedule). Please page with questions.      #Holosystolic Murmur  #Atrial Fibrillation  Patient found to have holosystolic murmur on exam. Patient does not have known history of valvular dysfunction. Echo showed LV outflow obstruction that improved on repeat echo following transfusion. Cardiology consulted and recommended metoprolol tartrate 50 TID. On 10/5, converted to metoprolol succinate 50 daily. EKG 10/6 AM showing possible atrial fibrillation with rates 90s-100s.  PLAN:  - spoke with cardiology regarding EKG, recommended increasing metoprolol dose back to metop tartrate 50 mg TID dosing and then see if she can tolerate metoprolol tartrate 50 mg q6h so that she can be discharged on metoprolol succinate 200 mg daily, goal HR is 60s     #Metastatic Pancreatic Adenocarcinoma  Patient has metastatic pancreatic adenocarcinoma w mets to the liver. Follows with Dr. Hightower at [facility]. S/p gem/abraxane, which was stopped August 2024. Plan was to biopsy liver lesions with EUS to guide further treatment, but patient's labs are not concerning for obstruction.  PLAN:  -  continue vitamin B and D and creon 06318 TID  - pain regimen: duloxetine 30 mg daily, gabapentin 300 mg TID, acetaminophen 975 mg q6h PRN mild pain, oxycodone 10 mg q4h PRN severe pain  - will consider supportive oncology consult if patient has worsening pain     This patient has a central line              Reason for the central line remaining today? Parenteral medication     F: PRN  E: K > 4, Mg > 2  N: regular diet  A: mediport  GI: pantoprazole, miralax, senna  DVT: subcutaneous heparin  Abx: ancef (10/2-10/3)  Dispo: home with home health  Pain: duloxetine 30 mg daily, gabapentin 300 mg TID, acetaminophen 975 mg q6h PRN mild pain, oxycodone 10 mg q4h PRN severe pain     Code: DNR/DNI (confirmed on admission)  NOK: Oriana (, [phone]), Shameka (daughter, [phone])     Patient seen and discussed with attending, Dr. Rahel Maldonado MD  Internal Medicine PGY1  [facility] Team [id number]

## 2024-10-06 NOTE — PROGRESS NOTES
Physical Therapy    Physical Therapy Treatment    Patient Name: Allyson Armendariz  MRN: [id number]  Department: [facility]  Room: [facility]  Today's Date: 10/6/2024  Time Calculation  Start Time: 1516  Stop Time: 1545  Time Calculation (min): 29 min         Assessment/Plan   PT Assessment  PT Assessment Results: Decreased strength, Impaired balance, Decreased endurance, Decreased mobility, Pain  End of Session Communication: Bedside nurse  End of Session Patient Position: Bed, 3 rail up, Alarm off, not on at start of session  PT Plan  Inpatient/Swing Bed or Outpatient: Inpatient  PT Plan  Treatment/Interventions: Bed mobility, Transfer training, Gait training, Stair training, Balance training, Neuromuscular re-education, Strengthening, Endurance training, Therapeutic exercise, Therapeutic activity, Home exercise program  PT Plan: Ongoing PT  PT Frequency: Daily  PT Discharge Recommendations: Low intensity level of continued care (pt would benefit from high intensity but is requesting low intensity PT)  Equipment Recommended upon Discharge: Wheeled walker  PT Recommended Transfer Status: Assistive device, Stand by assist  PT - OK to Discharge: Yes (eval complete and discharge recommendations made)      General Visit Information:   PT  Visit  PT Received On: 10/06/24  General  Prior to Session Communication: Bedside nurse  Patient Position Received: Bed, 3 rail up, Alarm off, not on at start of session  General Comment: Pt supine in bed upon arrival. Pt very pleasant and agreeable to therapy. Pt stated she has been having pain in her medial knee.    Subjective   Precautions:  Precautions  LE Weight Bearing Status: Weight Bearing as Tolerated  Medical Precautions: Fall precautions    Vital Signs (Past 2hrs)        Date/Time Vitals Session Patient Position Pulse Resp SpO2 BP MAP (mmHg)    10/06/24 1516 Pre PT  --  89  --  98 %  --  --                         Objective   Pain:  Pain Assessment  Pain Assessment: 0-10  0-10  (Numeric) Pain Score: 7  Pain Type: Acute pain  Pain Location: Knee (medial)  Pain Orientation: Left  Pain Interventions: Cold pack  Response to Interventions: Decreases  Cognition:  Cognition  Overall Cognitive Status: Within Functional Limits  Coordination:       Activity Tolerance:  Activity Tolerance  Endurance: Tolerates 30 min exercise with multiple rests  Treatments:  Bed Mobility  Bed Mobility: Yes  Bed Mobility 1  Bed Mobility 1: Supine to sitting  Level of Assistance 1: Minimum assistance  Bed Mobility Comments 1: Cues for hand placement and B LE management.  Bed Mobility 2  Bed Mobility  2: Sitting to supine  Level of Assistance 2: Moderate assistance  Bed Mobility Comments 2: Assist with B LEs    Ambulation/Gait Training  Ambulation/Gait Training Performed: Yes  Ambulation/Gait Training 1  Surface 1: Level tile  Device 1: Rolling walker  Gait Support Devices: Gait belt  Assistance 1: Contact guard  Quality of Gait 1: Forward flexed posture, Antalgic, Decreased step length (Decreased millie)  Comments/Distance (ft) 1: 80' x2 with seated rest between trials. Pt requires increased time to complete.  Transfers  Transfer: Yes  Transfer 1  Transfer From 1: Sit to, Stand to  Transfer to 1: Stand, Sit  Technique 1: Sit to stand, Stand to sit  Transfer Device 1: Walker  Transfer Level of Assistance 1: Close supervision  Trials/Comments 1: Cues for hand placement with focus on avoiding pulling from FWW.    Stairs  Stairs: Yes  Stairs  Rails 1: Bilateral  Curb Step 1: No  Device 1: Wheeled walker  Assistance 1: Moderate assistance  Comment/Number of Steps 1: 1 step stool, pt cued in proper B LE sequencing. Pt with increased difficulty WB on L LE.    Outcome Measures:  [facility] Basic Mobility  Turning from your back to your side while in a flat bed without using bedrails: A little  Moving from lying on your back to sitting on the side of a flat bed without using bedrails: A little  Moving to and from bed to chair  (including a wheelchair): A little  Standing up from a chair using your arms (e.g. wheelchair or bedside chair): A little  To walk in hospital room: A little  Climbing 3-5 steps with railing: A lot  Basic Mobility - Total Score: 17    Education Documentation  Home Exercise Program, taught by Tawana Hong PTA at 10/6/2024  3:53 PM.  Learner: Patient  Readiness: Acceptance  Method: Explanation  Response: Verbalizes Understanding    Mobility Training, taught by Tawana Hong PTA at 10/6/2024  3:53 PM.  Learner: Patient  Readiness: Acceptance  Method: Explanation  Response: Verbalizes Understanding    Education Comments  No comments found.        OP EDUCATION:       Encounter Problems       Encounter Problems (Active)       Balance       Pt will maintain static/dynamic standing balance x4 minutes with RW and modified independence to demonstrate improved balance (Progressing)       Start:  10/03/24    Expected End:  10/17/24               Mobility       Pt will complete bed mobility independently with HOB flat and no use of bed rails (Progressing)       Start:  10/03/24    Expected End:  10/17/24            Pt will amb >150ft with RW and modified independence in prep for safe discharge home  (Progressing)       Start:  10/03/24    Expected End:  10/17/24            Pt will a/descend 12 steps with 1 rail and modified independence in prep for safe home entry/to access bathroom (Progressing)       Start:  10/03/24    Expected End:  10/17/24               PT Transfers       Pt will transfer sit to stand with RW and modified independence in prep for out of bed mobility  (Progressing)       Start:  10/03/24    Expected End:  10/17/24               Pain - Adult

## 2024-10-06 NOTE — CARE PLAN
The patient's goals for the shift include      The clinical goals for the shift include pt will remain safe andfree from injury      Problem: Fall/Injury  Goal: Not fall by end of shift  Outcome: Progressing  Goal: Be free from injury by end of the shift  Outcome: Progressing  Goal: Verbalize understanding of personal risk factors for fall in the hospital  Outcome: Progressing  Goal: Verbalize understanding of risk factor reduction measures to prevent injury from fall in the home  Outcome: Progressing  Goal: Use assistive devices by end of the shift  Outcome: Progressing  Goal: Pace activities to prevent fatigue by end of the shift  Outcome: Progressing     Problem: Pain - Adult  Goal: Verbalizes/displays adequate comfort level or baseline comfort level  Outcome: Progressing     Problem: Safety - Adult  Goal: Free from fall injury  Outcome: Progressing     Problem: Discharge Planning  Goal: Discharge to home or other facility with appropriate resources  Outcome: Progressing     Problem: Chronic Conditions and Co-morbidities  Goal: Patient's chronic conditions and co-morbidity symptoms are monitored and maintained or improved  Outcome: Progressing     Problem: Skin  Goal: Decreased wound size/increased tissue granulation at next dressing change  Outcome: Progressing  Goal: Participates in plan/prevention/treatment measures  Outcome: Progressing  Goal: Prevent/manage excess moisture  Outcome: Progressing  Goal: Prevent/minimize sheer/friction injuries  Outcome: Progressing  Goal: Promote/optimize nutrition  Outcome: Progressing  Goal: Promote skin healing  Outcome: Progressing

## 2024-10-07 ENCOUNTER — APPOINTMENT (OUTPATIENT)
Dept: RADIOLOGY | Facility: HOSPITAL | Age: 74
DRG: 521 | End: 2024-10-07
Payer: MEDICARE

## 2024-10-07 LAB
ALBUMIN SERPL BCP-MCNC: 2.4 G/DL (ref 3.4–5)
ANION GAP SERPL CALC-SCNC: 12 MMOL/L (ref 10–20)
ATRIAL RATE: 98 BPM
BASOPHILS # BLD AUTO: 0.05 X10*3/UL (ref 0–0.1)
BASOPHILS NFR BLD AUTO: 0.4 %
BUN SERPL-MCNC: 39 MG/DL (ref 6–23)
CALCIUM SERPL-MCNC: 8.2 MG/DL (ref 8.6–10.6)
CHLORIDE SERPL-SCNC: 98 MMOL/L (ref 98–107)
CO2 SERPL-SCNC: 25 MMOL/L (ref 21–32)
CREAT SERPL-MCNC: 2.01 MG/DL (ref 0.5–1.05)
EGFRCR SERPLBLD CKD-EPI 2021: 25 ML/MIN/1.73M*2
EOSINOPHIL # BLD AUTO: 0.15 X10*3/UL (ref 0–0.4)
EOSINOPHIL NFR BLD AUTO: 1.2 %
ERYTHROCYTE [DISTWIDTH] IN BLOOD BY AUTOMATED COUNT: 14.6 % (ref 11.5–14.5)
GLUCOSE SERPL-MCNC: 115 MG/DL (ref 74–99)
HCT VFR BLD AUTO: 30.4 % (ref 36–46)
HGB BLD-MCNC: 10 G/DL (ref 12–16)
IMM GRANULOCYTES # BLD AUTO: 0.17 X10*3/UL (ref 0–0.5)
IMM GRANULOCYTES NFR BLD AUTO: 1.4 % (ref 0–0.9)
LYMPHOCYTES # BLD AUTO: 1.02 X10*3/UL (ref 0.8–3)
LYMPHOCYTES NFR BLD AUTO: 8.2 %
MAGNESIUM SERPL-MCNC: 1.94 MG/DL (ref 1.6–2.4)
MCH RBC QN AUTO: 31.4 PG (ref 26–34)
MCHC RBC AUTO-ENTMCNC: 32.9 G/DL (ref 32–36)
MCV RBC AUTO: 96 FL (ref 80–100)
MONOCYTES # BLD AUTO: 1.15 X10*3/UL (ref 0.05–0.8)
MONOCYTES NFR BLD AUTO: 9.2 %
NEUTROPHILS # BLD AUTO: 9.93 X10*3/UL (ref 1.6–5.5)
NEUTROPHILS NFR BLD AUTO: 79.6 %
NRBC BLD-RTO: 0 /100 WBCS (ref 0–0)
P AXIS: 36 DEGREES
PHOSPHATE SERPL-MCNC: 3.8 MG/DL (ref 2.5–4.9)
PLATELET # BLD AUTO: 256 X10*3/UL (ref 150–450)
POTASSIUM SERPL-SCNC: 5 MMOL/L (ref 3.5–5.3)
PR INTERVAL: 104 MS
Q ONSET: 218 MS
QRS COUNT: 16 BEATS
QRS DURATION: 90 MS
QT INTERVAL: 360 MS
QTC CALCULATION(BAZETT): 459 MS
QTC FREDERICIA: 424 MS
R AXIS: 53 DEGREES
RBC # BLD AUTO: 3.18 X10*6/UL (ref 4–5.2)
SODIUM SERPL-SCNC: 130 MMOL/L (ref 136–145)
T AXIS: 66 DEGREES
T OFFSET: 398 MS
VENTRICULAR RATE: 98 BPM
WBC # BLD AUTO: 12.5 X10*3/UL (ref 4.4–11.3)

## 2024-10-07 PROCEDURE — 2500000004 HC RX 250 GENERAL PHARMACY W/ HCPCS (ALT 636 FOR OP/ED)

## 2024-10-07 PROCEDURE — 94640 AIRWAY INHALATION TREATMENT: CPT

## 2024-10-07 PROCEDURE — 99221 1ST HOSP IP/OBS SF/LOW 40: CPT

## 2024-10-07 PROCEDURE — 99233 SBSQ HOSP IP/OBS HIGH 50: CPT | Performed by: INTERNAL MEDICINE

## 2024-10-07 PROCEDURE — 83735 ASSAY OF MAGNESIUM: CPT

## 2024-10-07 PROCEDURE — 2500000001 HC RX 250 WO HCPCS SELF ADMINISTERED DRUGS (ALT 637 FOR MEDICARE OP)

## 2024-10-07 PROCEDURE — 80069 RENAL FUNCTION PANEL: CPT

## 2024-10-07 PROCEDURE — 85025 COMPLETE CBC W/AUTO DIFF WBC: CPT

## 2024-10-07 PROCEDURE — 1170000001 HC PRIVATE ONCOLOGY ROOM DAILY

## 2024-10-07 PROCEDURE — 2500000002 HC RX 250 W HCPCS SELF ADMINISTERED DRUGS (ALT 637 FOR MEDICARE OP, ALT 636 FOR OP/ED)

## 2024-10-07 RX ORDER — MAGNESIUM SULFATE HEPTAHYDRATE 40 MG/ML
2 INJECTION, SOLUTION INTRAVENOUS ONCE
Status: COMPLETED | OUTPATIENT
Start: 2024-10-07 | End: 2024-10-07

## 2024-10-07 RX ORDER — METOPROLOL TARTRATE 50 MG/1
50 TABLET ORAL EVERY 6 HOURS
Status: DISCONTINUED | OUTPATIENT
Start: 2024-10-07 | End: 2024-10-09 | Stop reason: HOSPADM

## 2024-10-07 RX ORDER — IPRATROPIUM BROMIDE AND ALBUTEROL SULFATE 2.5; .5 MG/3ML; MG/3ML
3 SOLUTION RESPIRATORY (INHALATION)
Status: DISCONTINUED | OUTPATIENT
Start: 2024-10-07 | End: 2024-10-08

## 2024-10-07 RX ADMIN — GABAPENTIN 300 MG: 300 CAPSULE ORAL at 14:36

## 2024-10-07 RX ADMIN — POLYETHYLENE GLYCOL 3350 17 G: 17 POWDER, FOR SOLUTION ORAL at 09:01

## 2024-10-07 RX ADMIN — OXYCODONE HYDROCHLORIDE 10 MG: 5 TABLET ORAL at 17:15

## 2024-10-07 RX ADMIN — METOPROLOL TARTRATE 50 MG: 50 TABLET, FILM COATED ORAL at 22:00

## 2024-10-07 RX ADMIN — METOPROLOL TARTRATE 50 MG: 50 TABLET, FILM COATED ORAL at 12:33

## 2024-10-07 RX ADMIN — OXYCODONE HYDROCHLORIDE 10 MG: 5 TABLET ORAL at 21:49

## 2024-10-07 RX ADMIN — MAGNESIUM SULFATE HEPTAHYDRATE 2 G: 40 INJECTION, SOLUTION INTRAVENOUS at 09:01

## 2024-10-07 RX ADMIN — GABAPENTIN 300 MG: 300 CAPSULE ORAL at 09:01

## 2024-10-07 RX ADMIN — PANCRELIPASE 2 CAPSULE: 120000; 24000; 76000 CAPSULE, DELAYED RELEASE PELLETS ORAL at 12:33

## 2024-10-07 RX ADMIN — OXYCODONE HYDROCHLORIDE 10 MG: 5 TABLET ORAL at 03:59

## 2024-10-07 RX ADMIN — OXYCODONE HYDROCHLORIDE 10 MG: 5 TABLET ORAL at 09:01

## 2024-10-07 RX ADMIN — PANCRELIPASE 2 CAPSULE: 120000; 24000; 76000 CAPSULE, DELAYED RELEASE PELLETS ORAL at 17:15

## 2024-10-07 RX ADMIN — IPRATROPIUM BROMIDE AND ALBUTEROL SULFATE 3 ML: .5; 3 SOLUTION RESPIRATORY (INHALATION) at 23:07

## 2024-10-07 RX ADMIN — ERGOCALCIFEROL 1250 MCG: 1.25 CAPSULE ORAL at 09:01

## 2024-10-07 RX ADMIN — IPRATROPIUM BROMIDE AND ALBUTEROL SULFATE 3 ML: .5; 3 SOLUTION RESPIRATORY (INHALATION) at 08:38

## 2024-10-07 RX ADMIN — METOPROLOL TARTRATE 50 MG: 50 TABLET, FILM COATED ORAL at 17:15

## 2024-10-07 RX ADMIN — DULOXETINE HYDROCHLORIDE 30 MG: 30 CAPSULE, DELAYED RELEASE ORAL at 09:01

## 2024-10-07 RX ADMIN — Medication 3 MG: at 21:49

## 2024-10-07 RX ADMIN — CYANOCOBALAMIN TAB 1000 MCG 1000 MCG: 1000 TAB at 09:01

## 2024-10-07 RX ADMIN — PANTOPRAZOLE SODIUM 40 MG: 40 TABLET, DELAYED RELEASE ORAL at 06:06

## 2024-10-07 RX ADMIN — HEPARIN SODIUM 5000 UNITS: 5000 INJECTION, SOLUTION INTRAVENOUS; SUBCUTANEOUS at 14:36

## 2024-10-07 RX ADMIN — IPRATROPIUM BROMIDE AND ALBUTEROL SULFATE 3 ML: .5; 3 SOLUTION RESPIRATORY (INHALATION) at 14:39

## 2024-10-07 RX ADMIN — GABAPENTIN 300 MG: 300 CAPSULE ORAL at 21:49

## 2024-10-07 RX ADMIN — HEPARIN SODIUM 5000 UNITS: 5000 INJECTION, SOLUTION INTRAVENOUS; SUBCUTANEOUS at 06:06

## 2024-10-07 RX ADMIN — SENNOSIDES 17.2 MG: 8.6 TABLET, FILM COATED ORAL at 21:49

## 2024-10-07 RX ADMIN — PANCRELIPASE 2 CAPSULE: 120000; 24000; 76000 CAPSULE, DELAYED RELEASE PELLETS ORAL at 09:01

## 2024-10-07 RX ADMIN — HEPARIN SODIUM 5000 UNITS: 5000 INJECTION, SOLUTION INTRAVENOUS; SUBCUTANEOUS at 21:49

## 2024-10-07 ASSESSMENT — PAIN SCALES - GENERAL
PAINLEVEL_OUTOF10: 5 - MODERATE PAIN
PAINLEVEL_OUTOF10: 5 - MODERATE PAIN
PAINLEVEL_OUTOF10: 7
PAINLEVEL_OUTOF10: 0 - NO PAIN
PAINLEVEL_OUTOF10: 7
PAINLEVEL_OUTOF10: 4

## 2024-10-07 ASSESSMENT — COGNITIVE AND FUNCTIONAL STATUS - GENERAL
DRESSING REGULAR LOWER BODY CLOTHING: A LITTLE
TOILETING: A LITTLE
STANDING UP FROM CHAIR USING ARMS: A LITTLE
DRESSING REGULAR LOWER BODY CLOTHING: A LITTLE
MOVING FROM LYING ON BACK TO SITTING ON SIDE OF FLAT BED WITH BEDRAILS: A LITTLE
PERSONAL GROOMING: A LITTLE
TOILETING: A LITTLE
TURNING FROM BACK TO SIDE WHILE IN FLAT BAD: A LITTLE
PERSONAL GROOMING: A LITTLE
MOBILITY SCORE: 18
MOVING TO AND FROM BED TO CHAIR: A LITTLE
MOVING TO AND FROM BED TO CHAIR: A LITTLE
DRESSING REGULAR UPPER BODY CLOTHING: A LITTLE
DAILY ACTIVITIY SCORE: 19
CLIMB 3 TO 5 STEPS WITH RAILING: A LITTLE
WALKING IN HOSPITAL ROOM: A LITTLE
WALKING IN HOSPITAL ROOM: A LITTLE
CLIMB 3 TO 5 STEPS WITH RAILING: A LITTLE
MOBILITY SCORE: 19
HELP NEEDED FOR BATHING: A LITTLE
DAILY ACTIVITIY SCORE: 19
TURNING FROM BACK TO SIDE WHILE IN FLAT BAD: A LITTLE
DRESSING REGULAR UPPER BODY CLOTHING: A LITTLE
STANDING UP FROM CHAIR USING ARMS: A LITTLE
HELP NEEDED FOR BATHING: A LITTLE

## 2024-10-07 ASSESSMENT — PAIN DESCRIPTION - DESCRIPTORS: DESCRIPTORS: ACHING

## 2024-10-07 ASSESSMENT — PAIN - FUNCTIONAL ASSESSMENT
PAIN_FUNCTIONAL_ASSESSMENT: 0-10

## 2024-10-07 ASSESSMENT — ACTIVITIES OF DAILY LIVING (ADL): EFFECT OF PAIN ON DAILY ACTIVITIES: HARDER TO SLEEP

## 2024-10-07 ASSESSMENT — PAIN SCALES - PAIN ASSESSMENT IN ADVANCED DEMENTIA (PAINAD): TOTALSCORE: MEDICATION (SEE MAR)

## 2024-10-07 ASSESSMENT — PAIN DESCRIPTION - LOCATION
LOCATION: ABDOMEN

## 2024-10-07 NOTE — CONSULTS
Reason For Consult  Right hydronephrosis    HPI  Allyson Armendariz is a 73 year old female with a significant pmh of HTN, afib, CKD (baseline sCr ~1.3-1.4), crohn's disease s/p ileocecectomy  and partial colectomy with terminal ilectomy and ileocolostomy , GERD, OA, recurrent UTI, and stage IV pancreatic cancer with biopsy proven liver mets and carcinomatosis (dx 2023, follows at [facility]) s/p gem/paclitaxel c/b anemia and neuropathy (d/c'd 2024 after disease progression) who presented as a transfer from [facility]  for onc ortho eval of pathologic complete left femur fracture. Imaging showed bone lesions c/w metastatic disease. She is now s/p left hemiarthroplasty on 10/2 with orthopedics/Dr. Alcala. Post-op course was c/b JETHRO (1.21 on admission, 2.01 10/7) with elevated PVRs in the 300s starting 10/3, now 345. Good urine output otherwise, ~1.2L/day. UA showed increasing proteinuria. Urine lytes were intra-renal. Urine culture was without growth. No microscopic hematuria. 10/6 RBUS for JETHRO workup showed moderate right hydronephrosis without evidence of nephrolithiasis. Patient stated she feels she is voiding to completion. Denied urinary symptoms including dysuria and hematuria, flank pain or suprapubic tenderness, and personal history of stones. Denied saddle anesthesia on presentation. Of note patient reported a few episodes of urge incontinence and decreased frequency several months ago, though stated this has since resolved. Urology was consulted for moderate right hydronephrosis.      Past Medical History afib, crohn's disease, CKD, GERD, OA, metastatic pancreatic cancer     Surgical History  x3 , hysterectomy w/BSO for endometriosis , ileocecectomy , partial colectomy with terminal ilectomy and ileocolostomy      Social History Former smoker, 30yr, 45pk/yrs, quit ~30yrs ago, denies etoh, ilictis     Family History Non-contributory     Allergies NKDA     Subjective Patient  stated she feels she is voiding to completion. Denied urinary symptoms including dysuria, hematuria, and flank pain or suprapubic tenderness. Denied saddle anesthesia on presentation. Denied personal history of stones. Of note patient reported a few episodes of urge incontinence and decreased frequency several months ago, though stated this has since resolved.      Objective      PE:   Constitutional: Alert, in NAD  HEENT: Normocephalic, atraumatic  Neuro: A&O x3  CV: Regular rate  Pulm: Non-laboured breathing   GI: Abdomen soft, non-distended, non-tender  : Voiding spontaneously  Skin: No lesions or discoloration  Musculoskeletal: CASS  Extremities: no edema or cyanosis noted     VS:   Vitals:    10/07/24 1141   BP: 149/74   Pulse: 72   Resp: 18   Temp: 36 °C (96.8 °F)   SpO2: 97%     Labs:   Results from last 72 hours   Lab Units 10/07/24  0414 10/06/24  0430 10/05/24  0611   CREATININE mg/dL 2.01* 2.04* 2.39*   HEMOGLOBIN g/dL 10.0* 10.1* 10.2*   WBC AUTO x10*3/uL 12.5* 12.2* 11.2   GLUCOSE mg/dL 115* 113* 109*   MAGNESIUM mg/dL 1.94 1.91 2.06   POTASSIUM mmol/L 5.0 4.9 4.8   CHLORIDE mmol/L 98 99 99   CALCIUM mg/dL 8.2* 8.0* 7.7*         Intake/Output Summary (Last 24 hours) at 10/7/2024 1415  Last data filed at 10/7/2024 1230  Gross per 24 hour   Intake 650 ml   Output 1800 ml   Net -1150 ml     Imagin24 CT c/a/p - No calculi or hydroureteronephrosis. Multifocal bilateral renal cortical scars/thinning. Prominent subcentimeter lymph nodes adjacent to the sigmoid colon likely reactive, otherwise no lymphadenopathy in the abdomen and pelvis. 1.6cm pancreatic head/neck mass with partial encasement of the portal-superior mesenteric vein confluence.    10/6/24 RBUS - Moderate right-sided hydronephrosis without sonographic evidence of nephrolithiasis. Slightly lobulated contour of the left kidney, which may represent persistent fetal lobulation, a normal anatomic variant.     Chart review: Multiple previous  UA with blood, 9530-3685. Remote recurrent klebsiella, e.coli and proteus UTI attributed to atrophic vaginitis on 3/8/21 office visit. Kidneys on us in 2004 consistent with ckd      Assessment: New moderate right hydronephrosis, possibly internal obstruction vs external compression from malignancy in the setting of progressive metastatic pancreatic cancer. JETHRO on CKD likely multifactorial with intrinsic component given intra-renal urine lytes and worsening proteinuria, as well as possible pre-renal component given BUN/cr >20, though may place solares for maximal decompression as PVR >300. No sign of stone on U/S, no history of stones. No microscopic hematuria. No CVA or suprapubic tenderness. Mild leukocytosis, urine culture without growth. Good urine output. Patient informed of and is in agreement with plan for solares placement and imaging.     Recommendations:     - Please place order for solares placement  - Obtain CT w/o contrast to further assess for etiology of hydronephrosis  - Monitor UOP, trend creatinine, strict I&Os, encourage oral hydration  - For urinary retention, please minimize narcotics and anticholinergic medications as able. Aggressive management of constipation (constipation often worsens retention)  - Urology to follow while workup underway, please page for questions/concerns     Patient assessment and plan to be discussed with Chief resident Dr. Villegas and attending Dr. Neeru Tinoco PA-C  Pager [phone]

## 2024-10-07 NOTE — PROGRESS NOTES
Subjective   No acute events overnight. Pt denied any symptoms including chest pain, abdominal pain or difficulty breathing this AM. Pt was resting comfortably in bed.      Objective     Vitals:    24 Hour Vitals  Temp:  [36 °C (96.8 °F)-36.5 °C (97.7 °F)] 36 °C (96.8 °F)  Heart Rate:  [59-73] 63  Resp:  [16-18] 18  BP: (149-187)/(59-86) 165/70    Temp (24hrs), Av.2 °C (97.2 °F), Min:36 °C (96.8 °F), Max:36.5 °C (97.7 °F)     24 hour Intake/Output    Intake/Output Summary (Last 24 hours) at 10/7/2024 1639  Last data filed at 10/7/2024 1422  Gross per 24 hour   Intake 770 ml   Output 1800 ml   Net -1030 ml        Physical exam:  General: well-appearing, no acute distress, resting comfortably in bed  HEENT: normocephalic, atraumatic  Cardio: regular rate and rhythm, normal S1 and S2, no murmurs  Pulm: some bilateral wheezing, breathing comfortably on room air  Abd: normal, active bowel sounds; soft, non-tender, non-distended  Extremities: no peripheral edema, scars, or lesions  Neuro: alert and oriented to person, place, and time, CN II-XII grossly intact  Psych: mood and affect are appropriate      Medications   Scheduled Medications  cyanocobalamin, 1,000 mcg, oral, Daily  DULoxetine, 30 mg, oral, Daily  ergocalciferol, 1,250 mcg, oral, Weekly  gabapentin, 300 mg, oral, TID  heparin (porcine), 5,000 Units, subcutaneous, q8h MURPHY  ipratropium-albuteroL, 3 mL, nebulization, TID  lipase-protease-amylase, 2 capsule, oral, TID  melatonin, 3 mg, oral, Nightly  metoprolol tartrate, 50 mg, oral, q6h  pantoprazole, 40 mg, oral, Daily before breakfast  polyethylene glycol, 17 g, oral, Daily  sennosides, 2 tablet, oral, Nightly     Continuous Medications    PRN Medications  PRN medications: acetaminophen, lidocaine-diphenhydraMINE-Maalox 1:1:1, oxyCODONE       Labs  CBC  Results from last 72 hours   Lab Units 10/07/24  0414 10/06/24  0430 10/05/24  0611   WBC AUTO x10*3/uL 12.5* 12.2* 11.2   HEMOGLOBIN g/dL 10.0* 10.1*  10.2*   HEMATOCRIT % 30.4* 31.0* 31.3*   PLATELETS AUTO x10*3/uL 256 233 206        BMP  Results from last 72 hours   Lab Units 10/07/24  0414 10/06/24  0430 10/05/24  0611   SODIUM mmol/L 130* 132* 132*   POTASSIUM mmol/L 5.0 4.9 4.8   CHLORIDE mmol/L 98 99 99   BUN mg/dL 39* 43* 50*   CREATININE mg/dL 2.01* 2.04* 2.39*   MAGNESIUM mg/dL 1.94 1.91 2.06   PHOSPHORUS mg/dL 3.8 3.2 3.7       Imaging:  === 09/29/24 ===    US RENAL COMPLETE    - Impression -  1. Moderate right-sided hydronephrosis without sonographic evidence  of nephrolithiasis.  2. Slightly lobulated contour of the left kidney, which may represent  persistent fetal lobulation, a normal anatomic variant.    I personally reviewed the images/study and I agree with the findings  as stated by Mckay Quick MD. This study was interpreted at  [facility], [place], [place]    MACRO:  None    Signed by: Arturo Arana 10/6/2024 8:32 AM  Dictation workstation:   [id number]   === 03/06/14 ===    CT ENTEROGRAPHY WITH CONTRAST          Assessment and plan:  Allyson Armendariz is a 72 yo F w PMH of metastatic pancreatic adenocarcinoma (with mets to liver) and Crohn's disease presenting as a transfer from [facility] for further evaluation with orthopedic surgery after being found to have L complete pathologic femur fracture. Patient is planned for surgery with ortho tomorrow. Patient found to have systolic murmur on cardiac exam which is being further evaluated prior to surgery. Echo showed LV outflow tract obstruction, cardiology consulted for recs. Cardiology cleared after repeat limited echo showed improvement in LV outflow tract obstruction following blood transfusion. Patient underwent L hemiarthroplasty on 10/2. Patient now having some urinary retention, worsening kidney function, and was noted to be hypotensive overnight and this AM though not complaining of lightheadedness or dizziness. JETHRO slightly improved today, possibly  pre-renal vs ATN. Renal US showing hydronephrosis. Patient shortness of breath improved after duonebs and EKG showing some concern for atrial fibrillation with rates 90s-100s. Cardiology recommended metop tartrate starting at 50 mg TID to q6 with goal HR 60s. Consulted urology for urinary retention and hydronephrosis.     Updates 10/07  - Urinary retention this AM with PVR >350, solares in. Cr plateau at 2.01. Consulted Urology, recommended foleys to decompress bladder. Likely JETHRO is intrinsic based on urine lytes. Hydronephrosis could be due to stone or possible RP disease process in setting of metastatic pancreatic cancer so will get a CT A/P without contrast.  - Increased metop tartrate 50 mg TID dosing to metoprolol tartrate 50 mg q6h so that she can be discharged on metoprolol succinate 200 mg daily, goal HR is 60s  - Start Calcium/Vitamin D 500mg-400IU BID for 6 weeks prior to discharge  - Start DVT prophylaxis for 6 weeks on discharge     #Complete pathologic fracture of L femur  Patient had significant pain in L leg for past several months with negative imaging. Found to have pathologic fracture of L femur on CT scans at [facility] (scans can now be found in our system).   PLAN:  - s/p surgery with ortho 10/2  - s/p ancef 2 g q8h x 3 doses per ortho recs for prophylaxis  - will encourage incentive spirometry. duoneb q6h PRN for shortness of breath/wheezing  - wound care: mepilex for 7 days, after 7 days, will leave to open air  - will initiate subcutaneous heparin (d/t decreased GFR) starting tonight. On subcutaneous heparin (d/t decreased GFR) starting today given concern for bleeding yesterday  - patient will require 6 weeks of DVT prophylaxis per orthopedic surgery  - PT/OT recommending home w home health     Ortho recs: pt should be weightbearing as tolerated until first follow up appointment. Patient will require 6 weeks total of DVT prophylaxis from an orthopedic standpoint, if ok per primary team. Patient  currently has mepilex x1 on surgical site. Dressing should be removed POD7. Please send home with Calcium/Vitamin D 500mg-400IU BID for 6 weeks. Patient should follow up w/ Dr. Alcala 2 weeks after surgery for post-operative appointment (patient may call [phone] to schedule). Please page with questions.      #Holosystolic Murmur  #Atrial Fibrillation  Patient found to have holosystolic murmur on exam. Patient does not have known history of valvular dysfunction. Echo showed LV outflow obstruction that improved on repeat echo following transfusion. Cardiology consulted and recommended metoprolol tartrate 50 TID. On 10/5, converted to metoprolol succinate 50 daily. EKG 10/6 AM showing possible atrial fibrillation with rates 90s-100s.  PLAN:  - Increased metop tartrate 50 mg TID dosing to metoprolol tartrate 50 mg q6h so that she can be discharged on metoprolol succinate 200 mg daily, goal HR is 60s     #Metastatic Pancreatic Adenocarcinoma  Patient has metastatic pancreatic adenocarcinoma w mets to the liver. Follows with Dr. Hightower at [facility]. S/p gem/abraxane, which was stopped August 2024. Plan was to biopsy liver lesions with EUS to guide further treatment, but patient's labs are not concerning for obstruction.  PLAN:  - continue vitamin B and D and creon 78043 TID  - pain regimen: duloxetine 30 mg daily, gabapentin 300 mg TID, acetaminophen 975 mg q6h PRN mild pain, oxycodone 10 mg q4h PRN severe pain  - will consider supportive oncology consult if patient has worsening pain     This patient has a central line              Reason for the central line remaining today? Parenteral medication     F: PRN  E: K > 4, Mg > 2  N: regular diet  A: mediport  GI: pantoprazole, miralax, senna  DVT: subcutaneous heparin  Abx: ancef (10/2-10/3)  Dispo: home with home health  Pain: duloxetine 30 mg daily, gabapentin 300 mg TID, acetaminophen 975 mg q6h PRN mild pain, oxycodone 10 mg q4h PRN severe pain     Code: DNR/DNI  (confirmed on admission)  NOK: Oriana (, [phone]), Shameka (daughter, [phone])     Patient seen and discussed with attending physician.    Anjali Umaña MD  PGY-1 Internal Medicine

## 2024-10-07 NOTE — CARE PLAN
The patient's goals for the shift include      The clinical goals for the shift include Patient will remain safe and free from injury      Problem: Fall/Injury  Goal: Not fall by end of shift  Outcome: Progressing  Goal: Be free from injury by end of the shift  Outcome: Progressing  Goal: Verbalize understanding of personal risk factors for fall in the hospital  Outcome: Progressing  Goal: Verbalize understanding of risk factor reduction measures to prevent injury from fall in the home  Outcome: Progressing  Goal: Use assistive devices by end of the shift  Outcome: Progressing  Goal: Pace activities to prevent fatigue by end of the shift  Outcome: Progressing     Problem: Pain - Adult  Goal: Verbalizes/displays adequate comfort level or baseline comfort level  Outcome: Progressing     Problem: Safety - Adult  Goal: Free from fall injury  Outcome: Progressing     Problem: Discharge Planning  Goal: Discharge to home or other facility with appropriate resources  Outcome: Progressing     Problem: Chronic Conditions and Co-morbidities  Goal: Patient's chronic conditions and co-morbidity symptoms are monitored and maintained or improved  Outcome: Progressing     Problem: Skin  Goal: Decreased wound size/increased tissue granulation at next dressing change  Outcome: Progressing  Goal: Participates in plan/prevention/treatment measures  Outcome: Progressing  Goal: Prevent/manage excess moisture  Outcome: Progressing  Goal: Prevent/minimize sheer/friction injuries  Outcome: Progressing  Goal: Promote/optimize nutrition  Outcome: Progressing  Goal: Promote skin healing  Outcome: Progressing

## 2024-10-08 ENCOUNTER — APPOINTMENT (OUTPATIENT)
Dept: RADIOLOGY | Facility: HOSPITAL | Age: 74
DRG: 521 | End: 2024-10-08
Payer: MEDICARE

## 2024-10-08 LAB
ALBUMIN SERPL BCP-MCNC: 2.2 G/DL (ref 3.4–5)
ANION GAP SERPL CALC-SCNC: 16 MMOL/L (ref 10–20)
BASOPHILS # BLD AUTO: 0.04 X10*3/UL (ref 0–0.1)
BASOPHILS NFR BLD AUTO: 0.3 %
BUN SERPL-MCNC: 36 MG/DL (ref 6–23)
CALCIUM SERPL-MCNC: 8.2 MG/DL (ref 8.6–10.6)
CHLORIDE SERPL-SCNC: 95 MMOL/L (ref 98–107)
CO2 SERPL-SCNC: 24 MMOL/L (ref 21–32)
CREAT SERPL-MCNC: 2.02 MG/DL (ref 0.5–1.05)
EGFRCR SERPLBLD CKD-EPI 2021: 25 ML/MIN/1.73M*2
EOSINOPHIL # BLD AUTO: 0.1 X10*3/UL (ref 0–0.4)
EOSINOPHIL NFR BLD AUTO: 0.8 %
ERYTHROCYTE [DISTWIDTH] IN BLOOD BY AUTOMATED COUNT: 14.4 % (ref 11.5–14.5)
GLUCOSE SERPL-MCNC: 129 MG/DL (ref 74–99)
HCT VFR BLD AUTO: 30.9 % (ref 36–46)
HGB BLD-MCNC: 10.1 G/DL (ref 12–16)
IMM GRANULOCYTES # BLD AUTO: 0.1 X10*3/UL (ref 0–0.5)
IMM GRANULOCYTES NFR BLD AUTO: 0.8 % (ref 0–0.9)
LYMPHOCYTES # BLD AUTO: 0.98 X10*3/UL (ref 0.8–3)
LYMPHOCYTES NFR BLD AUTO: 7.8 %
MAGNESIUM SERPL-MCNC: 2.22 MG/DL (ref 1.6–2.4)
MCH RBC QN AUTO: 31.4 PG (ref 26–34)
MCHC RBC AUTO-ENTMCNC: 32.7 G/DL (ref 32–36)
MCV RBC AUTO: 96 FL (ref 80–100)
MONOCYTES # BLD AUTO: 1.23 X10*3/UL (ref 0.05–0.8)
MONOCYTES NFR BLD AUTO: 9.8 %
NEUTROPHILS # BLD AUTO: 10.1 X10*3/UL (ref 1.6–5.5)
NEUTROPHILS NFR BLD AUTO: 80.5 %
NRBC BLD-RTO: 0 /100 WBCS (ref 0–0)
PHOSPHATE SERPL-MCNC: 4.4 MG/DL (ref 2.5–4.9)
PLATELET # BLD AUTO: 270 X10*3/UL (ref 150–450)
POTASSIUM SERPL-SCNC: 5 MMOL/L (ref 3.5–5.3)
RBC # BLD AUTO: 3.22 X10*6/UL (ref 4–5.2)
SODIUM SERPL-SCNC: 130 MMOL/L (ref 136–145)
WBC # BLD AUTO: 12.6 X10*3/UL (ref 4.4–11.3)

## 2024-10-08 PROCEDURE — 85025 COMPLETE CBC W/AUTO DIFF WBC: CPT

## 2024-10-08 PROCEDURE — 2500000004 HC RX 250 GENERAL PHARMACY W/ HCPCS (ALT 636 FOR OP/ED)

## 2024-10-08 PROCEDURE — 97116 GAIT TRAINING THERAPY: CPT | Mod: GP,CQ

## 2024-10-08 PROCEDURE — 2500000002 HC RX 250 W HCPCS SELF ADMINISTERED DRUGS (ALT 637 FOR MEDICARE OP, ALT 636 FOR OP/ED)

## 2024-10-08 PROCEDURE — 2500000001 HC RX 250 WO HCPCS SELF ADMINISTERED DRUGS (ALT 637 FOR MEDICARE OP)

## 2024-10-08 PROCEDURE — 83735 ASSAY OF MAGNESIUM: CPT

## 2024-10-08 PROCEDURE — 74176 CT ABD & PELVIS W/O CONTRAST: CPT

## 2024-10-08 PROCEDURE — 94640 AIRWAY INHALATION TREATMENT: CPT

## 2024-10-08 PROCEDURE — 99233 SBSQ HOSP IP/OBS HIGH 50: CPT

## 2024-10-08 PROCEDURE — 80069 RENAL FUNCTION PANEL: CPT

## 2024-10-08 PROCEDURE — 1170000001 HC PRIVATE ONCOLOGY ROOM DAILY

## 2024-10-08 PROCEDURE — 74176 CT ABD & PELVIS W/O CONTRAST: CPT | Performed by: RADIOLOGY

## 2024-10-08 PROCEDURE — 97530 THERAPEUTIC ACTIVITIES: CPT | Mod: GP,CQ

## 2024-10-08 RX ORDER — IPRATROPIUM BROMIDE AND ALBUTEROL SULFATE 2.5; .5 MG/3ML; MG/3ML
3 SOLUTION RESPIRATORY (INHALATION) EVERY 6 HOURS PRN
Status: DISCONTINUED | OUTPATIENT
Start: 2024-10-08 | End: 2024-10-09 | Stop reason: HOSPADM

## 2024-10-08 RX ORDER — SENNOSIDES 8.6 MG/1
2 TABLET ORAL 2 TIMES DAILY
Status: DISCONTINUED | OUTPATIENT
Start: 2024-10-08 | End: 2024-10-09 | Stop reason: HOSPADM

## 2024-10-08 RX ORDER — BISACODYL 10 MG/1
10 SUPPOSITORY RECTAL ONCE
Status: DISCONTINUED | OUTPATIENT
Start: 2024-10-08 | End: 2024-10-09 | Stop reason: HOSPADM

## 2024-10-08 RX ORDER — POLYETHYLENE GLYCOL 3350 17 G/17G
17 POWDER, FOR SOLUTION ORAL 2 TIMES DAILY
Status: DISCONTINUED | OUTPATIENT
Start: 2024-10-08 | End: 2024-10-09 | Stop reason: HOSPADM

## 2024-10-08 RX ADMIN — HEPARIN SODIUM 5000 UNITS: 5000 INJECTION, SOLUTION INTRAVENOUS; SUBCUTANEOUS at 12:59

## 2024-10-08 RX ADMIN — PANCRELIPASE 2 CAPSULE: 120000; 24000; 76000 CAPSULE, DELAYED RELEASE PELLETS ORAL at 16:49

## 2024-10-08 RX ADMIN — OXYCODONE HYDROCHLORIDE 10 MG: 5 TABLET ORAL at 16:49

## 2024-10-08 RX ADMIN — SENNOSIDES 17.2 MG: 8.6 TABLET, FILM COATED ORAL at 21:10

## 2024-10-08 RX ADMIN — PANCRELIPASE 2 CAPSULE: 120000; 24000; 76000 CAPSULE, DELAYED RELEASE PELLETS ORAL at 12:58

## 2024-10-08 RX ADMIN — DULOXETINE HYDROCHLORIDE 30 MG: 30 CAPSULE, DELAYED RELEASE ORAL at 08:31

## 2024-10-08 RX ADMIN — METOPROLOL TARTRATE 50 MG: 50 TABLET, FILM COATED ORAL at 04:40

## 2024-10-08 RX ADMIN — CYANOCOBALAMIN TAB 1000 MCG 1000 MCG: 1000 TAB at 08:31

## 2024-10-08 RX ADMIN — HEPARIN SODIUM 5000 UNITS: 5000 INJECTION, SOLUTION INTRAVENOUS; SUBCUTANEOUS at 05:05

## 2024-10-08 RX ADMIN — IPRATROPIUM BROMIDE AND ALBUTEROL SULFATE 3 ML: .5; 3 SOLUTION RESPIRATORY (INHALATION) at 20:23

## 2024-10-08 RX ADMIN — POLYETHYLENE GLYCOL 3350 17 G: 17 POWDER, FOR SOLUTION ORAL at 08:31

## 2024-10-08 RX ADMIN — GABAPENTIN 300 MG: 300 CAPSULE ORAL at 08:31

## 2024-10-08 RX ADMIN — PANTOPRAZOLE SODIUM 40 MG: 40 TABLET, DELAYED RELEASE ORAL at 08:31

## 2024-10-08 RX ADMIN — HEPARIN SODIUM 5000 UNITS: 5000 INJECTION, SOLUTION INTRAVENOUS; SUBCUTANEOUS at 21:10

## 2024-10-08 RX ADMIN — PANCRELIPASE 2 CAPSULE: 120000; 24000; 76000 CAPSULE, DELAYED RELEASE PELLETS ORAL at 08:31

## 2024-10-08 RX ADMIN — GABAPENTIN 300 MG: 300 CAPSULE ORAL at 21:10

## 2024-10-08 RX ADMIN — Medication 3 MG: at 21:10

## 2024-10-08 RX ADMIN — OXYCODONE HYDROCHLORIDE 10 MG: 5 TABLET ORAL at 08:42

## 2024-10-08 RX ADMIN — GABAPENTIN 300 MG: 300 CAPSULE ORAL at 14:52

## 2024-10-08 RX ADMIN — METOPROLOL TARTRATE 50 MG: 50 TABLET, FILM COATED ORAL at 10:44

## 2024-10-08 RX ADMIN — OXYCODONE HYDROCHLORIDE 10 MG: 5 TABLET ORAL at 04:39

## 2024-10-08 RX ADMIN — METOPROLOL TARTRATE 50 MG: 50 TABLET, FILM COATED ORAL at 16:49

## 2024-10-08 ASSESSMENT — COGNITIVE AND FUNCTIONAL STATUS - GENERAL
DRESSING REGULAR LOWER BODY CLOTHING: A LITTLE
DRESSING REGULAR LOWER BODY CLOTHING: A LITTLE
HELP NEEDED FOR BATHING: A LITTLE
TURNING FROM BACK TO SIDE WHILE IN FLAT BAD: A LITTLE
TOILETING: A LITTLE
PERSONAL GROOMING: A LITTLE
MOBILITY SCORE: 17
WALKING IN HOSPITAL ROOM: A LOT
MOVING TO AND FROM BED TO CHAIR: A LITTLE
STANDING UP FROM CHAIR USING ARMS: A LITTLE
MOBILITY SCORE: 17
TURNING FROM BACK TO SIDE WHILE IN FLAT BAD: A LITTLE
DRESSING REGULAR UPPER BODY CLOTHING: A LITTLE
WALKING IN HOSPITAL ROOM: A LOT
TOILETING: A LITTLE
HELP NEEDED FOR BATHING: A LITTLE
DRESSING REGULAR UPPER BODY CLOTHING: A LITTLE
MOVING FROM LYING ON BACK TO SITTING ON SIDE OF FLAT BED WITH BEDRAILS: A LITTLE
MOVING TO AND FROM BED TO CHAIR: A LITTLE
CLIMB 3 TO 5 STEPS WITH RAILING: A LOT
TURNING FROM BACK TO SIDE WHILE IN FLAT BAD: A LITTLE
CLIMB 3 TO 5 STEPS WITH RAILING: A LOT
STANDING UP FROM CHAIR USING ARMS: A LITTLE
CLIMB 3 TO 5 STEPS WITH RAILING: A LOT
DAILY ACTIVITIY SCORE: 19
STANDING UP FROM CHAIR USING ARMS: A LITTLE
MOBILITY SCORE: 17
WALKING IN HOSPITAL ROOM: A LITTLE
PERSONAL GROOMING: A LITTLE
DAILY ACTIVITIY SCORE: 19
MOVING TO AND FROM BED TO CHAIR: A LITTLE

## 2024-10-08 ASSESSMENT — PAIN - FUNCTIONAL ASSESSMENT
PAIN_FUNCTIONAL_ASSESSMENT: 0-10

## 2024-10-08 ASSESSMENT — PAIN SCALES - GENERAL
PAINLEVEL_OUTOF10: 0 - NO PAIN
PAINLEVEL_OUTOF10: 7
PAINLEVEL_OUTOF10: 0 - NO PAIN
PAINLEVEL_OUTOF10: 2
PAINLEVEL_OUTOF10: 7
PAINLEVEL_OUTOF10: 7
PAINLEVEL_OUTOF10: 0 - NO PAIN
PAINLEVEL_OUTOF10: 3

## 2024-10-08 ASSESSMENT — ACTIVITIES OF DAILY LIVING (ADL)
LACK_OF_TRANSPORTATION: NO
LACK_OF_TRANSPORTATION: NO

## 2024-10-08 ASSESSMENT — PAIN DESCRIPTION - LOCATION
LOCATION: LEG
LOCATION: LEG

## 2024-10-08 ASSESSMENT — PAIN DESCRIPTION - DESCRIPTORS: DESCRIPTORS: ACHING

## 2024-10-08 NOTE — PROGRESS NOTES
10/08/24 1531   Discharge Planning   Living Arrangements Spouse/significant other   Support Systems Spouse/significant other   Type of Residence Private residence   Number of Stairs to Enter Residence 1   Number of Stairs Within Residence 13   Do you have animals or pets at home? No   Home or Post Acute Services In home services;Post acute facilities (Rehab/SNF/etc)   Expected Discharge Disposition Home   Financial Resource Strain   How hard is it for you to pay for the very basics like food, housing, medical care, and heating? Not hard   Housing Stability   In the last 12 months, was there a time when you were not able to pay the mortgage or rent on time? N   In the past 12 months, how many times have you moved where you were living? 0   At any time in the past 12 months, were you homeless or living in a shelter (including now)? N   Transportation Needs   In the past 12 months, has lack of transportation kept you from medical appointments or from getting medications? no   In the past 12 months, has lack of transportation kept you from meetings, work, or from getting things needed for daily living? No     Allyson Armendariz is a 74 yo F w PMH of metastatic pancreatic adenocarcinoma (with mets to liver) and Crohn's disease presenting as a transfer from [facility] [place] for further evaluation with orthopedic surgery after being found to have L complete pathologic femur fracture. L hemiarthroplasty on 10/2.  Patient now having some urinary retention, worsening kidney function.     Plan per Medical Surgical Team: Patient having urinary retention episodes, and JETHRO pending urology recommendations. Monitoring creatinine.     TCC Note: [facility] unable to accept patient due to non [facility] PCP. Explained to patient reason [facility] unable to accept and provided patient with a list of Home Care agencies.  Notified patient/family that  [facility], [facility], [facility] and [facility] are able to [facility].  Patient  and family will choose a AOC and notifiy TCC. Care transitions will follow up with patient/family for AOC.  ADOD when medically stable. Suzanne Banda RN, TCC

## 2024-10-08 NOTE — SIGNIFICANT EVENT
MARIANA Armendariz is a 73 year old female with a significant pmh of stage IV pancreatic cancer with biopsy proven liver mets and carcinomatosis (dx 7/2023, follows at [facility]) s/p gem/paclitaxel (d/c'd 8/2024 after disease progression) who presented as a transfer from [facility] 9/29 for pathologic left femur fracture now s/p left hemiarthroplasty on 10/2 with ortho. Post-op course was c/b JETHRO on CKD (baseline sCr ~1.2-1.4, 1.21 on admission, 2.01 10/7) with elevated PVRs in the 300s starting 10/3, now 345. Good urine output otherwise, ~1.2L/day. UA showed increasing proteinuria. Urine lytes were intra-renal. Urine culture was without growth. No microscopic hematuria. 10/6 RBUS for JETHRO workup showed moderate right hydronephrosis without evidence of nephrolithiasis. Patient denied urinary symptoms and stated she feels she is voiding to completion. Urology was consulted for moderate right hydronephrosis, and recommended CT and solares placement 10/7.    Labs:  Creatinine   Date Value Ref Range Status   10/08/2024 2.02 (H) 0.50 - 1.05 mg/dL Final   10/07/2024 2.01 (H) 0.50 - 1.05 mg/dL Final   10/06/2024 2.04 (H) 0.50 - 1.05 mg/dL Final   10/05/2024 2.39 (H) 0.50 - 1.05 mg/dL Final   10/04/2024 2.10 (H) 0.50 - 1.05 mg/dL Final   10/03/2024 1.77 (H) 0.50 - 1.05 mg/dL Final   10/02/2024 1.27 (H) 0.50 - 1.05 mg/dL Final   10/01/2024 1.41 (H) 0.50 - 1.05 mg/dL Final   09/30/2024 1.21 (H) 0.50 - 1.05 mg/dL Final          Intake/Output Summary (Last 24 hours) at 10/8/2024 1557  Last data filed at 10/8/2024 1300  Gross per 24 hour   Intake 730 ml   Output 1800 ml   Net -1070 ml     Imaging: 10/8/24 CT - mild-to-moderate right hydroureteronephrosis to the level of the pelvis, tapering proximal to the urinary bladder, of uncertain etiology. The exact transition point is difficult to characterize given artifact from metallic clips in the pelvis. No evidence of obstructive uropathy on the left. A Solares catheter is present in the  lumen of the urinary bladder. Air in the lumen is consistent with instrumentation. Areas of nodular soft tissue in the anterior mesentery is consistent with carcinomatosis and there are mildly enlarged central mesenteric lymph nodes. No discrete adenopathy is otherwise evident. Anasarca with pleural effusions, subcutaneous edema and ascites.     Assessment: Asymptomatic mild to moderate right hydroureteronephrosis in the setting of metastatic pancreatic cancer, new since 2/23 imaging. Transition point proximal to the bladder but of unclear etiology due to imaging artifact in the area. Although evaluation for cause of obstruction limited, no obvious stone, surrounding mass, or bulky pelvic lymphadenopathy were seen. Creatinine stable since 10/4, only mildly elevated from baseline. Good urine output, urine culture without growth, mild leukocytosis stable. As patient is asymptomatic with only mild stable elevation in creatinine and no sign of  infection, may monitor hydronephrosis given prognosis and less need to conserve renal function as she is not currently on chemotherapy. Elevated PVRs in the 300, without sensation of incomplete emptying. Given ureteral transition point appears proximal to the bladder, less likely hydro is resulting from POMPA, thus may TOV with outpatient follow-up.     Recommendations:    - If patient to be discharged tomorrow, can trial of void prior to discharge. Obtain serial PVRs after solares removal, if < 300's with voids near 100%+ of PVR, may discharge without a solares  - Large stool burden on imaging, consider aggressive management of constipation, constipation may worsen urinary retention  - Can schedule outpatient urology follow-up with TERE in 4-6 weeks for PVR and creatinine recheck if desired. Call [phone] for scheduling  - Urology to sign off, please page for questions/concerns    Suzanne Tinoco PA-C  Pager [phone]

## 2024-10-08 NOTE — PROGRESS NOTES
Physical Therapy    Physical Therapy Treatment    Patient Name: Allyson Armendariz  MRN: [id number]  Department: [facility]  Room: 60/6011-A  Today's Date: 10/8/2024  Time Calculation  Start Time: 1419  Stop Time: 1450  Time Calculation (min): 31 min         Assessment/Plan   PT Assessment  PT Assessment Results: Decreased strength, Impaired balance, Decreased endurance, Decreased mobility, Pain  Rehab Prognosis: Good  Evaluation/Treatment Tolerance: Patient limited by pain  Medical Staff Made Aware: Yes  Strengths: Ability to acquire knowledge, Attitude of self, Support of Caregivers  Barriers to Participation: Comorbidities  End of Session Communication: Bedside nurse  Assessment Comment: Patient required assist for trans and limited amb distance this date. Per daughter patient will be returning home with assist from patients  and family. They are able to have a first floor set up. Will need home PT arranged and FWW ordered prior to d/c.  End of Session Patient Position: Up in chair, Alarm off, not on at start of session  PT Plan  Inpatient/Swing Bed or Outpatient: Inpatient  PT Plan  Treatment/Interventions: Bed mobility, Transfer training, Gait training, Stair training, Balance training, Neuromuscular re-education, Strengthening, Endurance training, Therapeutic exercise, Therapeutic activity, Home exercise program  PT Plan: Ongoing PT  PT Frequency: Daily  PT Discharge Recommendations: Low intensity level of continued care (pt would benefit from high intensity but is requesting low intensity PT)  Equipment Recommended upon Discharge: Wheeled walker  PT Recommended Transfer Status: Assistive device, Stand by assist  PT - OK to Discharge: Yes (eval complete and discharge recommendations made)      General Visit Information:   PT  Visit  PT Received On: 10/08/24  Response to Previous Treatment: Patient with no complaints from previous session.  General  Family/Caregiver Present: Yes  Caregiver Feedback: Daughter at  bedside and supportive  Prior to Session Communication: Bedside nurse  Patient Position Received: Up in chair, Alarm off, not on at start of session  Preferred Learning Style: verbal, visual  General Comment: Willing to participate in therapy session. Per daughter they have been working on her exercises.    Subjective   Precautions:  Precautions  LE Weight Bearing Status: Weight Bearing as Tolerated (L LE)  Medical Precautions: Fall precautions        Objective   Pain:  Pain Assessment  Pain Assessment: 0-10  0-10 (Numeric) Pain Score:  (states increased L LE pain during trans)  Cognition:  Cognition  Overall Cognitive Status: Within Functional Limits  Orientation Level: Oriented X4  Coordination:  Movements are Fluid and Coordinated: Yes  Postural Control:  Static Standing Balance  Static Standing-Balance Support: Bilateral upper extremity supported  Static Standing-Level of Assistance: Contact guard  Dynamic Standing Balance  Dynamic Standing-Balance Support: Bilateral upper extremity supported  Dynamic Standing-Level of Assistance: Contact guard  Dynamic Standing-Balance: Turning    Activity Tolerance:  Activity Tolerance  Endurance: Tolerates 10 - 20 min exercise with multiple rests  Treatments:  Therapeutic Exercise  Therapeutic Exercise Performed: Yes  Therapeutic Exercise Activity 1: Seated vinny LE: LAQ x 10  Therapeutic Exercise Activity 2: Standing L LE: hip flex x 6         Ambulation/Gait Training  Ambulation/Gait Training Performed: Yes  Ambulation/Gait Training 1  Surface 1: Level tile  Device 1: Rolling walker  Assistance 1: Contact guard  Quality of Gait 1: Forward flexed posture, Antalgic, Decreased step length  Comments/Distance (ft) 1: 12 ft x 2, 20 ft  Transfers  Transfer: Yes  Transfer 1  Transfer From 1: Sit to, Stand to  Transfer to 1: Stand, Sit  Technique 1: Sit to stand, Stand to sit  Transfer Device 1: Walker  Transfer Level of Assistance 1: Minimum assistance, Minimal verbal  cues  Trials/Comments 1: cues for sequencing and hand placement    Outcome Measures:  [facility] Basic Mobility  Turning from your back to your side while in a flat bed without using bedrails: A little  Moving from lying on your back to sitting on the side of a flat bed without using bedrails: A little  Moving to and from bed to chair (including a wheelchair): A little  Standing up from a chair using your arms (e.g. wheelchair or bedside chair): A little  To walk in hospital room: A little  Climbing 3-5 steps with railing: A lot  Basic Mobility - Total Score: 17    Education Documentation  Home Exercise Program, taught by Melissa Reed PTA at 10/8/2024  3:28 PM.  Learner: Family, Patient  Readiness: Acceptance  Method: Explanation  Response: Verbalizes Understanding  Comment: Reviewed HEP and equipment to ensure safe home going.    Mobility Training, taught by Melissa Reed PTA at 10/8/2024  3:28 PM.  Learner: Family, Patient  Readiness: Acceptance  Method: Explanation  Response: Verbalizes Understanding  Comment: Reviewed HEP and equipment to ensure safe home going.    Education Comments  No comments found.        OP EDUCATION:       Encounter Problems       Encounter Problems (Active)       Balance       Pt will maintain static/dynamic standing balance x4 minutes with RW and modified independence to demonstrate improved balance (Progressing)       Start:  10/03/24    Expected End:  10/17/24               Mobility       Pt will complete bed mobility independently with HOB flat and no use of bed rails (Progressing)       Start:  10/03/24    Expected End:  10/17/24            Pt will amb >150ft with RW and modified independence in prep for safe discharge home  (Progressing)       Start:  10/03/24    Expected End:  10/17/24            Pt will a/descend 12 steps with 1 rail and modified independence in prep for safe home entry/to access bathroom (Progressing)       Start:  10/03/24    Expected End:  10/17/24                PT Transfers       Pt will transfer sit to stand with RW and modified independence in prep for out of bed mobility  (Progressing)       Start:  10/03/24    Expected End:  10/17/24               Pain - Adult

## 2024-10-08 NOTE — CARE PLAN
The patient's goals for the shift include  pain mgt     The clinical goals for the shift include Pt will remain HDS and free of injury      Problem: Fall/Injury  Goal: Not fall by end of shift  Outcome: Progressing  Goal: Be free from injury by end of the shift  Outcome: Progressing  Goal: Verbalize understanding of personal risk factors for fall in the hospital  Outcome: Progressing  Goal: Verbalize understanding of risk factor reduction measures to prevent injury from fall in the home  Outcome: Progressing  Goal: Use assistive devices by end of the shift  Outcome: Progressing  Goal: Pace activities to prevent fatigue by end of the shift  Outcome: Progressing     Problem: Pain - Adult  Goal: Verbalizes/displays adequate comfort level or baseline comfort level  Outcome: Progressing     Problem: Safety - Adult  Goal: Free from fall injury  Outcome: Progressing     Problem: Discharge Planning  Goal: Discharge to home or other facility with appropriate resources  Outcome: Progressing

## 2024-10-08 NOTE — NURSING NOTE
Four eyes skin check completed by bedside RN and Fabiano Espinosa RN .     No new findings.  Updated pictures on sacrum in LDA.    Tashia Hylton RN

## 2024-10-08 NOTE — CARE PLAN
The clinical goals for the shift include Patient will remain free of injury through end of shift.    Problem: Fall/Injury  Goal: Not fall by end of shift  Outcome: Progressing  Goal: Be free from injury by end of the shift  Outcome: Progressing  Goal: Verbalize understanding of personal risk factors for fall in the hospital  Outcome: Progressing  Goal: Verbalize understanding of risk factor reduction measures to prevent injury from fall in the home  Outcome: Progressing  Goal: Use assistive devices by end of the shift  Outcome: Progressing     Problem: Pain - Adult  Goal: Verbalizes/displays adequate comfort level or baseline comfort level  Outcome: Progressing     Problem: Safety - Adult  Goal: Free from fall injury  Outcome: Progressing     Problem: Discharge Planning  Goal: Discharge to home or other facility with appropriate resources  Outcome: Progressing     Problem: Chronic Conditions and Co-morbidities  Goal: Patient's chronic conditions and co-morbidity symptoms are monitored and maintained or improved  Outcome: Progressing     Problem: Skin  Goal: Decreased wound size/increased tissue granulation at next dressing change  Outcome: Progressing  Goal: Participates in plan/prevention/treatment measures  Outcome: Progressing  Goal: Prevent/manage excess moisture  Outcome: Progressing  Goal: Prevent/minimize sheer/friction injuries  Outcome: Progressing  Goal: Promote skin healing  Outcome: Progressing

## 2024-10-08 NOTE — PROGRESS NOTES
Subjective   No acute events overnight. Pt denies any new symptoms. Denies chest pain, SOB or difficulty breathing.   Has not had a BM in the past 2 days    Objective     Vitals:    24 Hour Vitals  Temp:  [36 °C (96.8 °F)-36.2 °C (97.2 °F)] 36.1 °C (97 °F)  Heart Rate:  [60-72] 67  Resp:  [15-18] 16  BP: (143-187)/(70-80) 164/70    Temp (24hrs), Av.1 °C (96.9 °F), Min:36 °C (96.8 °F), Max:36.2 °C (97.2 °F)     24 hour Intake/Output    Intake/Output Summary (Last 24 hours) at 10/8/2024 0702  Last data filed at 10/8/2024 0200  Gross per 24 hour   Intake 780 ml   Output 2150 ml   Net -1370 ml        Physical exam:  General: well-appearing, no acute distress, resting comfortably in bed  HEENT: normocephalic, atraumatic  Cardio: regular rate and rhythm, normal S1 and S2, no murmurs  Pulm: some bilateral wheezing, breathing comfortably on room air  Abd: normal, active bowel sounds; soft, non-tender, non-distended  Extremities: no peripheral edema, scars, or lesions  Neuro: alert and oriented to person, place, and time, CN II-XII grossly intact  Psych: mood and affect are appropriate      Medications   Scheduled Medications  cyanocobalamin, 1,000 mcg, oral, Daily  DULoxetine, 30 mg, oral, Daily  ergocalciferol, 1,250 mcg, oral, Weekly  gabapentin, 300 mg, oral, TID  heparin (porcine), 5,000 Units, subcutaneous, q8h MURPHY  ipratropium-albuteroL, 3 mL, nebulization, TID  lipase-protease-amylase, 2 capsule, oral, TID  melatonin, 3 mg, oral, Nightly  metoprolol tartrate, 50 mg, oral, q6h  pantoprazole, 40 mg, oral, Daily before breakfast  polyethylene glycol, 17 g, oral, Daily  sennosides, 2 tablet, oral, Nightly     Continuous Medications    PRN Medications  PRN medications: acetaminophen, lidocaine-diphenhydraMINE-Maalox 1:1:1, oxyCODONE       Labs  CBC  Results from last 72 hours   Lab Units 10/07/24  0414 10/06/24  0430   WBC AUTO x10*3/uL 12.5* 12.2*   HEMOGLOBIN g/dL 10.0* 10.1*   HEMATOCRIT % 30.4* 31.0*   PLATELETS  AUTO x10*3/uL 256 233        BMP  Results from last 72 hours   Lab Units 10/07/24  0414 10/06/24  0430   SODIUM mmol/L 130* 132*   POTASSIUM mmol/L 5.0 4.9   CHLORIDE mmol/L 98 99   BUN mg/dL 39* 43*   CREATININE mg/dL 2.01* 2.04*   MAGNESIUM mg/dL 1.94 1.91   PHOSPHORUS mg/dL 3.8 3.2       Imaging:  === 09/29/24 ===    US RENAL COMPLETE    - Impression -  1. Moderate right-sided hydronephrosis without sonographic evidence  of nephrolithiasis.  2. Slightly lobulated contour of the left kidney, which may represent  persistent fetal lobulation, a normal anatomic variant.    I personally reviewed the images/study and I agree with the findings  as stated by Mckay Quick MD. This study was interpreted at  [facility], [place], [place]    MACRO:  None    Signed by: Arturo Arana 10/6/2024 8:32 AM  Dictation workstation:   [id number]   === 03/06/14 ===    CT ENTEROGRAPHY WITH CONTRAST          Assessment and plan:  Allyson Armendariz is a 72 yo F w PMH of metastatic pancreatic adenocarcinoma (with mets to liver) and Crohn's disease presenting as a transfer from [facility] for further evaluation with orthopedic surgery after being found to have L complete pathologic femur fracture. Patient found to have systolic murmur on cardiac exam and Echo showed LV outflow tract obstruction, cardiology consulted for recs. Cardiology cleared after repeat limited echo showed improvement in LV outflow tract obstruction following blood transfusion. Patient underwent L hemiarthroplasty on 10/2. Patient now having some urinary retention, worsening kidney function, and was noted to be hypotensive overnight though not complaining of lightheadedness or dizziness. JETHRO slightly improved today, possibly pre-renal vs ATN. Renal US showing hydronephrosis. Patient shortness of breath improved after duonebs and EKG showing some concern for atrial fibrillation with rates 90s-100s. Cardiology recommended metop tartrate  starting at 50 mg TID to q6 with goal HR 60s. Consulted urology for urinary retention and hydronephrosis now s/p solares placement and imaging remarkable for disease progression with mets     Updates 10/08  - Cr plateau at 2.01. Solares catheter in  - Increased metop tartrate 50 mg TID dosing to metoprolol tartrate 50 mg q6h yesterday so that she can be discharged on metoprolol succinate 200 mg daily, goal HR is 60s. Tolerating well  - CT A/P remarkable for findings consistent with metastatic pancreatic neoplasm to the liver and carcinomatosis. anasarca with pleural effusions, subcutaneous edema and ascites.  - Start Calcium/Vitamin D 500mg-400IU BID for 6 weeks prior to discharge  - Start DVT prophylaxis for 6 weeks on discharge     #Complete pathologic fracture of L femur  Patient had significant pain in L leg for past several months with negative imaging. Found to have pathologic fracture of L femur on CT scans at [facility], [place], operated by [facility] (scans can now be found in our system).   PLAN:  - s/p surgery with ortho 10/2  - s/p ancef 2 g q8h x 3 doses per ortho recs for prophylaxis  - will encourage incentive spirometry. duoneb q6h PRN for shortness of breath/wheezing  - wound care: mepilex for 7 days, after 7 days, will leave to open air  - will initiate subcutaneous heparin (d/t decreased GFR) starting tonight. On subcutaneous heparin (d/t decreased GFR) starting today given concern for bleeding yesterday  - patient will require 6 weeks of DVT prophylaxis per orthopedic surgery  - PT/OT recommending home w home health     Ortho recs: pt should be weightbearing as tolerated until first follow up appointment. Patient will require 6 weeks total of DVT prophylaxis from an orthopedic standpoint, if ok per primary team. Patient currently has mepilex x1 on surgical site. Dressing should be removed POD7. Please send home with Calcium/Vitamin D 500mg-400IU BID for 6 weeks. Patient should follow up w/ Dr. Alcala 2 weeks after surgery for  post-operative appointment (patient may call [phone] to schedule). Please page with questions.      #Holosystolic Murmur  #Atrial Fibrillation  Patient found to have holosystolic murmur on exam. Patient does not have known history of valvular dysfunction. Echo showed LV outflow obstruction that improved on repeat echo following transfusion. Cardiology consulted and recommended metoprolol tartrate 50 TID. On 10/5, converted to metoprolol succinate 50 daily. EKG 10/6 AM showing possible atrial fibrillation with rates 90s-100s.  PLAN:  - Increased metop tartrate 50 mg TID dosing to metoprolol tartrate 50 mg q6h so that she can be discharged on metoprolol succinate 200 mg daily, goal HR is 60s     #Metastatic Pancreatic Adenocarcinoma  Patient has metastatic pancreatic adenocarcinoma w mets to the liver. Follows with Dr. Hightower at [facility]. S/p gem/abraxane, which was stopped August 2024. Plan was to biopsy liver lesions with EUS to guide further treatment, but patient's labs are not concerning for obstruction.  PLAN:  - continue vitamin B and D and creon 38372 TID  - pain regimen: duloxetine 30 mg daily, gabapentin 300 mg TID, acetaminophen 975 mg q6h PRN mild pain, oxycodone 10 mg q4h PRN severe pain  - will consider supportive oncology consult if patient has worsening pain     This patient has a central line              Reason for the central line remaining today? Parenteral medication     F: PRN  E: K > 4, Mg > 2  N: regular diet  A: mediport  GI: pantoprazole, miralax, senna  DVT: subcutaneous heparin  Abx: ancef (10/2-10/3)  Dispo: home with home health  Pain: duloxetine 30 mg daily, gabapentin 300 mg TID, acetaminophen 975 mg q6h PRN mild pain, oxycodone 10 mg q4h PRN severe pain     Code: DNR/DNI (confirmed on admission)  NOK: Oriana (, [phone]), Shameka (daughter, [phone])    Patient seen and discussed with attending physician.    Anjali Umaña MD  PGY-1 Internal Medicine

## 2024-10-08 NOTE — PROGRESS NOTES
Allyson Armendariz is a 74 yo F w PMH of metastatic pancreatic adenocarcinoma (with mets to liver) and Crohn's disease presenting as a transfer from [facility], [place], operated by [facility] for further evaluation with orthopedic surgery after being found to have L complete pathologic femur fracture. L hemiarthroplasty on 10/2.  Patient now having some urinary retention, worsening kidney function.    Plan per Medical Surgical Team: Patient having urinary retention episodes, and JETHRO pending urology recommendations. Monitoring creatinine.     ADOD when medically stable. [facility] PT/OT.   Care transitions will continue to follow for additional homegoing needs. Suzanne Banda RN, TCC

## 2024-10-09 VITALS
TEMPERATURE: 97.7 F | BODY MASS INDEX: 25.16 KG/M2 | RESPIRATION RATE: 18 BRPM | DIASTOLIC BLOOD PRESSURE: 68 MMHG | SYSTOLIC BLOOD PRESSURE: 149 MMHG | HEART RATE: 54 BPM | OXYGEN SATURATION: 96 % | HEIGHT: 63 IN | WEIGHT: 141.98 LBS

## 2024-10-09 LAB
ALBUMIN SERPL BCP-MCNC: 2.3 G/DL (ref 3.4–5)
ANION GAP SERPL CALC-SCNC: 13 MMOL/L (ref 10–20)
BASOPHILS # BLD AUTO: 0.04 X10*3/UL (ref 0–0.1)
BASOPHILS NFR BLD AUTO: 0.3 %
BUN SERPL-MCNC: 32 MG/DL (ref 6–23)
CALCIUM SERPL-MCNC: 8.3 MG/DL (ref 8.6–10.6)
CHLORIDE SERPL-SCNC: 97 MMOL/L (ref 98–107)
CO2 SERPL-SCNC: 25 MMOL/L (ref 21–32)
CREAT SERPL-MCNC: 1.98 MG/DL (ref 0.5–1.05)
EGFRCR SERPLBLD CKD-EPI 2021: 26 ML/MIN/1.73M*2
EOSINOPHIL # BLD AUTO: 0.11 X10*3/UL (ref 0–0.4)
EOSINOPHIL NFR BLD AUTO: 0.9 %
ERYTHROCYTE [DISTWIDTH] IN BLOOD BY AUTOMATED COUNT: 14.1 % (ref 11.5–14.5)
GLUCOSE SERPL-MCNC: 119 MG/DL (ref 74–99)
HCT VFR BLD AUTO: 30.7 % (ref 36–46)
HGB BLD-MCNC: 10.1 G/DL (ref 12–16)
IMM GRANULOCYTES # BLD AUTO: 0.14 X10*3/UL (ref 0–0.5)
IMM GRANULOCYTES NFR BLD AUTO: 1.1 % (ref 0–0.9)
LYMPHOCYTES # BLD AUTO: 1.07 X10*3/UL (ref 0.8–3)
LYMPHOCYTES NFR BLD AUTO: 8.8 %
MAGNESIUM SERPL-MCNC: 2.05 MG/DL (ref 1.6–2.4)
MCH RBC QN AUTO: 31.1 PG (ref 26–34)
MCHC RBC AUTO-ENTMCNC: 32.9 G/DL (ref 32–36)
MCV RBC AUTO: 95 FL (ref 80–100)
MONOCYTES # BLD AUTO: 1.3 X10*3/UL (ref 0.05–0.8)
MONOCYTES NFR BLD AUTO: 10.6 %
NEUTROPHILS # BLD AUTO: 9.56 X10*3/UL (ref 1.6–5.5)
NEUTROPHILS NFR BLD AUTO: 78.3 %
NRBC BLD-RTO: 0 /100 WBCS (ref 0–0)
PHOSPHATE SERPL-MCNC: 4.1 MG/DL (ref 2.5–4.9)
PLATELET # BLD AUTO: 261 X10*3/UL (ref 150–450)
POTASSIUM SERPL-SCNC: 4.8 MMOL/L (ref 3.5–5.3)
RBC # BLD AUTO: 3.25 X10*6/UL (ref 4–5.2)
SODIUM SERPL-SCNC: 130 MMOL/L (ref 136–145)
WBC # BLD AUTO: 12.2 X10*3/UL (ref 4.4–11.3)

## 2024-10-09 PROCEDURE — 2500000001 HC RX 250 WO HCPCS SELF ADMINISTERED DRUGS (ALT 637 FOR MEDICARE OP)

## 2024-10-09 PROCEDURE — 83735 ASSAY OF MAGNESIUM: CPT

## 2024-10-09 PROCEDURE — 2500000004 HC RX 250 GENERAL PHARMACY W/ HCPCS (ALT 636 FOR OP/ED)

## 2024-10-09 PROCEDURE — 97116 GAIT TRAINING THERAPY: CPT | Mod: GP,CQ

## 2024-10-09 PROCEDURE — 80069 RENAL FUNCTION PANEL: CPT

## 2024-10-09 PROCEDURE — 99239 HOSP IP/OBS DSCHRG MGMT >30: CPT

## 2024-10-09 PROCEDURE — 97110 THERAPEUTIC EXERCISES: CPT | Mod: GP,CQ

## 2024-10-09 PROCEDURE — 85025 COMPLETE CBC W/AUTO DIFF WBC: CPT

## 2024-10-09 RX ORDER — FERROUS SULFATE, DRIED 160(50) MG
1 TABLET, EXTENDED RELEASE ORAL 2 TIMES DAILY
Qty: 60 TABLET | Refills: 1 | Status: SHIPPED | OUTPATIENT
Start: 2024-10-09 | End: 2024-11-20

## 2024-10-09 RX ORDER — TALC
3 POWDER (GRAM) TOPICAL NIGHTLY
Qty: 30 TABLET | Refills: 0 | Status: SHIPPED | OUTPATIENT
Start: 2024-10-09 | End: 2024-11-08

## 2024-10-09 RX ORDER — HEPARIN 100 UNIT/ML
5 SYRINGE INTRAVENOUS AS NEEDED
Status: DISCONTINUED | OUTPATIENT
Start: 2024-10-09 | End: 2024-10-09 | Stop reason: HOSPADM

## 2024-10-09 RX ORDER — METOPROLOL SUCCINATE 200 MG/1
200 TABLET, EXTENDED RELEASE ORAL DAILY
Qty: 30 TABLET | Refills: 0 | Status: SHIPPED | OUTPATIENT
Start: 2024-10-09 | End: 2024-11-08

## 2024-10-09 RX ORDER — POLYETHYLENE GLYCOL 3350 17 G/17G
17 POWDER, FOR SOLUTION ORAL 2 TIMES DAILY
Qty: 60 PACKET | Refills: 0 | Status: SHIPPED | OUTPATIENT
Start: 2024-10-09 | End: 2024-10-09

## 2024-10-09 RX ORDER — POLYETHYLENE GLYCOL 3350 17 G/17G
17 POWDER, FOR SOLUTION ORAL AS NEEDED
Qty: 30 PACKET | Refills: 0 | Status: SHIPPED | OUTPATIENT
Start: 2024-10-09 | End: 2024-11-08

## 2024-10-09 RX ORDER — SENNOSIDES 8.6 MG/1
2 TABLET ORAL 2 TIMES DAILY
Qty: 120 TABLET | Refills: 0 | Status: SHIPPED | OUTPATIENT
Start: 2024-10-09 | End: 2024-10-09

## 2024-10-09 RX ORDER — SENNOSIDES 8.6 MG/1
2 TABLET ORAL AS NEEDED
Qty: 30 TABLET | Refills: 0 | Status: SHIPPED | OUTPATIENT
Start: 2024-10-09 | End: 2024-11-08

## 2024-10-09 RX ADMIN — OXYCODONE HYDROCHLORIDE 10 MG: 5 TABLET ORAL at 11:02

## 2024-10-09 RX ADMIN — GABAPENTIN 300 MG: 300 CAPSULE ORAL at 09:39

## 2024-10-09 RX ADMIN — HEPARIN 500 UNITS: 100 SYRINGE at 17:40

## 2024-10-09 RX ADMIN — CYANOCOBALAMIN TAB 1000 MCG 1000 MCG: 1000 TAB at 09:39

## 2024-10-09 RX ADMIN — HEPARIN SODIUM 5000 UNITS: 5000 INJECTION, SOLUTION INTRAVENOUS; SUBCUTANEOUS at 05:16

## 2024-10-09 RX ADMIN — METOPROLOL TARTRATE 50 MG: 50 TABLET, FILM COATED ORAL at 06:24

## 2024-10-09 RX ADMIN — HEPARIN SODIUM 5000 UNITS: 5000 INJECTION, SOLUTION INTRAVENOUS; SUBCUTANEOUS at 15:06

## 2024-10-09 RX ADMIN — METOPROLOL TARTRATE 50 MG: 50 TABLET, FILM COATED ORAL at 00:55

## 2024-10-09 RX ADMIN — PANCRELIPASE 2 CAPSULE: 120000; 24000; 76000 CAPSULE, DELAYED RELEASE PELLETS ORAL at 12:08

## 2024-10-09 RX ADMIN — DULOXETINE HYDROCHLORIDE 30 MG: 30 CAPSULE, DELAYED RELEASE ORAL at 09:38

## 2024-10-09 RX ADMIN — GABAPENTIN 300 MG: 300 CAPSULE ORAL at 15:06

## 2024-10-09 RX ADMIN — PANCRELIPASE 2 CAPSULE: 120000; 24000; 76000 CAPSULE, DELAYED RELEASE PELLETS ORAL at 09:38

## 2024-10-09 RX ADMIN — OXYCODONE HYDROCHLORIDE 10 MG: 5 TABLET ORAL at 00:54

## 2024-10-09 RX ADMIN — SENNOSIDES 17.2 MG: 8.6 TABLET, FILM COATED ORAL at 09:39

## 2024-10-09 RX ADMIN — POLYETHYLENE GLYCOL 3350 17 G: 17 POWDER, FOR SOLUTION ORAL at 09:39

## 2024-10-09 RX ADMIN — PANTOPRAZOLE SODIUM 40 MG: 40 TABLET, DELAYED RELEASE ORAL at 06:24

## 2024-10-09 ASSESSMENT — COGNITIVE AND FUNCTIONAL STATUS - GENERAL
PERSONAL GROOMING: A LITTLE
MOVING TO AND FROM BED TO CHAIR: A LOT
CLIMB 3 TO 5 STEPS WITH RAILING: A LOT
DRESSING REGULAR LOWER BODY CLOTHING: A LITTLE
WALKING IN HOSPITAL ROOM: A LOT
MOBILITY SCORE: 15
TURNING FROM BACK TO SIDE WHILE IN FLAT BAD: A LITTLE
TURNING FROM BACK TO SIDE WHILE IN FLAT BAD: A LITTLE
TOILETING: A LITTLE
MOVING TO AND FROM BED TO CHAIR: A LITTLE
DRESSING REGULAR UPPER BODY CLOTHING: A LITTLE
STANDING UP FROM CHAIR USING ARMS: A LITTLE
MOVING FROM LYING ON BACK TO SITTING ON SIDE OF FLAT BED WITH BEDRAILS: A LITTLE
MOBILITY SCORE: 17
CLIMB 3 TO 5 STEPS WITH RAILING: A LOT
WALKING IN HOSPITAL ROOM: A LITTLE
DAILY ACTIVITIY SCORE: 19
HELP NEEDED FOR BATHING: A LITTLE
STANDING UP FROM CHAIR USING ARMS: A LOT

## 2024-10-09 ASSESSMENT — PAIN - FUNCTIONAL ASSESSMENT
PAIN_FUNCTIONAL_ASSESSMENT: 0-10

## 2024-10-09 ASSESSMENT — PAIN SCALES - GENERAL
PAINLEVEL_OUTOF10: 7
PAINLEVEL_OUTOF10: 8
PAINLEVEL_OUTOF10: 4
PAINLEVEL_OUTOF10: 7
PAINLEVEL_OUTOF10: 4

## 2024-10-09 ASSESSMENT — PAIN DESCRIPTION - LOCATION: LOCATION: LEG

## 2024-10-09 NOTE — CARE PLAN
The patient's goals for the shift include  remain safe, have BM    The clinical goals for the shift include Pt will remain HDS and free of injury      Problem: Fall/Injury  Goal: Use assistive devices by end of the shift  Outcome: Progressing  Goal: Pace activities to prevent fatigue by end of the shift  Outcome: Progressing     Problem: Discharge Planning  Goal: Discharge to home or other facility with appropriate resources  Outcome: Progressing     Problem: Chronic Conditions and Co-morbidities  Goal: Patient's chronic conditions and co-morbidity symptoms are monitored and maintained or improved  Outcome: Progressing     Problem: Skin  Goal: Decreased wound size/increased tissue granulation at next dressing change  Outcome: Progressing  Flowsheets (Taken 10/9/2024 0954)  Decreased wound size/increased tissue granulation at next dressing change: Protective dressings over bony prominences  Goal: Promote/optimize nutrition  Outcome: Progressing  Flowsheets (Taken 10/9/2024 0954)  Promote/optimize nutrition:   Monitor/record intake including meals   Consume > 50% meals/supplements

## 2024-10-09 NOTE — CARE PLAN
The patient's goals for the shift include      The clinical goals for the shift include Pt will continue to remain free of injury    Over the shift, the patient did make progress toward the following goals. Barriers to progression include none. Recommendations to address these barriers include na.

## 2024-10-09 NOTE — CARE PLAN
Allyson Armendariz discharged at 1815  and 10/09/24   Patient discharged home via private car .  Discharge education and teaching completed with Allyson Armendariz and Shameka, daughter.  RN signature: Tashia Hylton RN       Problem: Fall/Injury  Goal: Use assistive devices by end of the shift  10/9/2024 1802 by Tashia Hylton RN  Outcome: Met  10/9/2024 0954 by Tashia Hylton RN  Outcome: Progressing  Goal: Pace activities to prevent fatigue by end of the shift  10/9/2024 1802 by Tashia Hylton RN  Outcome: Met  10/9/2024 0954 by Tashia Hylton RN  Outcome: Progressing     Problem: Discharge Planning  Goal: Discharge to home or other facility with appropriate resources  10/9/2024 1802 by Tashia Hylton RN  Outcome: Met  10/9/2024 0954 by Tashia Hylton RN  Outcome: Progressing     Problem: Chronic Conditions and Co-morbidities  Goal: Patient's chronic conditions and co-morbidity symptoms are monitored and maintained or improved  10/9/2024 1802 by Tashia Hylton RN  Outcome: Met  10/9/2024 0954 by Tashia Hylton RN  Outcome: Progressing     Problem: Skin  Goal: Decreased wound size/increased tissue granulation at next dressing change  10/9/2024 1802 by Tashia Hylton RN  Outcome: Met  10/9/2024 0954 by Tashia Hylton RN  Outcome: Progressing  Flowsheets (Taken 10/9/2024 0954)  Decreased wound size/increased tissue granulation at next dressing change: Protective dressings over bony prominences  Goal: Promote/optimize nutrition  10/9/2024 1802 by Tashia Hylton RN  Outcome: Met  10/9/2024 0954 by Tashia Hytlon RN  Outcome: Progressing  Flowsheets (Taken 10/9/2024 0954)  Promote/optimize nutrition:   Monitor/record intake including meals   Consume > 50% meals/supplements

## 2024-10-09 NOTE — PROGRESS NOTES
Physical Therapy    Physical Therapy Treatment    Patient Name: Allyson Armendariz  MRN: [id number]  Department: [facility]  Room: 60/6011-A  Today's Date: 10/9/2024  Time Calculation  Start Time: 1243  Stop Time: 1307  Time Calculation (min): 24 min         Assessment/Plan   PT Assessment  PT Assessment Results: Decreased strength, Decreased range of motion, Decreased endurance, Impaired balance  Rehab Prognosis: Good  Evaluation/Treatment Tolerance: Patient tolerated treatment well  Medical Staff Made Aware: Yes  Strengths: Attitude of self, Support of Caregivers  Barriers to Participation: Comorbidities  End of Session Communication: Bedside nurse  Assessment Comment: Patient tolerated increased amb distance this date and decreased assist for trans. Will continue to follow and progress while in house.  End of Session Patient Position: Up in chair, Alarm off, not on at start of session  PT Plan  Inpatient/Swing Bed or Outpatient: Inpatient  PT Plan  Treatment/Interventions: Bed mobility, Transfer training, Gait training, Stair training, Balance training, Neuromuscular re-education, Strengthening, Endurance training, Therapeutic exercise, Therapeutic activity, Home exercise program  PT Plan: Ongoing PT  PT Frequency: Daily  PT Discharge Recommendations: Low intensity level of continued care (pt would benefit from high intensity but is requesting low intensity PT)  Equipment Recommended upon Discharge: Wheeled walker  PT Recommended Transfer Status: Assistive device, Stand by assist  PT - OK to Discharge: Yes (eval complete and discharge recommendations made)      General Visit Information:   PT  Visit  PT Received On: 10/09/24  Response to Previous Treatment: Patient with no complaints from previous session.  General  Family/Caregiver Present: Yes  Caregiver Feedback: Daughter at bedside and supportive  Prior to Session Communication: Bedside nurse  Patient Position Received: Bed, 3 rail up, Alarm off, not on at start of  session  Preferred Learning Style: visual, verbal  General Comment: Patient supine in bed sleeping upon therapy arrival. Easily awoken and agreeable to participate in tx session.    Subjective   Precautions:  Precautions  LE Weight Bearing Status: Weight Bearing as Tolerated (L LE)  Medical Precautions: Fall precautions      Objective   Pain:  Pain Assessment  Pain Assessment: 0-10  0-10 (Numeric) Pain Score: 4  Pain Type: Acute pain  Pain Location: Leg  Pain Orientation: Left, Lower  Cognition:  Cognition  Overall Cognitive Status: Within Functional Limits  Orientation Level: Disoriented to place  Coordination:  Movements are Fluid and Coordinated: Yes  Postural Control:  Static Standing Balance  Static Standing-Balance Support: Bilateral upper extremity supported  Static Standing-Level of Assistance: Contact guard  Dynamic Standing Balance  Dynamic Standing-Balance Support: Bilateral upper extremity supported  Dynamic Standing-Level of Assistance: Contact guard  Dynamic Standing-Balance: Turning    Activity Tolerance:  Activity Tolerance  Endurance: Tolerates 10 - 20 min exercise with multiple rests  Treatments:  Therapeutic Exercise  Therapeutic Exercise Performed: Yes  Therapeutic Exercise Activity 1: Supine vinny LE: AP x 12, glute set x 10, QS x 10  Therapeutic Exercise Activity 2: Supine L LE: HS x 10, SAQ x 10, hip abd x 10         Bed Mobility  Bed Mobility: Yes  Bed Mobility 1  Bed Mobility 1: Supine to sitting  Level of Assistance 1: Contact guard  Bed Mobility Comments 1: required increased time  Bed Mobility 2  Bed Mobility  2: Scooting (towards EOB)  Level of Assistance 2: Contact guard  Bed Mobility Comments 2: required increased time    Ambulation/Gait Training  Ambulation/Gait Training Performed: Yes  Ambulation/Gait Training 1  Surface 1: Level tile  Device 1: Rolling walker  Assistance 1: Contact guard  Quality of Gait 1: Decreased step length, Forward flexed posture  Comments/Distance (ft) 1: 50 ft  (increased cues to keep fww close and vinny LEs within)  Transfers  Transfer: Yes  Transfer 1  Transfer From 1: Sit to, Stand to  Transfer to 1: Stand, Sit  Technique 1: Sit to stand, Stand to sit  Transfer Device 1: Walker  Transfer Level of Assistance 1: Contact guard, Minimal verbal cues  Trials/Comments 1: cues for vinny UE placement    Outcome Measures:  [facility] Basic Mobility  Turning from your back to your side while in a flat bed without using bedrails: A little  Moving from lying on your back to sitting on the side of a flat bed without using bedrails: A little  Moving to and from bed to chair (including a wheelchair): A little  Standing up from a chair using your arms (e.g. wheelchair or bedside chair): A little  To walk in hospital room: A little  Climbing 3-5 steps with railing: A lot  Basic Mobility - Total Score: 17    Education Documentation  Home Exercise Program, taught by Melissa Reed PTA at 10/9/2024  2:06 PM.  Learner: Family, Patient  Readiness: Acceptance  Method: Explanation  Response: Verbalizes Understanding  Comment: Reviewed supine ther ex and gait training with fww    Mobility Training, taught by Melissa Reed PTA at 10/9/2024  2:06 PM.  Learner: Family, Patient  Readiness: Acceptance  Method: Explanation  Response: Verbalizes Understanding  Comment: Reviewed supine ther ex and gait training with fww    Education Comments  No comments found.        OP EDUCATION:       Encounter Problems       Encounter Problems (Active)       Balance       Pt will maintain static/dynamic standing balance x4 minutes with RW and modified independence to demonstrate improved balance (Progressing)       Start:  10/03/24    Expected End:  10/17/24               Mobility       Pt will complete bed mobility independently with HOB flat and no use of bed rails (Progressing)       Start:  10/03/24    Expected End:  10/17/24            Pt will amb >150ft with RW and modified independence in prep for safe discharge  home  (Progressing)       Start:  10/03/24    Expected End:  10/17/24            Pt will a/descend 12 steps with 1 rail and modified independence in prep for safe home entry/to access bathroom (Progressing)       Start:  10/03/24    Expected End:  10/17/24               PT Transfers       Pt will transfer sit to stand with RW and modified independence in prep for out of bed mobility  (Progressing)       Start:  10/03/24    Expected End:  10/17/24               Pain - Adult

## 2024-10-10 LAB
ATRIAL RATE: 104 BPM
Q ONSET: 219 MS
QRS COUNT: 17 BEATS
QRS DURATION: 74 MS
QT INTERVAL: 336 MS
QTC CALCULATION(BAZETT): 435 MS
QTC FREDERICIA: 399 MS
R AXIS: 43 DEGREES
T AXIS: 68 DEGREES
T OFFSET: 387 MS
VENTRICULAR RATE: 101 BPM

## 2024-10-10 NOTE — DISCHARGE SUMMARY
Discharge Diagnosis  Pancreatic cancer (Multi)    Issues Requiring Follow-Up  Pancreatic cancer with mets  Pathologic fracture of L femur s/p Hemiarthroplasty    Test Results Pending At Discharge  Pending Labs       Order Current Status    Surgical Pathology Exam In process            Hospital Course  Allyson Armendariz is a 74 yo F w PMH of pancreatic adenocarcinoma (w mets to the liver) and Crohn's Disease transferred from [facility] for further evaluation with orthopedic surgery for a L pathologic femur fracture. She was found to have a holosystolic murmur and echo showed a LV outflow tract obtsruction, so she was seen by cardiology for cardiac risk assessment and surgery clearance. They recommended fluid resuscitation as well as metoprolol 50 tartrate TID followed by repeat echo, which showed improvement in LV outflow tract obstruction. She underwent hemiarthroplasty with orthopedic surgery on 10/2/2024. On 10/3/2024, she had hypotension that responded to fluids and urinary retention, requiring straight catheterization. On 10/4/2024, she began having worsening kidney function and urinary retention. JETHRO thought to be pre-renal given improvement with fluids vs ATN d/t hypotension on 10/3. Renal US showed some hydronephrosis. On 10/6, she had some increased shortness of breath that improved with duoneb treatment. Pt then had some urinary retention with PVR >350, solares in. Cr plateau at 2.01. Consulted Urology, recommended foleys to decompress bladder. CT A/P remarkable for findings consistent with metastatic pancreatic neoplasm to the liver and carcinomatosis. anasarca with pleural effusions, subcutaneous edema and ascites. Pt's urinary retention resolved on 10//9, confirmed after a TOV with  ml. Her cr also downtredning. Metop tartrate 50 mg q6 switched to metop succ 200 mg daily on discharge.    Todo:  [ ] follow-up with orthopedic surgery (SHEREEN Castro) on 10/22/2024  [ ] follow up appointment with Dr. Hightower  on 10/18/24      Pertinent Physical Exam At Time of Discharge  Physical Exam  General: well-appearing, no acute distress, resting comfortably in bed  HEENT: normocephalic, atraumatic  Cardio: regular rate and rhythm, normal S1 and S2, no murmurs  Pulm: some bilateral wheezing, breathing comfortably on room air  Abd: normal, active bowel sounds; soft, non-tender, non-distended  Extremities: no peripheral edema, scars, or lesions  Neuro: alert and oriented to person, place, and time, CN II-XII grossly intact  Psych: mood and affect are appropriate    Home Medications     Medication List      START taking these medications     apixaban 2.5 mg tablet; Commonly known as: Eliquis; Take 1 tablet (2.5   mg) by mouth 2 times a day.   calcium carbonate-vitamin D3 500 mg-5 mcg (200 unit) tablet; Take 1   tablet by mouth 2 times a day.   melatonin 3 mg tablet; Take 1 tablet (3 mg) by mouth once daily at   bedtime.   metoprolol succinate  mg 24 hr tablet; Commonly known as:   Toprol-XL; Take 1 tablet (200 mg) by mouth once daily. Do not crush or   chew.   polyethylene glycol 17 gram packet; Commonly known as: Glycolax,   Miralax; Take 17 g by mouth if needed (constipation).   sennosides 8.6 mg tablet; Commonly known as: Senokot; Take 2 tablets   (17.2 mg) by mouth if needed for constipation.     CONTINUE taking these medications     Creon 12,000-38,000 -60,000 unit capsule; Generic drug: pancrelipase   (Lip-Prot-Amyl)   cyanocobalamin 1,000 mcg tablet; Commonly known as: Vitamin B-12   DULoxetine 30 mg DR capsule; Commonly known as: Cymbalta   ergocalciferol 1.25 MG (84668 UT) capsule; Commonly known as: Vitamin   D-2   gabapentin 300 mg capsule; Commonly known as: Neurontin   omeprazole 40 mg DR capsule; Commonly known as: PriLOSEC   oxyCODONE 10 mg immediate release tablet; Commonly known as: Roxicodone     STOP taking these medications     dexAMETHasone 4 mg tablet; Commonly known as: Decadron       Outpatient  Follow-Up  Future Appointments   Date Time Provider Department Center   10/14/2024 11:15 AM Spenser Cavanaugh MD [id number] [place]   10/22/2024 10:00 AM Bronwyn Wan PA-C [id number] [facility]   11/5/2024 11:30 AM Yao Alejandro MD [id number] [facility]       Anjali Umaña MD

## 2024-10-11 NOTE — OP NOTE
Hip Hemiarthroplasty (L) Operative Note     Date: 10/2/2024  OR Location: 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  There no available residents to assist with for this procedure.  Her assistance was needed and critical to the success of this procedure.  She assisted with surgical exposure, placement of retractors and implantation of implant/prosthesis and wound closure      Procedure Summary  Anesthesia: General  ASA: III  Anesthesia Staff: Anesthesiologist: Allison Jackson MD; Joyce Saldivar MD; Marleny Schilling MD  C-AA: PATRICIA Goel  Estimated Blood Loss: 100 mL  Intra-op Medications:   Administrations occurring from 1030 to 1305 on 10/02/24:   Medication Name Total Dose   acetaminophen (Tylenol) tablet 975 mg Cannot be calculated   cyanocobalamin (Vitamin B-12) tablet 1,000 mcg Cannot be calculated   DULoxetine (Cymbalta) DR capsule 30 mg Cannot be calculated   ergocalciferol (Vitamin D-2) capsule 1,250 mcg Cannot be calculated   gabapentin (Neurontin) capsule 300 mg Cannot be calculated   lipase-protease-amylase (Creon) 24,000-76,000 -120,000 unit per capsule 2 capsule Cannot be calculated   metoprolol tartrate (Lopressor) tablet 50 mg Cannot be calculated   oxyCODONE (Roxicodone) immediate release tablet 10 mg Cannot be calculated   pantoprazole (ProtoNix) EC tablet 40 mg Cannot be calculated              Anesthesia  Record               Intraprocedure I/O Totals          Intake    Tranexamic Acid 0.00 mL    The total shown is the total volume documented since Anesthesia Start was filed.    Total Intake 0 mL          Specimen:   ID Type Source Tests Collected by Time   1 : Left Femoral Head and Hip Joint Tissue FEMORAL HEAD LEFT SURGICAL PATHOLOGY EXAM Jimmy Alcala MD 10/2/2024 1345        Staff:   Circulator: Chaya Ramosub Person: Beau Ramosub Person: Clarissa Robins Circulator: Blanca Robins Scrub: Chaya Robins Scrub: Niurka         Drains and/or Catheters: * None in log *    Tourniquet Times:         Implants:  Implants       Type Name Action Serial No.      Joint CEMENT, BONE SIMPLEX P W/TOBRA - [id number] Implanted      Joint CEMENT, BONE SIMPLEX P W/TOBRA - [id number] Implanted      Joint CEMENT, BONE SIMPLEX P W/TOBRA - [id number] Implanted      Joint Hip HIP STEM, SUMMIT CEMENT 2 STD - [id number] Implanted      Joint Hip CEMENTRALIZER, FEMORAL 8.5MM - [id number] Implanted      Joint Hip HIP BALL, MODULAR CHRISTOPHER, 44MM OD - [id number] Implanted      Joint Hip IMPLANT, ARTICUL/MEJIA, TAPERED SPACER, +5 - [id number] Implanted               Findings: See below    Indications: Allyson Armendariz is an 73 y.o. female who is having surgery for Left displaced femoral neck fracture [S72.002A].  History of metastatic pancreatic cancer.  Patient was noted to have left proximal femur metastatic disease.  Patient subsequently developed pathologic fracture of the femoral neck.  Patient presented due to increasing pain and inability to walk.  Recommended resection of the tumor and hemiarthroplasty.    The patient was seen in the preoperative area. The risks, benefits, complications, treatment options, non-operative alternatives, expected recovery and outcomes were discussed with the patient. The possibilities of reaction to medication, pulmonary aspiration, injury to surrounding structures, bleeding, recurrent infection, the  need for additional procedures, failure to diagnose a condition, and creating a complication requiring transfusion or operation were discussed with the patient. The patient concurred with the proposed plan, giving informed consent.  The site of surgery was properly noted/marked if necessary per policy. The patient has been actively warmed in preoperative area. Preoperative antibiotics have been ordered and given within 1 hours of incision. Venous thrombosis prophylaxis have been ordered including bilateral sequential compression devices and chemical prophylaxis    Procedure Details:        We proceeded to the operating room.  Appropriate time-out was  performed.  Anesthesia was initiated by the Anesthesia team.   After appropriate time-out was performed, the [place] table boot was  placed on bilateral feet.  We ensured that the boot was not too  tight.  Patient was transferred to the [place] table, placed in a supine  position where all bony prominences were adequately padded. Patient  received preoperative antibiotics and tranexamic acid to minimize  risk of bleeding.  Patient was prepped and draped in the usual sterile  fashion.    We made a direct anterior approach to the hip, centered over the  tensor fascia grady muscle belly with slight proximal extension to allow for better access to the femoral component.  Incision was made using the knife.   The fascia of the tensor muscle was incised.  We stayed within the  tensor compartment.  The lateral circumflex vessel was identified and  ligated.  We then elevated the rectus off the anterior hip capsule.   The anterior hip capsule was identified.  Inverted T-capsulotomy was  performed being certain to avoid injury to the anterior labrum.  The capsule was peeled off of the distal femoral neck and proximal acetabulum labrum.    We placed a retractor around the neck.  Using the  anterior tubercle of the intertrochanteric line as the landmark, we  Revised the femoral neck cut.   The remnant of the femoral neck and the head were removed.  The acetabulum was inspected and noted to have minimal arthritis.  The labrum was intact.  Any soft tissue or bony debris within the acetabulum was removed to allow for a good head Fit.  We trialed the head sizes using the head sizing guide and a 44 size head was selected.    Next, we turned our  attention to preparation of the femur for the stem.  We externally  rotated the femur.  We made a capsular release both medial and  laterally.  We extended the leg.  The femur destiny within the wound.  We noted that there was metastatic bone disease all along the calcar.  I used a combination of a rongeur and osteotome to remove any residual tumor.  The head and neck that contained the tumor was also removed and sent to pathology.  We also used a curette to remove additional tumor from the trochanteric greater or lesser.  We copiously irrigated and was satisfied that the tumor was completely resected.  We then used calcar orientation to  the version of the stem.  A  box osteotome was used to lateralize the entry point.  The canal  finder was inserted.  We began broaching.  We started with a starter  broach and broached up to size to.  There was excellent fit with the  broach.  There was no further subsiding of the broach.  The broach  was rotational and was very stable.  The broach was removed and We then copiously irrigated.  The canal was completely dry to prepare for cementing.  On the back table mixed the cement.  At this time we injected the cement into the canal after we placed the canal restrictor.  The size 2 stem was then inserted and held in place using the calcar to orient the stem.  Once the cement hardened and the implant was completely stable we elected to trial 1.5 head.  We  proceeded to reduce the hip, took it through extreme range of motion.  Again, the hip was very stable.  Radiographically, leg length was  Slightly shorter due to the lower  neck cut that was required due to the tumor resection.  I elected to upsize to a +5 head.  I dislocated the hip the trunnion was cleaned and a +5 head was placed and impacted in place.  The hip was then reduced again.  To get range of motion was stable.  Ligament was equal at this point..  We then copiously irrigated the wound bed. We then repaired  the capsule and subsequently repaired the fascia of the tensor fascia  grady.  We then closed the wound in a layered fashion using 2-0 Biosyn  subcuticular sutures and running 4-0 subcuticular sutures were used.   Dermabond was then applied.  A  sterile dressing was applied.  The  patient was then awakened from sedation.  Prior to leaving the  operating room, I checked their clinical leg length and it was equal.   The patient was then transferred to PACU in stable condition without any  complication.    Dr. Alcala was present and scrubbed for all critical portions of the case.     PLAN:    The patient will be readmitted to the oncologic service for post-operative management. They will be evaluated by physical therapy for disposition planning.   Patient will be weightbearing as tolerated.  No hip precaution  required.  Patient received postoperative radiation therapy to the bone if indicated in 4 weeks once the wound is completely healed.  They will receive ancef for 24 hours post-op When patient is discharged, they will return to clinic in 2  weeks.  We will obtain x-ray of the AP pelvis, left AP radiograph,  and cross-table lateral.     Complications:  None; patient tolerated the procedure well.    Disposition: PACU - hemodynamically stable.  Condition: stable         Additional Details:       Attending Attestation: I was present and scrubbed for the entire procedure.    Jimmy Alcala  Phone Number: [phone]

## 2024-10-14 ENCOUNTER — APPOINTMENT (OUTPATIENT)
Dept: CARDIOLOGY | Facility: CLINIC | Age: 74
End: 2024-10-14
Payer: MEDICARE

## 2024-10-14 LAB
LABORATORY COMMENT REPORT: NORMAL
PATH REPORT.FINAL DX SPEC: NORMAL
PATH REPORT.GROSS SPEC: NORMAL
PATH REPORT.RELEVANT HX SPEC: NORMAL
PATH REPORT.TOTAL CANCER: NORMAL

## 2024-10-22 ENCOUNTER — APPOINTMENT (OUTPATIENT)
Dept: ORTHOPEDIC SURGERY | Facility: HOSPITAL | Age: 74
End: 2024-10-22
Payer: MEDICARE

## 2024-11-05 ENCOUNTER — APPOINTMENT (OUTPATIENT)
Dept: ORTHOPEDIC SURGERY | Facility: HOSPITAL | Age: 74
End: 2024-11-05
Payer: MEDICARE

## 2024-11-05 ENCOUNTER — APPOINTMENT (OUTPATIENT)
Dept: UROLOGY | Facility: CLINIC | Age: 74
End: 2024-11-05
Payer: MEDICARE

## (undated) DEVICE — APPLICATOR, CHLORAPREP, W/ORANGE TINT, 26ML

## (undated) DEVICE — MAYO TRAY, LARGE

## (undated) DEVICE — HOOD, STERISHIELD T4 SYSTEM

## (undated) DEVICE — TUBING, SUCTION, CONNECTING, STERILE 0.25 X 120 IN., LF

## (undated) DEVICE — TOWEL, SURGICAL, NEURO, O/R, 16 X 26, BLUE, STERILE

## (undated) DEVICE — Device

## (undated) DEVICE — STAPLER, SKIN PROXIMATE, 35 WIDE

## (undated) DEVICE — BANDAGE, GAUZE, CONFORMING, KERLIX, 6 PLY, 4.5 IN X 4.1 YD

## (undated) DEVICE — SUTURE, MONOCRYL, 2-0, 36 IN, CT-1, UNDYED

## (undated) DEVICE — BLADE, SAW, SAGITTAL, 18.5 X 73 X 0.76 MM, STAINLESS STEEL, STERILE

## (undated) DEVICE — SUTURE, ETHIBOND, 5, 30 IN, V40, MULTIPACK, GREEN

## (undated) DEVICE — PAD, GROUNDING, ELECTROSURGICAL, W/9 FT CABLE, POLYHESIVE II, ADULT, LF

## (undated) DEVICE — BLADE, SAW, RECIPROCATING, DOUBLE-SIDED, 70 X 12.5 X 1 MM, STAINLESS STEEL

## (undated) DEVICE — CONTAINER, SPECIMEN, 120 ML, STERILE

## (undated) DEVICE — ELECTRODE, ELECTROSURGICAL, BLADE, EXTENDED, 6.5 IN, STAINLESS STEEL

## (undated) DEVICE — DRAPE, INCISE, ANTIMICROBIAL, IOBAN 2, STERI DRAPE, 23 X 33 IN, DISPOSABLE, STERILE

## (undated) DEVICE — ELECTRODE, ELECTROSURGICAL, BLADE, INSULATED, ENT/IMA, STERILE

## (undated) DEVICE — SYRINGE, 35 CC, LUER LOCK, MONOJECT, W/O CAP, LF

## (undated) DEVICE — DRAPE, SHEET, U, W/ADHESIVE STRIP, IMPERVIOUS, 60 X 70 IN, DISPOSABLE, LF, STERILE

## (undated) DEVICE — COVER, PLASTIC, MAYO STAND, 29.5IN X 55.5IN

## (undated) DEVICE — DRAPE, SHEET, THREE QUARTER, FAN FOLD, 57 X 77 IN

## (undated) DEVICE — CLOSURE SYSTEM, DERMABOND, PRINEO, 22CM, STERILE

## (undated) DEVICE — DRESSING, MEPILEX BORDER, POST-OP AG, 4 X 10 IN

## (undated) DEVICE — INTERPULSE HANDPIECE SET W/ 10FT SUCTION TUBING

## (undated) DEVICE — SUTURE, PDS II, 0, 27 IN, CT-2, VIOLET

## (undated) DEVICE — HIGH FLOW TIP FOR INTERPULSE HANDPIECE SET

## (undated) DEVICE — NEEDLE, EPIDURAL, TUOHY, 18 G X 3.5 IN

## (undated) DEVICE — MANIFOLD, 4 PORT NEPTUNE STANDARD

## (undated) DEVICE — COVER, CART, 45 X 27 X 48 IN, CLEAR

## (undated) DEVICE — SPONGE, LAP, XRAY DECT, 18IN X 18IN, W/LOOP, STERILE

## (undated) DEVICE — APPLICATION KIT, ADVANCED CEMENT MIXING/BIO-PREP CEMENT